# Patient Record
Sex: MALE | Race: WHITE | Employment: UNEMPLOYED | URBAN - METROPOLITAN AREA
[De-identification: names, ages, dates, MRNs, and addresses within clinical notes are randomized per-mention and may not be internally consistent; named-entity substitution may affect disease eponyms.]

---

## 2018-02-01 PROBLEM — I15.9 SECONDARY HYPERTENSION: Status: ACTIVE | Noted: 2018-02-01

## 2018-02-01 PROBLEM — E11.9 TYPE 2 DIABETES MELLITUS WITHOUT COMPLICATION (HCC): Status: ACTIVE | Noted: 2018-02-01

## 2018-04-02 ENCOUNTER — OFFICE VISIT (OUTPATIENT)
Dept: PRIMARY CARE CLINIC | Age: 53
End: 2018-04-02
Payer: COMMERCIAL

## 2018-04-02 VITALS
HEART RATE: 96 BPM | TEMPERATURE: 98.2 F | BODY MASS INDEX: 56.42 KG/M2 | WEIGHT: 315 LBS | SYSTOLIC BLOOD PRESSURE: 139 MMHG | DIASTOLIC BLOOD PRESSURE: 80 MMHG | OXYGEN SATURATION: 98 %

## 2018-04-02 DIAGNOSIS — I15.9 SECONDARY HYPERTENSION: ICD-10-CM

## 2018-04-02 DIAGNOSIS — Z23 NEED FOR TETANUS BOOSTER: ICD-10-CM

## 2018-04-02 DIAGNOSIS — E11.9 TYPE 2 DIABETES MELLITUS WITHOUT COMPLICATION, WITHOUT LONG-TERM CURRENT USE OF INSULIN (HCC): Primary | ICD-10-CM

## 2018-04-02 PROCEDURE — 3046F HEMOGLOBIN A1C LEVEL >9.0%: CPT | Performed by: INTERNAL MEDICINE

## 2018-04-02 PROCEDURE — 1036F TOBACCO NON-USER: CPT | Performed by: INTERNAL MEDICINE

## 2018-04-02 PROCEDURE — 3017F COLORECTAL CA SCREEN DOC REV: CPT | Performed by: INTERNAL MEDICINE

## 2018-04-02 PROCEDURE — G8417 CALC BMI ABV UP PARAM F/U: HCPCS | Performed by: INTERNAL MEDICINE

## 2018-04-02 PROCEDURE — 90471 IMMUNIZATION ADMIN: CPT | Performed by: INTERNAL MEDICINE

## 2018-04-02 PROCEDURE — 90715 TDAP VACCINE 7 YRS/> IM: CPT | Performed by: INTERNAL MEDICINE

## 2018-04-02 PROCEDURE — G8427 DOCREV CUR MEDS BY ELIG CLIN: HCPCS | Performed by: INTERNAL MEDICINE

## 2018-04-02 PROCEDURE — 99214 OFFICE O/P EST MOD 30 MIN: CPT | Performed by: INTERNAL MEDICINE

## 2018-04-02 RX ORDER — PIOGLITAZONEHYDROCHLORIDE 30 MG/1
15 TABLET ORAL DAILY
Qty: 45 TABLET | Refills: 1 | Status: SHIPPED | OUTPATIENT
Start: 2018-04-02 | End: 2018-07-02 | Stop reason: SDUPTHER

## 2018-04-02 RX ORDER — LISINOPRIL 40 MG/1
20 TABLET ORAL 2 TIMES DAILY
Qty: 90 TABLET | Refills: 1 | Status: SHIPPED | OUTPATIENT
Start: 2018-04-02 | End: 2018-07-02 | Stop reason: SDUPTHER

## 2018-04-09 ENCOUNTER — HOSPITAL ENCOUNTER (OUTPATIENT)
Age: 53
Discharge: HOME | End: 2018-04-11
Payer: COMMERCIAL

## 2018-04-09 ENCOUNTER — NURSE ONLY (OUTPATIENT)
Dept: PRIMARY CARE CLINIC | Age: 53
End: 2018-04-09

## 2018-04-09 DIAGNOSIS — I15.9 SECONDARY HYPERTENSION: ICD-10-CM

## 2018-04-09 DIAGNOSIS — E78.2 MIXED HYPERLIPIDEMIA: Primary | ICD-10-CM

## 2018-04-09 DIAGNOSIS — E11.9 TYPE 2 DIABETES MELLITUS WITHOUT COMPLICATION, WITHOUT LONG-TERM CURRENT USE OF INSULIN (HCC): ICD-10-CM

## 2018-04-09 LAB
ANION GAP SERPL CALCULATED.3IONS-SCNC: 18 MMOL/L (ref 7–16)
BUN BLDV-MCNC: 17 MG/DL (ref 6–20)
CALCIUM SERPL-MCNC: 9.1 MG/DL (ref 8.6–10.2)
CHLORIDE BLD-SCNC: 99 MMOL/L (ref 98–107)
CHOLESTEROL, TOTAL: 172 MG/DL (ref 0–199)
CO2: 20 MMOL/L (ref 22–29)
CREAT SERPL-MCNC: 0.9 MG/DL (ref 0.7–1.2)
GFR AFRICAN AMERICAN: >60
GFR NON-AFRICAN AMERICAN: >60 ML/MIN/1.73
GLUCOSE BLD-MCNC: 134 MG/DL (ref 74–109)
HBA1C MFR BLD: 6.3 % (ref 4.8–5.9)
HCT VFR BLD CALC: 42 % (ref 37–54)
HDLC SERPL-MCNC: 31 MG/DL
HEMOGLOBIN: 13.3 G/DL (ref 12.5–16.5)
LDL CHOLESTEROL CALCULATED: 112 MG/DL (ref 0–99)
MCH RBC QN AUTO: 28 PG (ref 26–35)
MCHC RBC AUTO-ENTMCNC: 31.7 % (ref 32–34.5)
MCV RBC AUTO: 88.4 FL (ref 80–99.9)
PDW BLD-RTO: 14.7 FL (ref 11.5–15)
PLATELET # BLD: 253 E9/L (ref 130–450)
PMV BLD AUTO: 10.1 FL (ref 7–12)
POTASSIUM SERPL-SCNC: 4.8 MMOL/L (ref 3.5–5)
RBC # BLD: 4.75 E12/L (ref 3.8–5.8)
SODIUM BLD-SCNC: 137 MMOL/L (ref 132–146)
TRIGL SERPL-MCNC: 145 MG/DL (ref 0–149)
VLDLC SERPL CALC-MCNC: 29 MG/DL
WBC # BLD: 8.4 E9/L (ref 4.5–11.5)

## 2018-04-09 PROCEDURE — 83036 HEMOGLOBIN GLYCOSYLATED A1C: CPT

## 2018-04-09 PROCEDURE — 85027 COMPLETE CBC AUTOMATED: CPT

## 2018-04-09 PROCEDURE — 80048 BASIC METABOLIC PNL TOTAL CA: CPT

## 2018-04-09 PROCEDURE — 80061 LIPID PANEL: CPT

## 2018-05-21 ENCOUNTER — OFFICE VISIT (OUTPATIENT)
Dept: FAMILY MEDICINE CLINIC | Age: 53
End: 2018-05-21
Payer: COMMERCIAL

## 2018-05-21 VITALS
HEART RATE: 92 BPM | OXYGEN SATURATION: 98 % | BODY MASS INDEX: 42.66 KG/M2 | DIASTOLIC BLOOD PRESSURE: 75 MMHG | RESPIRATION RATE: 19 BRPM | TEMPERATURE: 98.5 F | SYSTOLIC BLOOD PRESSURE: 118 MMHG | HEIGHT: 72 IN | WEIGHT: 315 LBS

## 2018-05-21 DIAGNOSIS — B37.2 INTERTRIGINOUS CANDIDIASIS: Primary | ICD-10-CM

## 2018-05-21 PROCEDURE — G8427 DOCREV CUR MEDS BY ELIG CLIN: HCPCS | Performed by: PHYSICIAN ASSISTANT

## 2018-05-21 PROCEDURE — 3017F COLORECTAL CA SCREEN DOC REV: CPT | Performed by: PHYSICIAN ASSISTANT

## 2018-05-21 PROCEDURE — G8417 CALC BMI ABV UP PARAM F/U: HCPCS | Performed by: PHYSICIAN ASSISTANT

## 2018-05-21 PROCEDURE — 1036F TOBACCO NON-USER: CPT | Performed by: PHYSICIAN ASSISTANT

## 2018-05-21 PROCEDURE — 99213 OFFICE O/P EST LOW 20 MIN: CPT | Performed by: PHYSICIAN ASSISTANT

## 2018-05-21 RX ORDER — KETOCONAZOLE 20 MG/G
CREAM TOPICAL
Qty: 30 G | Refills: 1 | Status: SHIPPED | OUTPATIENT
Start: 2018-05-21 | End: 2019-01-17

## 2018-05-21 RX ORDER — FLUCONAZOLE 150 MG/1
150 TABLET ORAL ONCE
Qty: 1 TABLET | Refills: 0 | Status: SHIPPED | OUTPATIENT
Start: 2018-05-21 | End: 2018-05-21

## 2018-07-02 ENCOUNTER — OFFICE VISIT (OUTPATIENT)
Dept: PRIMARY CARE CLINIC | Age: 53
End: 2018-07-02
Payer: COMMERCIAL

## 2018-07-02 VITALS
DIASTOLIC BLOOD PRESSURE: 88 MMHG | WEIGHT: 315 LBS | SYSTOLIC BLOOD PRESSURE: 138 MMHG | OXYGEN SATURATION: 98 % | HEART RATE: 86 BPM | BODY MASS INDEX: 56.42 KG/M2 | TEMPERATURE: 98.2 F

## 2018-07-02 DIAGNOSIS — E11.9 TYPE 2 DIABETES MELLITUS WITHOUT COMPLICATION, WITHOUT LONG-TERM CURRENT USE OF INSULIN (HCC): Primary | ICD-10-CM

## 2018-07-02 DIAGNOSIS — I15.9 SECONDARY HYPERTENSION: ICD-10-CM

## 2018-07-02 LAB — HBA1C MFR BLD: 6.8 %

## 2018-07-02 PROCEDURE — 83036 HEMOGLOBIN GLYCOSYLATED A1C: CPT | Performed by: INTERNAL MEDICINE

## 2018-07-02 PROCEDURE — 99213 OFFICE O/P EST LOW 20 MIN: CPT | Performed by: INTERNAL MEDICINE

## 2018-07-02 PROCEDURE — 1036F TOBACCO NON-USER: CPT | Performed by: INTERNAL MEDICINE

## 2018-07-02 PROCEDURE — G8417 CALC BMI ABV UP PARAM F/U: HCPCS | Performed by: INTERNAL MEDICINE

## 2018-07-02 PROCEDURE — 3044F HG A1C LEVEL LT 7.0%: CPT | Performed by: INTERNAL MEDICINE

## 2018-07-02 PROCEDURE — 3017F COLORECTAL CA SCREEN DOC REV: CPT | Performed by: INTERNAL MEDICINE

## 2018-07-02 PROCEDURE — G8427 DOCREV CUR MEDS BY ELIG CLIN: HCPCS | Performed by: INTERNAL MEDICINE

## 2018-07-02 PROCEDURE — 2022F DILAT RTA XM EVC RTNOPTHY: CPT | Performed by: INTERNAL MEDICINE

## 2018-07-02 RX ORDER — PIOGLITAZONEHYDROCHLORIDE 30 MG/1
15 TABLET ORAL DAILY
Qty: 45 TABLET | Refills: 1 | Status: SHIPPED | OUTPATIENT
Start: 2018-07-02 | End: 2018-11-01 | Stop reason: SDUPTHER

## 2018-07-02 RX ORDER — LISINOPRIL 40 MG/1
20 TABLET ORAL 2 TIMES DAILY
Qty: 90 TABLET | Refills: 1 | Status: SHIPPED | OUTPATIENT
Start: 2018-07-02 | End: 2018-11-01 | Stop reason: SDUPTHER

## 2018-11-01 ENCOUNTER — HOSPITAL ENCOUNTER (OUTPATIENT)
Age: 53
Discharge: HOME | End: 2018-11-03
Payer: COMMERCIAL

## 2018-11-01 ENCOUNTER — OFFICE VISIT (OUTPATIENT)
Dept: PRIMARY CARE CLINIC | Age: 53
End: 2018-11-01
Payer: COMMERCIAL

## 2018-11-01 VITALS
SYSTOLIC BLOOD PRESSURE: 122 MMHG | TEMPERATURE: 98.2 F | BODY MASS INDEX: 55.47 KG/M2 | WEIGHT: 315 LBS | DIASTOLIC BLOOD PRESSURE: 78 MMHG | OXYGEN SATURATION: 98 % | HEART RATE: 72 BPM

## 2018-11-01 DIAGNOSIS — I15.9 SECONDARY HYPERTENSION: ICD-10-CM

## 2018-11-01 DIAGNOSIS — E11.9 TYPE 2 DIABETES MELLITUS WITHOUT COMPLICATION, WITHOUT LONG-TERM CURRENT USE OF INSULIN (HCC): ICD-10-CM

## 2018-11-01 DIAGNOSIS — E66.01 CLASS 3 SEVERE OBESITY WITH SERIOUS COMORBIDITY AND BODY MASS INDEX (BMI) OF 50.0 TO 59.9 IN ADULT, UNSPECIFIED OBESITY TYPE (HCC): ICD-10-CM

## 2018-11-01 DIAGNOSIS — Z23 NEED FOR INFLUENZA VACCINATION: Primary | ICD-10-CM

## 2018-11-01 LAB
ALBUMIN SERPL-MCNC: 4.1 G/DL (ref 3.5–5.2)
ALP BLD-CCNC: 75 U/L (ref 40–129)
ALT SERPL-CCNC: 20 U/L (ref 0–40)
ANION GAP SERPL CALCULATED.3IONS-SCNC: 14 MMOL/L (ref 7–16)
AST SERPL-CCNC: 20 U/L (ref 0–39)
BILIRUB SERPL-MCNC: 0.3 MG/DL (ref 0–1.2)
BUN BLDV-MCNC: 13 MG/DL (ref 6–20)
CALCIUM SERPL-MCNC: 9.3 MG/DL (ref 8.6–10.2)
CHLORIDE BLD-SCNC: 97 MMOL/L (ref 98–107)
CO2: 23 MMOL/L (ref 22–29)
CREAT SERPL-MCNC: 0.9 MG/DL (ref 0.7–1.2)
GFR AFRICAN AMERICAN: >60
GFR NON-AFRICAN AMERICAN: >60 ML/MIN/1.73
GLUCOSE BLD-MCNC: 105 MG/DL (ref 74–109)
HBA1C MFR BLD: 6.4 % (ref 4–5.6)
HCT VFR BLD CALC: 44.1 % (ref 37–54)
HEMOGLOBIN: 13.9 G/DL (ref 12.5–16.5)
MCH RBC QN AUTO: 27.8 PG (ref 26–35)
MCHC RBC AUTO-ENTMCNC: 31.5 % (ref 32–34.5)
MCV RBC AUTO: 88.2 FL (ref 80–99.9)
PDW BLD-RTO: 14.8 FL (ref 11.5–15)
PLATELET # BLD: 277 E9/L (ref 130–450)
PMV BLD AUTO: 9.8 FL (ref 7–12)
POTASSIUM SERPL-SCNC: 4.8 MMOL/L (ref 3.5–5)
RBC # BLD: 5 E12/L (ref 3.8–5.8)
SODIUM BLD-SCNC: 134 MMOL/L (ref 132–146)
TOTAL PROTEIN: 7.3 G/DL (ref 6.4–8.3)
WBC # BLD: 7.9 E9/L (ref 4.5–11.5)

## 2018-11-01 PROCEDURE — 90688 IIV4 VACCINE SPLT 0.5 ML IM: CPT | Performed by: INTERNAL MEDICINE

## 2018-11-01 PROCEDURE — 2022F DILAT RTA XM EVC RTNOPTHY: CPT | Performed by: INTERNAL MEDICINE

## 2018-11-01 PROCEDURE — 3044F HG A1C LEVEL LT 7.0%: CPT | Performed by: INTERNAL MEDICINE

## 2018-11-01 PROCEDURE — 83036 HEMOGLOBIN GLYCOSYLATED A1C: CPT

## 2018-11-01 PROCEDURE — 3017F COLORECTAL CA SCREEN DOC REV: CPT | Performed by: INTERNAL MEDICINE

## 2018-11-01 PROCEDURE — 90471 IMMUNIZATION ADMIN: CPT | Performed by: INTERNAL MEDICINE

## 2018-11-01 PROCEDURE — 99214 OFFICE O/P EST MOD 30 MIN: CPT | Performed by: INTERNAL MEDICINE

## 2018-11-01 PROCEDURE — 85027 COMPLETE CBC AUTOMATED: CPT

## 2018-11-01 PROCEDURE — 1036F TOBACCO NON-USER: CPT | Performed by: INTERNAL MEDICINE

## 2018-11-01 PROCEDURE — 80053 COMPREHEN METABOLIC PANEL: CPT

## 2018-11-01 PROCEDURE — G8427 DOCREV CUR MEDS BY ELIG CLIN: HCPCS | Performed by: INTERNAL MEDICINE

## 2018-11-01 PROCEDURE — G8417 CALC BMI ABV UP PARAM F/U: HCPCS | Performed by: INTERNAL MEDICINE

## 2018-11-01 PROCEDURE — G8482 FLU IMMUNIZE ORDER/ADMIN: HCPCS | Performed by: INTERNAL MEDICINE

## 2018-11-01 RX ORDER — LISINOPRIL 40 MG/1
20 TABLET ORAL 2 TIMES DAILY
Qty: 90 TABLET | Refills: 1 | Status: CANCELLED | OUTPATIENT
Start: 2018-11-01

## 2018-11-01 RX ORDER — PIOGLITAZONEHYDROCHLORIDE 30 MG/1
15 TABLET ORAL DAILY
Qty: 45 TABLET | Refills: 1 | Status: SHIPPED | OUTPATIENT
Start: 2018-11-01 | End: 2019-03-01 | Stop reason: SDUPTHER

## 2018-11-01 RX ORDER — LISINOPRIL 40 MG/1
20 TABLET ORAL 2 TIMES DAILY
Qty: 90 TABLET | Refills: 1 | Status: SHIPPED | OUTPATIENT
Start: 2018-11-01 | End: 2019-03-01 | Stop reason: SDUPTHER

## 2018-11-01 RX ORDER — PIOGLITAZONEHYDROCHLORIDE 30 MG/1
15 TABLET ORAL DAILY
Qty: 45 TABLET | Refills: 1 | Status: CANCELLED | OUTPATIENT
Start: 2018-11-01

## 2018-11-01 NOTE — PROGRESS NOTES
Vaccine Information Sheet, \"Influenza - Inactivated\"  given to Rashawn Mcbride, or parent/legal guardian of  Rashawn Mcbride and verbalized understanding. Patient responses:    Have you ever had a reaction to a flu vaccine? No  Are you able to eat eggs without adverse effects? Yes  Do you have any current illness? No  Have you ever had Guillian [place] Syndrome? No    Flu vaccine given per order. Please see immunization tab.
vomiting. Musculoskeletal: Negative for arthralgias, back pain, gait problem and joint swelling. Neurological: Negative for dizziness, weakness,numbness and headaches. OBJECTIVE:  /78   Pulse 72   Temp 98.2 °F (36.8 °C)   Wt (!) 409 lb (185.5 kg)   SpO2 98%   BMI 55.47 kg/m²      Physical Exam   Constitutional:  obese  HENT: No sinustenderness or lymphadenopathy  Head: Normocephalic and atraumatic. Eyes: Left eye exhibits no discharge. No scleral icterus. Neck: No tracheal deviation present. No thyromegalypresent. Cardiovascular: Normal rate, regular rhythm, normal heart sounds and intact distal pulses. Exam reveals no gallop and no friction rub. No murmur heard. Pulmonary/Chest: Effort normal and breath sounds normal. No respiratory distress. She has no wheezes. no rales. no tenderness. Musculoskeletal:  no tenderness. Neurological:alert and oriented to person, place, and time. Skin: No diaphoresis. Psychiatric: Normal mood and affect. Behavior isnormal.     ASSESSMENT AND PLAN:    Nnia Marrero was seen today for diabetes and congestion. Diagnoses and all orders for this visit:    Need for influenza vaccination  -     INFLUENZA, QUADV, 6 MO AND OLDER, IM, MDV, 0.5ML (FLULAVAL QUADV)    Secondary hypertension  -     lisinopril (PRINIVIL;ZESTRIL) 40 MG tablet; Take 0.5 tablets by mouth 2 times daily    Type 2 diabetes mellitus without complication, without long-term current use of insulin (HCC)  -     pioglitazone (ACTOS) 30 MG tablet; Take 0.5 tablets by mouth daily  -     CBC; Future  -     Comprehensive Metabolic Panel; Future  -     Hemoglobin A1C; Future  -     [facility]- Diabetes Education- Zena    Class 3 severe obesity with serious comorbidity and body mass index (BMI) of 50.0 to 59.9 in adult, unspecified obesity type ([facility] [address]    Other orders  -     Cancel: lisinopril (PRINIVIL;ZESTRIL) 40 MG tablet;  Take 0.5 tablets by mouth 2 times daily  -     Cancel: pioglitazone (ACTOS)

## 2018-11-09 DIAGNOSIS — E11.9 TYPE 2 DIABETES MELLITUS WITHOUT COMPLICATION, WITHOUT LONG-TERM CURRENT USE OF INSULIN (HCC): Primary | ICD-10-CM

## 2018-11-29 ENCOUNTER — HOSPITAL ENCOUNTER (OUTPATIENT)
Dept: DIABETES SERVICES | Age: 53
Setting detail: THERAPIES SERIES
Discharge: HOME | End: 2018-11-29
Payer: COMMERCIAL

## 2018-11-29 VITALS — WEIGHT: 315 LBS | HEIGHT: 72 IN | BODY MASS INDEX: 42.66 KG/M2

## 2018-11-29 PROCEDURE — 97802 MEDICAL NUTRITION INDIV IN: CPT | Performed by: DIETITIAN, REGISTERED

## 2018-11-29 ASSESSMENT — PATIENT HEALTH QUESTIONNAIRE - PHQ9
SUM OF ALL RESPONSES TO PHQ QUESTIONS 1-9: 0
SUM OF ALL RESPONSES TO PHQ QUESTIONS 1-9: 0

## 2018-11-29 NOTE — PROGRESS NOTES
Diabetes Self-Management Education Record    Participant Name: Renae Salgado  Referring Provider: Radha Loera DO  Assessment/Evaluation Ratings:  1=Needs Instruction   4=Demonstrates Understanding/Competency  2=Needs Review   NC=Not Covered    3=Comprehends Key Points  N/A=Not Applicable  Topics/Learning Objectives Pre-session Assess Date:  11/29/18-JA Instr. Date Reinforce Date Post- session Eval Comments   Diabetes disease process & Treatment process: Define diabetes & pre-diabetes; Identify own type of diabetes; role of the pancreas; signs/symptoms; diagnostic criteria; prevention & treatment options; contributing factors. 1 11/29/18-JA  3 Type 2, Pt is not sure when diagnosed, approximately 5 years ago. Provided type 1 and 2 booklet   Incorporating nutritional management into lifestyle: Describe effect of type, amount & timing of food on blood glucose; Describe basic meal planning techniques & current nutrition guidelines;Correctly read food labels & demonstrate CHO counting & portion control with personalized meal plan. Identify dining out strategies, & dietary sick day guidelines. 1 11/29/18-JA  3 11/29/18 Instructed on 2700 calorie meal plan, 3 meals and 3 snacks. 5 carbohydrate choices at each meal and 1 - 2 in between each meal. 15 oz protein, 8 fats daily. Provided sample meals and snacks. Pt states meal are irregular d/t work schedule. Pt eats dinner earlier than he wants to please his mom. Then he eats again later that night. Admits he overeats at night. Discussed foods to eat such as vegetables and low fat protein, instead of carbohydrate choices at night. Pt feels he will be able to change his food choices. Incorporating physical activity into lifestyle:   Verbalize effect of exercise on blood glucose levels; benefits of regular exercise; safety considerations; contraindications; maintenance of activity.  1 11/29/18-JA  3 Discussed effects of exercise on blood sugar   Using medications

## 2019-01-17 ENCOUNTER — OFFICE VISIT (OUTPATIENT)
Dept: PRIMARY CARE CLINIC | Age: 54
End: 2019-01-17
Payer: COMMERCIAL

## 2019-01-17 VITALS
OXYGEN SATURATION: 98 % | HEIGHT: 73 IN | HEART RATE: 100 BPM | TEMPERATURE: 97.8 F | BODY MASS INDEX: 41.75 KG/M2 | WEIGHT: 315 LBS | DIASTOLIC BLOOD PRESSURE: 82 MMHG | SYSTOLIC BLOOD PRESSURE: 122 MMHG

## 2019-01-17 DIAGNOSIS — J40 BRONCHITIS: Primary | ICD-10-CM

## 2019-01-17 PROCEDURE — 1036F TOBACCO NON-USER: CPT | Performed by: INTERNAL MEDICINE

## 2019-01-17 PROCEDURE — 3017F COLORECTAL CA SCREEN DOC REV: CPT | Performed by: INTERNAL MEDICINE

## 2019-01-17 PROCEDURE — G8417 CALC BMI ABV UP PARAM F/U: HCPCS | Performed by: INTERNAL MEDICINE

## 2019-01-17 PROCEDURE — 99213 OFFICE O/P EST LOW 20 MIN: CPT | Performed by: INTERNAL MEDICINE

## 2019-01-17 PROCEDURE — G8482 FLU IMMUNIZE ORDER/ADMIN: HCPCS | Performed by: INTERNAL MEDICINE

## 2019-01-17 PROCEDURE — G8427 DOCREV CUR MEDS BY ELIG CLIN: HCPCS | Performed by: INTERNAL MEDICINE

## 2019-01-17 RX ORDER — PREDNISONE 10 MG/1
10 TABLET ORAL DAILY
Qty: 3 TABLET | Refills: 0 | Status: SHIPPED | OUTPATIENT
Start: 2019-01-17 | End: 2019-01-20

## 2019-01-17 RX ORDER — DOXYCYCLINE HYCLATE 100 MG/1
100 CAPSULE ORAL 2 TIMES DAILY
Qty: 20 CAPSULE | Refills: 0 | Status: SHIPPED | OUTPATIENT
Start: 2019-01-17 | End: 2019-01-27

## 2019-01-17 RX ORDER — PREDNISONE 1 MG/1
5 TABLET ORAL DAILY
Qty: 3 TABLET | Refills: 0 | Status: SHIPPED | OUTPATIENT
Start: 2019-01-17 | End: 2019-01-20

## 2019-01-17 RX ORDER — PREDNISONE 20 MG/1
20 TABLET ORAL DAILY
Qty: 3 TABLET | Refills: 0 | Status: SHIPPED | OUTPATIENT
Start: 2019-01-17 | End: 2019-01-20

## 2019-03-01 ENCOUNTER — OFFICE VISIT (OUTPATIENT)
Dept: PRIMARY CARE CLINIC | Age: 54
End: 2019-03-01
Payer: COMMERCIAL

## 2019-03-01 VITALS
HEIGHT: 73 IN | DIASTOLIC BLOOD PRESSURE: 84 MMHG | SYSTOLIC BLOOD PRESSURE: 136 MMHG | WEIGHT: 315 LBS | BODY MASS INDEX: 41.75 KG/M2 | TEMPERATURE: 98 F | HEART RATE: 72 BPM | OXYGEN SATURATION: 96 %

## 2019-03-01 DIAGNOSIS — N47.5 FORESKIN ADHESIONS: ICD-10-CM

## 2019-03-01 DIAGNOSIS — J32.1 CHRONIC FRONTAL SINUSITIS: Primary | ICD-10-CM

## 2019-03-01 DIAGNOSIS — S39.012A STRAIN OF LUMBAR REGION, INITIAL ENCOUNTER: ICD-10-CM

## 2019-03-01 DIAGNOSIS — I15.9 SECONDARY HYPERTENSION: ICD-10-CM

## 2019-03-01 DIAGNOSIS — E11.9 TYPE 2 DIABETES MELLITUS WITHOUT COMPLICATION, WITHOUT LONG-TERM CURRENT USE OF INSULIN (HCC): ICD-10-CM

## 2019-03-01 PROCEDURE — G8417 CALC BMI ABV UP PARAM F/U: HCPCS | Performed by: INTERNAL MEDICINE

## 2019-03-01 PROCEDURE — 1036F TOBACCO NON-USER: CPT | Performed by: INTERNAL MEDICINE

## 2019-03-01 PROCEDURE — 2022F DILAT RTA XM EVC RTNOPTHY: CPT | Performed by: INTERNAL MEDICINE

## 2019-03-01 PROCEDURE — G8427 DOCREV CUR MEDS BY ELIG CLIN: HCPCS | Performed by: INTERNAL MEDICINE

## 2019-03-01 PROCEDURE — 3017F COLORECTAL CA SCREEN DOC REV: CPT | Performed by: INTERNAL MEDICINE

## 2019-03-01 PROCEDURE — 3046F HEMOGLOBIN A1C LEVEL >9.0%: CPT | Performed by: INTERNAL MEDICINE

## 2019-03-01 PROCEDURE — G8482 FLU IMMUNIZE ORDER/ADMIN: HCPCS | Performed by: INTERNAL MEDICINE

## 2019-03-01 PROCEDURE — 99214 OFFICE O/P EST MOD 30 MIN: CPT | Performed by: INTERNAL MEDICINE

## 2019-03-01 RX ORDER — LISINOPRIL 40 MG/1
20 TABLET ORAL 2 TIMES DAILY
Qty: 90 TABLET | Refills: 1 | Status: SHIPPED | OUTPATIENT
Start: 2019-03-01 | End: 2019-06-26 | Stop reason: SDUPTHER

## 2019-03-01 RX ORDER — PIOGLITAZONEHYDROCHLORIDE 30 MG/1
15 TABLET ORAL DAILY
Qty: 45 TABLET | Refills: 1 | Status: SHIPPED | OUTPATIENT
Start: 2019-03-01 | End: 2019-11-04 | Stop reason: SDUPTHER

## 2019-03-01 ASSESSMENT — PATIENT HEALTH QUESTIONNAIRE - PHQ9
2. FEELING DOWN, DEPRESSED OR HOPELESS: 0
SUM OF ALL RESPONSES TO PHQ QUESTIONS 1-9: 0
SUM OF ALL RESPONSES TO PHQ QUESTIONS 1-9: 0
SUM OF ALL RESPONSES TO PHQ9 QUESTIONS 1 & 2: 0
1. LITTLE INTEREST OR PLEASURE IN DOING THINGS: 0

## 2019-03-01 ASSESSMENT — ENCOUNTER SYMPTOMS
SHORTNESS OF BREATH: 1
RHINORRHEA: 1
SINUS PAIN: 1
WHEEZING: 1
SINUS PRESSURE: 1
BACK PAIN: 1

## 2019-03-13 ENCOUNTER — OFFICE VISIT (OUTPATIENT)
Dept: PRIMARY CARE CLINIC | Age: 54
End: 2019-03-13
Payer: COMMERCIAL

## 2019-03-13 VITALS
SYSTOLIC BLOOD PRESSURE: 118 MMHG | WEIGHT: 315 LBS | DIASTOLIC BLOOD PRESSURE: 78 MMHG | TEMPERATURE: 98.2 F | BODY MASS INDEX: 55.28 KG/M2 | HEART RATE: 86 BPM | OXYGEN SATURATION: 98 %

## 2019-03-13 DIAGNOSIS — S39.012A STRAIN OF LUMBAR REGION, INITIAL ENCOUNTER: Primary | ICD-10-CM

## 2019-03-13 PROCEDURE — 99213 OFFICE O/P EST LOW 20 MIN: CPT | Performed by: INTERNAL MEDICINE

## 2019-03-13 PROCEDURE — 1036F TOBACCO NON-USER: CPT | Performed by: INTERNAL MEDICINE

## 2019-03-13 PROCEDURE — G8417 CALC BMI ABV UP PARAM F/U: HCPCS | Performed by: INTERNAL MEDICINE

## 2019-03-13 PROCEDURE — 3017F COLORECTAL CA SCREEN DOC REV: CPT | Performed by: INTERNAL MEDICINE

## 2019-03-13 PROCEDURE — G8482 FLU IMMUNIZE ORDER/ADMIN: HCPCS | Performed by: INTERNAL MEDICINE

## 2019-03-13 PROCEDURE — G8427 DOCREV CUR MEDS BY ELIG CLIN: HCPCS | Performed by: INTERNAL MEDICINE

## 2019-03-13 RX ORDER — TIZANIDINE 2 MG/1
2 TABLET ORAL EVERY 6 HOURS PRN
Qty: 30 TABLET | Refills: 0 | Status: SHIPPED | OUTPATIENT
Start: 2019-03-13 | End: 2019-12-18 | Stop reason: ALTCHOICE

## 2019-04-16 ENCOUNTER — OFFICE VISIT (OUTPATIENT)
Dept: PRIMARY CARE CLINIC | Age: 54
End: 2019-04-16
Payer: COMMERCIAL

## 2019-04-16 VITALS
TEMPERATURE: 98 F | BODY MASS INDEX: 54.88 KG/M2 | SYSTOLIC BLOOD PRESSURE: 138 MMHG | HEART RATE: 76 BPM | DIASTOLIC BLOOD PRESSURE: 86 MMHG | WEIGHT: 315 LBS | OXYGEN SATURATION: 96 %

## 2019-04-16 DIAGNOSIS — R07.9 CHEST PAIN, UNSPECIFIED TYPE: Primary | ICD-10-CM

## 2019-04-16 DIAGNOSIS — R20.2 TINGLING OF LEFT UPPER EXTREMITY: ICD-10-CM

## 2019-04-16 PROCEDURE — 99213 OFFICE O/P EST LOW 20 MIN: CPT | Performed by: INTERNAL MEDICINE

## 2019-04-16 PROCEDURE — 93000 ELECTROCARDIOGRAM COMPLETE: CPT | Performed by: INTERNAL MEDICINE

## 2019-04-16 PROCEDURE — 1036F TOBACCO NON-USER: CPT | Performed by: INTERNAL MEDICINE

## 2019-04-16 PROCEDURE — 3017F COLORECTAL CA SCREEN DOC REV: CPT | Performed by: INTERNAL MEDICINE

## 2019-04-16 PROCEDURE — G8427 DOCREV CUR MEDS BY ELIG CLIN: HCPCS | Performed by: INTERNAL MEDICINE

## 2019-04-16 PROCEDURE — G8417 CALC BMI ABV UP PARAM F/U: HCPCS | Performed by: INTERNAL MEDICINE

## 2019-04-16 RX ORDER — DOXYCYCLINE HYCLATE 100 MG/1
100 CAPSULE ORAL 2 TIMES DAILY
COMMUNITY
End: 2019-12-27 | Stop reason: ALTCHOICE

## 2019-04-16 RX ORDER — PREDNISONE 1 MG/1
5 TABLET ORAL DAILY
COMMUNITY
End: 2019-04-19

## 2019-04-16 NOTE — PATIENT INSTRUCTIONS
Patient Education        Advance Directives: Care Instructions  Your Care Instructions  An advance directive is a legal way to state your wishes at the end of your life. It tells your family and your doctor what to do if you can no longer say what you want. There are two main types of advance directives. You can change them any time that your wishes change. · A living will tells your family and your doctor your wishes about life support and other treatment. · A durable power of  for health care lets you name a person to make treatment decisions for you when you can't speak for yourself. This person is called a health care agent. If you do not have an advance directive, decisions about your medical care may be made by a doctor or a  who doesn't know you. It may help to think of an advance directive as a gift to the people who care for you. If you have one, they won't have to make tough decisions by themselves. Follow-up care is a key part of your treatment and safety. Be sure to make and go to all appointments, and call your doctor if you are having problems. It's also a good idea to know your test results and keep a list of the medicines you take. How can you care for yourself at home? · Discuss your wishes with your loved ones and your doctor. This way, there are no surprises. · Many states have a unique form. Or you might use a universal form that has been approved by many states. This kind of form can sometimes be completed and stored online. Your electronic copy will then be available wherever you have a connection to the Internet. In most cases, doctors will respect your wishes even if you have a form from a different state. · You don't need a  to do an advance directive. But you may want to get legal advice. · Think about these questions when you prepare an advance directive:  ? Who do you want to make decisions about your medical care if you are not able to?  Many people choose a family member or close friend. ? Do you know enough about life support methods that might be used? If not, talk to your doctor so you understand. ? What are you most afraid of that might happen? You might be afraid of having pain, losing your independence, or being kept alive by machines. ? Where would you prefer to die? Choices include your home, a hospital, or a nursing home. ? Would you like to have information about hospice care to support you and your family? ? Do you want to donate organs when you die? ? Do you want certain Tenri[employer] practices performed before you die? If so, put your wishes in the advance directive. · Read your advance directive every year, and make changes as needed. When should you call for help? Be sure to contact your doctor if you have any questions. Where can you learn more? Go to [email or web address] and sign in to your [employer] account. Enter R264 in the Axial box to learn more about \"Advance Directives: Care Instructions. \"     If you do not have an account, please click on the \"Sign Up Now\" link. Current as of: April 18, 2018  Content Version: 11.9  © 9565-9578 [employer]. Care instructions adapted under license by [facility] ([facility]). If you have questions about a medical condition or this instruction, always ask your healthcare professional. [address] any warranty or liability for your use of this information.

## 2019-04-16 NOTE — PROGRESS NOTES
4/16/19    Renae Thomas, a male of 48 y.o. came to the office     Renae Thomas presents today for same day visit for feeling unwell. His face has been flushing, he felt warm - this is worse at night. He is on prednisone. He has been on the prednisone for 5-6 days. His glucose today was 88. His glucose has been ranging from 130-190. He has been taking doxycycline as well. He is not wheezing and is feeling better from a respiratory standpoint. He has a left shoulder ache - this started at rest.      Patient Active Problem List   Diagnosis    Chest pain    Type 2 diabetes mellitus without complication ([facility] [address]    Class 3 obesity due to excess calories without serious comorbidity with body mass index (BMI) of 50.0 to 59.9 in adult    Secondary hypertension        Allergies   Allergen Reactions    Biaxin [Clarithromycin]      Racing heart      Ciprofloxacin     Levofloxacin Other (See Comments)     pain    Penicillins Hives    Sulfamethoxazole-Trimethoprim        Current Outpatient Medications on File Prior to Visit   Medication Sig Dispense Refill    predniSONE (DELTASONE) 5 MG tablet Take 5 mg by mouth daily      doxycycline hyclate (VIBRAMYCIN) 100 MG capsule Take 100 mg by mouth 2 times daily      tiZANidine (ZANAFLEX) 2 MG tablet Take 1 tablet by mouth every 6 hours as needed (back pain) 30 tablet 0    lisinopril (PRINIVIL;ZESTRIL) 40 MG tablet Take 0.5 tablets by mouth 2 times daily 90 tablet 1    pioglitazone (ACTOS) 30 MG tablet Take 0.5 tablets by mouth daily 45 tablet 1    azelastine (ASTELIN) 0.1 % nasal spray 1 spray by Nasal route 2 times daily Use in each nostril as directed      albuterol sulfate  (90 Base) MCG/ACT inhaler Inhale 2 puffs into the lungs every 6 hours as needed for Wheezing      fluticasone (FLONASE) 50 MCG/ACT nasal spray 2 sprays by Nasal route daily      montelukast (SINGULAIR) 10 MG tablet Take 10 mg by mouth nightly.       albuterol (PROVENTIL) (2.5 MG/3ML) 0.083% nebulizer solution Take 2.5 mg by nebulization every 6 hours as needed. No current facility-administered medications on file prior to visit. Review of Systems  Constitutional: Facial flushing   Respiratory: Negative for choking, chest tightness, shortness of breath and wheezing. Cardiovascular:  left arm ache  Gastrointestinal: Negative for abdominal distention, constipation, diarrhea, nausea and vomiting. Musculoskeletal: Negative for arthralgias, back pain, gait problem and joint swelling. Neurological: Negative for dizziness, weakness,numbness and headaches. OBJECTIVE:  /86   Pulse 76   Temp 98 °F (36.7 °C)   Wt (!) 416 lb (188.7 kg)   SpO2 96%   BMI 54.88 kg/m²      Physical Exam   Constitutional:  oriented to person, place, and time. appears well-developed and well-nourished. No distress. HENT: No sinustenderness or lymphadenopathy  Head: Normocephalic and atraumatic. Eyes: Left eye exhibits no discharge. No scleral icterus. Neck: No tracheal deviation present. No thyromegalypresent. Cardiovascular: Normal rate, regular rhythm, normal heart sounds and intact distal pulses. Exam reveals no gallop and no friction rub. No murmur heard. Pulmonary/Chest: Effort normal and breath sounds normal. No respiratory distress. She has no wheezes. no rales. no tenderness. Musculoskeletal:  no tenderness. Neurological:alert and oriented to person, place, and time. Skin: No diaphoresis. Psychiatric: Normal mood and affect. Behavior isnormal.     ASSESSMENT AND PLAN:    Diagnoses and all orders for this visit:    Chest pain, unspecified type  -     EKG 12 Lead; Standing  -     EKG 12 Lead  -     EKG 12 Lead    Tingling of left upper extremity        Braxton Bangura presents today for evaluation of a she'll flushing and left arm ache. This started yesterday. Of note, he is currently being treated for bronchitis with doxycycline and prednisone.  He last took 50 mg of prednisone yesterday. He denies any chest pains pressure or shortness of breath. Today I performed an EKG for concerns of cardiac etiology given his history of diabetes and his left arm ache with flushing. It was unremarkable. I did advise him if his symptoms return or fail to improve he should go to the ER immediately for evaluation as this still may represent an acute coronary syndrome. In the interim I advised him to discontinue his prednisone and feels may be a side effect of his prednisone therapy. He is to call me back if he has any resumption of the symptoms    No follow-ups on file.     Araseli Cervantes DO

## 2019-04-19 ENCOUNTER — HOSPITAL ENCOUNTER (EMERGENCY)
Age: 54
Discharge: HOME | End: 2019-04-19
Attending: EMERGENCY MEDICINE
Payer: COMMERCIAL

## 2019-04-19 ENCOUNTER — APPOINTMENT (OUTPATIENT)
Dept: CT IMAGING | Age: 54
End: 2019-04-19
Payer: COMMERCIAL

## 2019-04-19 VITALS
HEIGHT: 73 IN | TEMPERATURE: 98.3 F | OXYGEN SATURATION: 96 % | WEIGHT: 315 LBS | HEART RATE: 85 BPM | SYSTOLIC BLOOD PRESSURE: 178 MMHG | DIASTOLIC BLOOD PRESSURE: 78 MMHG | RESPIRATION RATE: 18 BRPM | BODY MASS INDEX: 41.75 KG/M2

## 2019-04-19 DIAGNOSIS — R42 DIZZINESS: Primary | ICD-10-CM

## 2019-04-19 DIAGNOSIS — R23.2 FACIAL FLUSHING: ICD-10-CM

## 2019-04-19 DIAGNOSIS — R11.0 NAUSEA: ICD-10-CM

## 2019-04-19 LAB
ANION GAP SERPL CALCULATED.3IONS-SCNC: 12 MMOL/L (ref 7–16)
BASOPHILS ABSOLUTE: 0.05 E9/L (ref 0–0.2)
BASOPHILS RELATIVE PERCENT: 0.6 % (ref 0–2)
BUN BLDV-MCNC: 16 MG/DL (ref 6–20)
CALCIUM SERPL-MCNC: 8.9 MG/DL (ref 8.6–10.2)
CHLORIDE BLD-SCNC: 102 MMOL/L (ref 98–107)
CO2: 24 MMOL/L (ref 22–29)
CREAT SERPL-MCNC: 0.8 MG/DL (ref 0.7–1.2)
EOSINOPHILS ABSOLUTE: 0.36 E9/L (ref 0.05–0.5)
EOSINOPHILS RELATIVE PERCENT: 4.1 % (ref 0–6)
GFR AFRICAN AMERICAN: >60
GFR NON-AFRICAN AMERICAN: >60 ML/MIN/1.73
GLUCOSE BLD-MCNC: 114 MG/DL (ref 74–99)
HCT VFR BLD CALC: 41.2 % (ref 37–54)
HEMOGLOBIN: 13.3 G/DL (ref 12.5–16.5)
IMMATURE GRANULOCYTES #: 0.1 E9/L
IMMATURE GRANULOCYTES %: 1.2 % (ref 0–5)
LYMPHOCYTES ABSOLUTE: 2.1 E9/L (ref 1.5–4)
LYMPHOCYTES RELATIVE PERCENT: 24.2 % (ref 20–42)
MCH RBC QN AUTO: 28.5 PG (ref 26–35)
MCHC RBC AUTO-ENTMCNC: 32.3 % (ref 32–34.5)
MCV RBC AUTO: 88.2 FL (ref 80–99.9)
MONOCYTES ABSOLUTE: 0.57 E9/L (ref 0.1–0.95)
MONOCYTES RELATIVE PERCENT: 6.6 % (ref 2–12)
NEUTROPHILS ABSOLUTE: 5.51 E9/L (ref 1.8–7.3)
NEUTROPHILS RELATIVE PERCENT: 63.3 % (ref 43–80)
PDW BLD-RTO: 15.4 FL (ref 11.5–15)
PLATELET # BLD: 235 E9/L (ref 130–450)
PMV BLD AUTO: 9.6 FL (ref 7–12)
POTASSIUM REFLEX MAGNESIUM: 4.9 MMOL/L (ref 3.5–5)
RBC # BLD: 4.67 E12/L (ref 3.8–5.8)
REASON FOR REJECTION: NORMAL
REJECTED TEST: NORMAL
SODIUM BLD-SCNC: 138 MMOL/L (ref 132–146)
TSH SERPL DL<=0.05 MIU/L-ACNC: 1.98 UIU/ML (ref 0.27–4.2)
WBC # BLD: 8.7 E9/L (ref 4.5–11.5)

## 2019-04-19 PROCEDURE — 80048 BASIC METABOLIC PNL TOTAL CA: CPT

## 2019-04-19 PROCEDURE — 70450 CT HEAD/BRAIN W/O DYE: CPT

## 2019-04-19 PROCEDURE — 84443 ASSAY THYROID STIM HORMONE: CPT

## 2019-04-19 PROCEDURE — 36415 COLL VENOUS BLD VENIPUNCTURE: CPT

## 2019-04-19 PROCEDURE — 99284 EMERGENCY DEPT VISIT MOD MDM: CPT

## 2019-04-19 PROCEDURE — 6370000000 HC RX 637 (ALT 250 FOR IP): Performed by: EMERGENCY MEDICINE

## 2019-04-19 PROCEDURE — 85025 COMPLETE CBC W/AUTO DIFF WBC: CPT

## 2019-04-19 PROCEDURE — 93005 ELECTROCARDIOGRAM TRACING: CPT | Performed by: EMERGENCY MEDICINE

## 2019-04-19 RX ORDER — MECLIZINE HYDROCHLORIDE 25 MG/1
25 TABLET ORAL 3 TIMES DAILY PRN
Qty: 30 TABLET | Refills: 0 | Status: SHIPPED | OUTPATIENT
Start: 2019-04-19 | End: 2019-04-29

## 2019-04-19 RX ORDER — ONDANSETRON 4 MG/1
4 TABLET, ORALLY DISINTEGRATING ORAL EVERY 8 HOURS PRN
Qty: 24 TABLET | Refills: 0 | Status: SHIPPED | OUTPATIENT
Start: 2019-04-19 | End: 2019-12-18 | Stop reason: ALTCHOICE

## 2019-04-19 RX ORDER — MECLIZINE HCL 12.5 MG/1
25 TABLET ORAL ONCE
Status: COMPLETED | OUTPATIENT
Start: 2019-04-19 | End: 2019-04-19

## 2019-04-19 RX ADMIN — MECLIZINE 25 MG: 12.5 TABLET ORAL at 12:30

## 2019-04-19 ASSESSMENT — PAIN SCALES - GENERAL: PAINLEVEL_OUTOF10: 6

## 2019-04-19 ASSESSMENT — VISUAL ACUITY
OS: 20/20
OU: 20/20
OD: 20/25

## 2019-04-19 ASSESSMENT — PAIN DESCRIPTION - PAIN TYPE: TYPE: ACUTE PAIN

## 2019-04-19 ASSESSMENT — PAIN DESCRIPTION - DESCRIPTORS: DESCRIPTORS: ACHING

## 2019-04-19 ASSESSMENT — PAIN DESCRIPTION - LOCATION: LOCATION: HEAD

## 2019-04-19 ASSESSMENT — PAIN - FUNCTIONAL ASSESSMENT: PAIN_FUNCTIONAL_ASSESSMENT: ACTIVITIES ARE NOT PREVENTED

## 2019-04-19 NOTE — ED PROVIDER NOTES
HPI:  4/19/19, Time: 10:40 AM         Mahin Valencia is a 48 y.o. male presenting to the ED for dizzyness, flushing, beginning 2 days ago. The complaint has been intermittent, moderate in severity, and worsened by nothing. Agents 59-year-old male with history of hypertension on lisinopril. He recently had a cough and cold that was treated with doxycycline. He said he had cough sinus congestion and subjective chills at that time he stated mostly symptoms are last week he has been on doxycycline by his primary care doctor in his symptoms since resolved. He states he still on the doxycycline. He states for the past 2-3 days he's been complaining of intermittent dizziness lightheadedness facial flushing and intermittent blurry but vision. He states these symptoms are intermittent and resolve on their own generally he knows them after he takes his doxycycline. He denies any headache any neck pain any chest pain shortness of breath and abdominal pain nausea vomiting or diarrhea. Review of Systems:   Pertinent positives and negatives are stated within HPI, all other systems reviewed and are negative.          --------------------------------------------- PAST HISTORY ---------------------------------------------  Past Medical History:  has a past medical history of Asthma and Hypertension. Past Surgical History:  has a past surgical history that includes Tonsillectomy; Appendectomy; Foot surgery; and cyst removal.    Social History:  reports that he has quit smoking. He has never used smokeless tobacco. He reports that he does not drink alcohol or use drugs. Family History: family history includes Cancer in his mother; Diabetes in his mother. The patients home medications have been reviewed. Allergies: Biaxin [clarithromycin]; Ciprofloxacin;  Levofloxacin; Penicillins; and Sulfamethoxazole-trimethoprim    -------------------------------------------------- RESULTS 1238  Rate: 74  Rhythm: Sinus  Interpretation: no acute changes  Comparison: no previous EKG available      ED COURSE:       Medical Decision Making:    Patient presented with dizziness facial flushing. No one nonspecific symptoms. All testing was unremarkable. Recommend patient follow up with primary care physician 2-3 days. Patient will be given a prescription for meclizine. Neurological exam is stone cold normal.  Diagnosis to consider is benign positional vertigo. Counseling: The emergency provider has spoken with the patient and discussed todays results, in addition to providing specific details for the plan of care and counseling regarding the diagnosis and prognosis. Questions are answered at this time and they are agreeable with the plan.      --------------------------------- IMPRESSION AND DISPOSITION ---------------------------------    IMPRESSION  1. Dizziness    2. Nausea    3. Facial flushing        DISPOSITION  Disposition: Discharge to home  Patient condition is good      NOTE: This report was transcribed using voice recognition software.  Every effort was made to ensure accuracy; however, inadvertent computerized transcription errors may be present       Manus DO Isa  04/19/19 [id number]

## 2019-04-20 LAB
EKG ATRIAL RATE: 74 BPM
EKG P AXIS: 60 DEGREES
EKG P-R INTERVAL: 194 MS
EKG Q-T INTERVAL: 380 MS
EKG QRS DURATION: 88 MS
EKG QTC CALCULATION (BAZETT): 421 MS
EKG R AXIS: 62 DEGREES
EKG T AXIS: 43 DEGREES
EKG VENTRICULAR RATE: 74 BPM

## 2019-04-22 ENCOUNTER — OFFICE VISIT (OUTPATIENT)
Dept: PRIMARY CARE CLINIC | Age: 54
End: 2019-04-22
Payer: COMMERCIAL

## 2019-04-22 VITALS
TEMPERATURE: 98.2 F | OXYGEN SATURATION: 99 % | WEIGHT: 315 LBS | SYSTOLIC BLOOD PRESSURE: 118 MMHG | HEART RATE: 97 BPM | DIASTOLIC BLOOD PRESSURE: 68 MMHG | BODY MASS INDEX: 54.62 KG/M2

## 2019-04-22 DIAGNOSIS — R42 VERTIGO: ICD-10-CM

## 2019-04-22 DIAGNOSIS — E66.01 CLASS 3 SEVERE OBESITY WITH SERIOUS COMORBIDITY AND BODY MASS INDEX (BMI) OF 50.0 TO 59.9 IN ADULT, UNSPECIFIED OBESITY TYPE (HCC): ICD-10-CM

## 2019-04-22 DIAGNOSIS — B37.9 CANDIDIASIS: Primary | ICD-10-CM

## 2019-04-22 DIAGNOSIS — J32.1 CHRONIC FRONTAL SINUSITIS: ICD-10-CM

## 2019-04-22 DIAGNOSIS — E11.9 TYPE 2 DIABETES MELLITUS WITHOUT COMPLICATION, WITHOUT LONG-TERM CURRENT USE OF INSULIN (HCC): ICD-10-CM

## 2019-04-22 PROCEDURE — 2022F DILAT RTA XM EVC RTNOPTHY: CPT | Performed by: INTERNAL MEDICINE

## 2019-04-22 PROCEDURE — G8427 DOCREV CUR MEDS BY ELIG CLIN: HCPCS | Performed by: INTERNAL MEDICINE

## 2019-04-22 PROCEDURE — 3046F HEMOGLOBIN A1C LEVEL >9.0%: CPT | Performed by: INTERNAL MEDICINE

## 2019-04-22 PROCEDURE — 99214 OFFICE O/P EST MOD 30 MIN: CPT | Performed by: INTERNAL MEDICINE

## 2019-04-22 PROCEDURE — G8417 CALC BMI ABV UP PARAM F/U: HCPCS | Performed by: INTERNAL MEDICINE

## 2019-04-22 PROCEDURE — 1036F TOBACCO NON-USER: CPT | Performed by: INTERNAL MEDICINE

## 2019-04-22 PROCEDURE — 3017F COLORECTAL CA SCREEN DOC REV: CPT | Performed by: INTERNAL MEDICINE

## 2019-04-22 RX ORDER — NYSTATIN 100000 [USP'U]/G
POWDER TOPICAL
Qty: 1 BOTTLE | Refills: 0 | Status: SHIPPED | OUTPATIENT
Start: 2019-04-22 | End: 2019-04-22

## 2019-04-22 RX ORDER — NYSTATIN 100000 [USP'U]/G
POWDER TOPICAL
Qty: 1 BOTTLE | Refills: 0 | Status: SHIPPED | OUTPATIENT
Start: 2019-04-22 | End: 2019-05-21 | Stop reason: ALTCHOICE

## 2019-04-22 NOTE — PROGRESS NOTES
(PROVENTIL) (2.5 MG/3ML) 0.083% nebulizer solution Take 2.5 mg by nebulization every 6 hours as needed.  doxycycline hyclate (VIBRAMYCIN) 100 MG capsule Take 100 mg by mouth 2 times daily       No current facility-administered medications on file prior to visit. Review of Systems  Constitutional:Negative for activity change, appetite change, chills, fatigue and fever. Respiratory: Negative for choking, chest tightness, shortness of breath and wheezing. Cardiovascular: Negative for chest pain, palpitations and leg swelling. Gastrointestinal: Negative for abdominal distention, constipation, diarrhea, nausea and vomiting. Musculoskeletal: Negative for arthralgias, back pain, gait problem and joint swelling. Neurological: Dizziness with double vision. OBJECTIVE:  /68   Pulse 97   Temp 98.2 °F (36.8 °C)   Wt (!) 414 lb (187.8 kg)   SpO2 99%   BMI 54.62 kg/m²      Physical Exam   Constitutional:  oriented to person, place, and time. appears well-developed and well-nourished. No distress. HENT: No sinustenderness or lymphadenopathy  Head: Normocephalic and atraumatic. Eyes: Left eye exhibits no discharge. No scleral icterus. Neck: No tracheal deviation present. No thyromegalypresent. Cardiovascular: Normal rate, regular rhythm, normal heart sounds and intact distal pulses. Exam reveals no gallop and no friction rub. No murmur heard. Pulmonary/Chest: Effort normal and breath sounds normal. No respiratory distress. She has no wheezes. no rales. no tenderness. Musculoskeletal:  no tenderness. Neurological:alert and oriented to person, place, and time. Skin: No diaphoresis. Psychiatric: Normal mood and affect. Behavior isnormal.     ASSESSMENT AND PLAN:    Jeris Cranker was seen today for follow-up from hospital.    Diagnoses and all orders for this visit:    Candidiasis  -     Discontinue: nystatin (MYCOSTATIN) 377190 UNIT/GM powder; Apply 3 times daily.   -     nystatin (MYCOSTATIN) 483654 UNIT/GM powder; Apply 3 times daily. Vertigo    Type 2 diabetes mellitus without complication, without long-term current use of insulin (HCC)    Chronic frontal sinusitis    Class 3 severe obesity with serious comorbidity and body mass index (BMI) of 50.0 to 59.9 in adult, unspecified obesity type ([facility]        Shaheed Tesfaye presents today for ER follow-up. He states that his dizziness and vision changes are improving. We discussed the possibility of further neuro imaging with MRI. He deferred that for now. If his symptoms persist or fail to improve he may benefit from an MRI. He will likely need an open MRI due to his body habitus. He also discussed his chronic frontal sinusitis. It is improving now. Does have a standing appointment with otolaryngology in the next month. We discussed his vertigo which is also improving with meclizine therapy. Today I'll also give him nystatin powder for his axillary candidiasis. Please note that the above documentation was prepared using voice recognition software. Every attempt was made to ensure accuracy but there may be spelling, grammatical, and contextual errors. No follow-ups on file.     Kamran Lopes, DO

## 2019-05-21 ENCOUNTER — TELEPHONE (OUTPATIENT)
Dept: ENT CLINIC | Age: 54
End: 2019-05-21

## 2019-05-21 ENCOUNTER — OFFICE VISIT (OUTPATIENT)
Dept: ENT CLINIC | Age: 54
End: 2019-05-21
Payer: COMMERCIAL

## 2019-05-21 VITALS
SYSTOLIC BLOOD PRESSURE: 132 MMHG | WEIGHT: 315 LBS | HEART RATE: 80 BPM | DIASTOLIC BLOOD PRESSURE: 78 MMHG | BODY MASS INDEX: 41.75 KG/M2 | HEIGHT: 73 IN

## 2019-05-21 DIAGNOSIS — J34.2 DNS (DEVIATED NASAL SEPTUM): ICD-10-CM

## 2019-05-21 DIAGNOSIS — J32.9 RECURRENT SINUS INFECTIONS: ICD-10-CM

## 2019-05-21 DIAGNOSIS — R09.81 NASAL CONGESTION: Primary | ICD-10-CM

## 2019-05-21 PROCEDURE — 1036F TOBACCO NON-USER: CPT | Performed by: OTOLARYNGOLOGY

## 2019-05-21 PROCEDURE — 99204 OFFICE O/P NEW MOD 45 MIN: CPT | Performed by: OTOLARYNGOLOGY

## 2019-05-21 PROCEDURE — G8427 DOCREV CUR MEDS BY ELIG CLIN: HCPCS | Performed by: OTOLARYNGOLOGY

## 2019-05-21 PROCEDURE — G8417 CALC BMI ABV UP PARAM F/U: HCPCS | Performed by: OTOLARYNGOLOGY

## 2019-05-21 PROCEDURE — 3017F COLORECTAL CA SCREEN DOC REV: CPT | Performed by: OTOLARYNGOLOGY

## 2019-05-21 ASSESSMENT — ENCOUNTER SYMPTOMS
SHORTNESS OF BREATH: 0
STRIDOR: 0
EYES NEGATIVE: 1
GASTROINTESTINAL NEGATIVE: 1
SINUS PAIN: 1
PHOTOPHOBIA: 0
RESPIRATORY NEGATIVE: 1
VOMITING: 0
SINUS PRESSURE: 1
RHINORRHEA: 0
NAUSEA: 0

## 2019-05-21 NOTE — TELEPHONE ENCOUNTER
Called scheduling spoke with [employer].   Patient scheduled for CT scan on May 31st at 10:30  Arrival time is 10:00    Called and patient was notified

## 2019-05-21 NOTE — PROGRESS NOTES
Subjective:      Patient ID:  Marvin Osman is a 48 y.o. male. HPI:    Nasal issues  Patient presents with nasal issues for 7 months. his symptoms include nasal congestion, purulent rhinorrhea, headaches, facial pain, ear fullness. These symptoms are seasonal changes. Current triggers include exposure to pollens, dust and mold. Priorantibiotic therapy has included doxycycline. Other medications have included Flonase, Singulair, astelin for several year(s) with good relief of symptoms until recently. Questionable history of nasal polyps  The patient has no history of asthma. The patient has no history of eczema. .    The patient has not had sinus surgery in the past.     The patient takes antibiotics for sinusitis 6 times per  the past 7 months. Allergy testing: yes  Immunotherapy has never been tried  Headaches/Facial Pain: yes   Where: bilateral-  frontal and maxillary    Nasal obstruction: yes   Side: bilateral    Thepatients worst time of year is transitions between season    Patient's medications, allergies, past medical, surgical, social and familyhistories were reviewed and updated as appropriate. Review of Systems   Constitutional: Negative. Negative for chills and fever. HENT: Positive for congestion, sinus pressure and sinus pain. Negative for rhinorrhea. Eyes: Negative. Negative for photophobia and visual disturbance. Respiratory: Negative. Negative for shortness of breath and stridor. Cardiovascular: Negative. Negative for chest pain and palpitations. Gastrointestinal: Negative. Negative for nausea and vomiting. Genitourinary: Negative. Musculoskeletal: Negative. Skin: Negative. Negative for rash and wound. Neurological: Negative. Negative for dizziness and facial asymmetry. Psychiatric/Behavioral: Negative. Negative for agitation and hallucinations. Objective:   Physical Exam   Constitutional: He is oriented to person, place, and time.  No plan.      Electronically signed by Tiffany Maier DO on 5/28/19 at 2:15 PM

## 2019-05-31 ENCOUNTER — HOSPITAL ENCOUNTER (OUTPATIENT)
Dept: CT IMAGING | Age: 54
Discharge: HOME | End: 2019-06-02
Payer: COMMERCIAL

## 2019-05-31 DIAGNOSIS — R09.81 NASAL CONGESTION: ICD-10-CM

## 2019-05-31 DIAGNOSIS — J32.9 RECURRENT SINUS INFECTIONS: ICD-10-CM

## 2019-05-31 PROCEDURE — 70486 CT MAXILLOFACIAL W/O DYE: CPT

## 2019-06-11 ENCOUNTER — OFFICE VISIT (OUTPATIENT)
Dept: ENT CLINIC | Age: 54
End: 2019-06-11
Payer: COMMERCIAL

## 2019-06-11 VITALS
DIASTOLIC BLOOD PRESSURE: 77 MMHG | HEIGHT: 73 IN | BODY MASS INDEX: 41.75 KG/M2 | OXYGEN SATURATION: 94 % | HEART RATE: 91 BPM | WEIGHT: 315 LBS | SYSTOLIC BLOOD PRESSURE: 111 MMHG

## 2019-06-11 DIAGNOSIS — J34.2 DNS (DEVIATED NASAL SEPTUM): ICD-10-CM

## 2019-06-11 DIAGNOSIS — J32.9 RECURRENT SINUS INFECTIONS: Primary | ICD-10-CM

## 2019-06-11 DIAGNOSIS — R09.81 NASAL CONGESTION: ICD-10-CM

## 2019-06-11 PROCEDURE — 99213 OFFICE O/P EST LOW 20 MIN: CPT | Performed by: OTOLARYNGOLOGY

## 2019-06-11 PROCEDURE — G8417 CALC BMI ABV UP PARAM F/U: HCPCS | Performed by: OTOLARYNGOLOGY

## 2019-06-11 PROCEDURE — 1036F TOBACCO NON-USER: CPT | Performed by: OTOLARYNGOLOGY

## 2019-06-11 PROCEDURE — G8427 DOCREV CUR MEDS BY ELIG CLIN: HCPCS | Performed by: OTOLARYNGOLOGY

## 2019-06-11 PROCEDURE — 3017F COLORECTAL CA SCREEN DOC REV: CPT | Performed by: OTOLARYNGOLOGY

## 2019-06-11 ASSESSMENT — ENCOUNTER SYMPTOMS
SINUS PRESSURE: 1
EYES NEGATIVE: 1
RHINORRHEA: 0
PHOTOPHOBIA: 0
SINUS PAIN: 1
STRIDOR: 0
VOMITING: 0
NAUSEA: 0
RESPIRATORY NEGATIVE: 1
SHORTNESS OF BREATH: 0
GASTROINTESTINAL NEGATIVE: 1

## 2019-06-11 NOTE — PROGRESS NOTES
Subjective:      Patient ID:  Shaheed Tesfaye is a 48 y.o. male. HPI:    Pt returns for review of CT Sinus. There are not changes since last visit. Pt has been on fluticasone (Flonase) for 2 week(s) and is doing marginally        Patient's medications, allergies, past medical, surgical, social and family histories were reviewed and updated as appropriate. Review of Systems   Constitutional: Negative. Negative for chills and fever. HENT: Positive for congestion, sinus pressure and sinus pain. Negative for rhinorrhea. Eyes: Negative. Negative for photophobia and visual disturbance. Respiratory: Negative. Negative for shortness of breath and stridor. Cardiovascular: Negative. Negative for chest pain and palpitations. Gastrointestinal: Negative. Negative for nausea and vomiting. Genitourinary: Negative. Musculoskeletal: Negative. Skin: Negative. Negative for rash and wound. Neurological: Negative. Negative for dizziness and facial asymmetry. Psychiatric/Behavioral: Negative. Negative for agitation and hallucinations. Objective:   Physical Exam   Constitutional: He is oriented to person, place, and time. No distress. HENT:   Head: Normocephalic and atraumatic. Right Ear: Tympanic membrane and external ear normal.   Left Ear: Tympanic membrane and external ear normal.   Nose: Mucosal edema and septal deviation present. Mouth/Throat: Oropharynx is clear and moist. No oropharyngeal exudate. Eyes: Pupils are equal, round, and reactive to light. Conjunctivae and EOM are normal.   Neck: Normal range of motion. Neck supple. Cardiovascular: Normal rate. Pulmonary/Chest: Effort normal. No respiratory distress. Abdominal: He exhibits no distension. Musculoskeletal: Normal range of motion. Lymphadenopathy:     He has no cervical adenopathy. Neurological: He is alert and oriented to person, place, and time. Skin: Skin is warm and dry. He is not diaphoretic. CT sinuses (my review)    no - tessy cells -   yes - anthony bullosa -right moderate  yes - Enlarged inferior turbinates - bilateral   yes - Obstructed OMC -bilateral   yes - maxillary sinus disease -bilateral, moderate  yes - frontal sinus disease- bilateral, mild  Left frontal sinus   Absent -  no   Size - Moderate  Right frontal sinus   Absent -  no   Size - Small  yes - sphenoid sinus disease - bilateral,mild  yes - Deviated nasal septum - left,  moderate   yes - Septal Spur - left,moderate   no - posterior accessory os -,                       Assessment:       Diagnosis Orders   1. Recurrent sinus infections     2. Nasal congestion     3. DNS (deviated nasal septum)                Plan:      I recommend:    Functional endoscopic sinus surgery with bilateral maxillary, frontal and sphenoid antrostomies, submucous resection of inferior turbinates, septoplasty    The procedure risks and benefits were discussed with the patient and family. Pt and family understood and decided to proceed with the surgery. Surgical risks include:    --Injury to the lining of the brain, meningitis, stroke and death - most common in the approach to the frontal sinus but may also occur in operations on the sphenoid and ethmoid sinuses  --Injury to the carotid artery - most common in sphenoid sinus surgery  --Injury to the orbit, eye and eye muscles. Most commonly the medial rectus muscle is injured, this can cause double vision. The optic nerve can be injured during sphenoid sinus surgery. Injury to the Nasolacrimal Duct can cause tearing. -- Bleeding or air in the orbit. If this happens, blindness can occur due to pressure. Relief of the pressure is mandatory to prevent loss of sight. Septoplasty  (nasal septum and turbinate surgery)       Surgical risks include:    -- Recurrence of the septal deviation with recurrence of the airway obstruction. The nasal septum is made of cartilage.  Cartilage has a memory and sometimes over the course of hours will bend back to the preoperative position. -- Septal Hematoma: This can occur if a drainage hole was not created in the septum. (Note: most surgeons have the problem of too many holes created during surgery, not too few). The cartilage has no blood supply and receives its nutrients from the overlying mucosa. A septal hematoma elevates the mucosal flaps off of the cartilage, resulting in cartilage death and usually infection. If the entire septum is lost the nose may collapse creating a saddle deformity. -- Hole in the nasal septum. This occurs where there are two opposing holes in the nasal septal flaps. A hole in the nasal septum may result in disturbing crusting, bleeding and whistling.   -- Saddle Nose: If too much supporting cartilage is removed, the mid-portion of the nose may sag creating a \"Saddle Nose\" deformity.          Follow up 1 week after surgery

## 2019-06-26 DIAGNOSIS — I15.9 SECONDARY HYPERTENSION: ICD-10-CM

## 2019-06-27 RX ORDER — LISINOPRIL 40 MG/1
20 TABLET ORAL 2 TIMES DAILY
Qty: 90 TABLET | Refills: 1 | Status: SHIPPED | OUTPATIENT
Start: 2019-06-27 | End: 2019-11-04 | Stop reason: SDUPTHER

## 2019-07-03 ENCOUNTER — OFFICE VISIT (OUTPATIENT)
Dept: PRIMARY CARE CLINIC | Age: 54
End: 2019-07-03
Payer: COMMERCIAL

## 2019-07-03 VITALS
SYSTOLIC BLOOD PRESSURE: 118 MMHG | BODY MASS INDEX: 54.49 KG/M2 | TEMPERATURE: 97.5 F | WEIGHT: 315 LBS | HEART RATE: 97 BPM | OXYGEN SATURATION: 96 % | DIASTOLIC BLOOD PRESSURE: 74 MMHG

## 2019-07-03 DIAGNOSIS — J30.9 ALLERGIC RHINITIS, UNSPECIFIED SEASONALITY, UNSPECIFIED TRIGGER: Primary | ICD-10-CM

## 2019-07-03 PROCEDURE — 96372 THER/PROPH/DIAG INJ SC/IM: CPT | Performed by: INTERNAL MEDICINE

## 2019-07-03 PROCEDURE — G8427 DOCREV CUR MEDS BY ELIG CLIN: HCPCS | Performed by: INTERNAL MEDICINE

## 2019-07-03 PROCEDURE — G8417 CALC BMI ABV UP PARAM F/U: HCPCS | Performed by: INTERNAL MEDICINE

## 2019-07-03 PROCEDURE — 1036F TOBACCO NON-USER: CPT | Performed by: INTERNAL MEDICINE

## 2019-07-03 PROCEDURE — 3017F COLORECTAL CA SCREEN DOC REV: CPT | Performed by: INTERNAL MEDICINE

## 2019-07-03 PROCEDURE — 99213 OFFICE O/P EST LOW 20 MIN: CPT | Performed by: INTERNAL MEDICINE

## 2019-07-03 RX ORDER — METHYLPREDNISOLONE ACETATE 80 MG/ML
80 INJECTION, SUSPENSION INTRA-ARTICULAR; INTRALESIONAL; INTRAMUSCULAR; SOFT TISSUE ONCE
Status: DISCONTINUED | OUTPATIENT
Start: 2019-07-03 | End: 2021-11-19

## 2019-07-03 RX ORDER — METHYLPREDNISOLONE ACETATE 80 MG/ML
80 INJECTION, SUSPENSION INTRA-ARTICULAR; INTRALESIONAL; INTRAMUSCULAR; SOFT TISSUE ONCE
Status: COMPLETED | OUTPATIENT
Start: 2019-07-03 | End: 2019-07-03

## 2019-07-03 RX ORDER — METHYLPREDNISOLONE SODIUM SUCCINATE 40 MG/ML
40 INJECTION, POWDER, LYOPHILIZED, FOR SOLUTION INTRAMUSCULAR; INTRAVENOUS ONCE
Status: DISCONTINUED | OUTPATIENT
Start: 2019-07-03 | End: 2019-07-03

## 2019-07-03 RX ADMIN — METHYLPREDNISOLONE ACETATE 80 MG: 80 INJECTION, SUSPENSION INTRA-ARTICULAR; INTRALESIONAL; INTRAMUSCULAR; SOFT TISSUE at 17:05

## 2019-11-04 ENCOUNTER — HOSPITAL ENCOUNTER (OUTPATIENT)
Age: 54
Discharge: HOME | End: 2019-11-06
Payer: COMMERCIAL

## 2019-11-04 ENCOUNTER — OFFICE VISIT (OUTPATIENT)
Dept: PRIMARY CARE CLINIC | Age: 54
End: 2019-11-04
Payer: COMMERCIAL

## 2019-11-04 VITALS
BODY MASS INDEX: 55.15 KG/M2 | DIASTOLIC BLOOD PRESSURE: 80 MMHG | OXYGEN SATURATION: 98 % | WEIGHT: 315 LBS | TEMPERATURE: 98.2 F | SYSTOLIC BLOOD PRESSURE: 132 MMHG | HEART RATE: 83 BPM

## 2019-11-04 DIAGNOSIS — I15.9 SECONDARY HYPERTENSION: ICD-10-CM

## 2019-11-04 DIAGNOSIS — E11.9 TYPE 2 DIABETES MELLITUS WITHOUT COMPLICATION, WITHOUT LONG-TERM CURRENT USE OF INSULIN (HCC): ICD-10-CM

## 2019-11-04 DIAGNOSIS — M25.511 CHRONIC RIGHT SHOULDER PAIN: ICD-10-CM

## 2019-11-04 DIAGNOSIS — G89.29 CHRONIC RIGHT SHOULDER PAIN: ICD-10-CM

## 2019-11-04 DIAGNOSIS — Z12.11 COLON CANCER SCREENING: Primary | ICD-10-CM

## 2019-11-04 LAB
ALBUMIN SERPL-MCNC: 4.5 G/DL (ref 3.5–5.2)
ALP BLD-CCNC: 83 U/L (ref 40–129)
ALT SERPL-CCNC: 19 U/L (ref 0–40)
ANION GAP SERPL CALCULATED.3IONS-SCNC: 14 MMOL/L (ref 7–16)
AST SERPL-CCNC: 19 U/L (ref 0–39)
BILIRUB SERPL-MCNC: 0.4 MG/DL (ref 0–1.2)
BUN BLDV-MCNC: 14 MG/DL (ref 6–20)
CALCIUM SERPL-MCNC: 9.3 MG/DL (ref 8.6–10.2)
CHLORIDE BLD-SCNC: 100 MMOL/L (ref 98–107)
CHOLESTEROL, TOTAL: 165 MG/DL (ref 0–199)
CO2: 22 MMOL/L (ref 22–29)
CREAT SERPL-MCNC: 0.9 MG/DL (ref 0.7–1.2)
CREATININE URINE: 154 MG/DL (ref 40–278)
GFR AFRICAN AMERICAN: >60
GFR NON-AFRICAN AMERICAN: >60 ML/MIN/1.73
GLUCOSE BLD-MCNC: 110 MG/DL (ref 74–99)
HBA1C MFR BLD: 6 % (ref 4–5.6)
HCT VFR BLD CALC: 44.2 % (ref 37–54)
HDLC SERPL-MCNC: 33 MG/DL
HEMOGLOBIN: 13.7 G/DL (ref 12.5–16.5)
LDL CHOLESTEROL CALCULATED: 103 MG/DL (ref 0–99)
MCH RBC QN AUTO: 28 PG (ref 26–35)
MCHC RBC AUTO-ENTMCNC: 31 % (ref 32–34.5)
MCV RBC AUTO: 90.4 FL (ref 80–99.9)
MICROALBUMIN UR-MCNC: <12 MG/L
MICROALBUMIN/CREAT UR-RTO: ABNORMAL (ref 0–30)
PDW BLD-RTO: 14.6 FL (ref 11.5–15)
PLATELET # BLD: 281 E9/L (ref 130–450)
PMV BLD AUTO: 10.5 FL (ref 7–12)
POTASSIUM SERPL-SCNC: 4.5 MMOL/L (ref 3.5–5)
RBC # BLD: 4.89 E12/L (ref 3.8–5.8)
SODIUM BLD-SCNC: 136 MMOL/L (ref 132–146)
TOTAL PROTEIN: 7.6 G/DL (ref 6.4–8.3)
TRIGL SERPL-MCNC: 147 MG/DL (ref 0–149)
VLDLC SERPL CALC-MCNC: 29 MG/DL
WBC # BLD: 7.6 E9/L (ref 4.5–11.5)

## 2019-11-04 PROCEDURE — 99214 OFFICE O/P EST MOD 30 MIN: CPT | Performed by: INTERNAL MEDICINE

## 2019-11-04 PROCEDURE — 82570 ASSAY OF URINE CREATININE: CPT

## 2019-11-04 PROCEDURE — 83036 HEMOGLOBIN GLYCOSYLATED A1C: CPT

## 2019-11-04 PROCEDURE — G8427 DOCREV CUR MEDS BY ELIG CLIN: HCPCS | Performed by: INTERNAL MEDICINE

## 2019-11-04 PROCEDURE — 80053 COMPREHEN METABOLIC PANEL: CPT

## 2019-11-04 PROCEDURE — 1036F TOBACCO NON-USER: CPT | Performed by: INTERNAL MEDICINE

## 2019-11-04 PROCEDURE — 82044 UR ALBUMIN SEMIQUANTITATIVE: CPT

## 2019-11-04 PROCEDURE — 80061 LIPID PANEL: CPT

## 2019-11-04 PROCEDURE — G8484 FLU IMMUNIZE NO ADMIN: HCPCS | Performed by: INTERNAL MEDICINE

## 2019-11-04 PROCEDURE — 3017F COLORECTAL CA SCREEN DOC REV: CPT | Performed by: INTERNAL MEDICINE

## 2019-11-04 PROCEDURE — 85027 COMPLETE CBC AUTOMATED: CPT

## 2019-11-04 PROCEDURE — 2022F DILAT RTA XM EVC RTNOPTHY: CPT | Performed by: INTERNAL MEDICINE

## 2019-11-04 PROCEDURE — 3046F HEMOGLOBIN A1C LEVEL >9.0%: CPT | Performed by: INTERNAL MEDICINE

## 2019-11-04 PROCEDURE — G8417 CALC BMI ABV UP PARAM F/U: HCPCS | Performed by: INTERNAL MEDICINE

## 2019-11-04 RX ORDER — LISINOPRIL 40 MG/1
20 TABLET ORAL 2 TIMES DAILY
Qty: 90 TABLET | Refills: 1 | Status: SHIPPED
Start: 2019-11-04 | End: 2020-06-25

## 2019-11-04 RX ORDER — PIOGLITAZONEHYDROCHLORIDE 30 MG/1
15 TABLET ORAL DAILY
Qty: 45 TABLET | Refills: 1 | Status: SHIPPED
Start: 2019-11-04 | End: 2020-05-14

## 2019-11-07 ENCOUNTER — HOSPITAL ENCOUNTER (OUTPATIENT)
Dept: GENERAL RADIOLOGY | Age: 54
Discharge: HOME | End: 2019-11-09
Payer: COMMERCIAL

## 2019-11-07 ENCOUNTER — HOSPITAL ENCOUNTER (OUTPATIENT)
Age: 54
Discharge: HOME | End: 2019-11-09
Payer: COMMERCIAL

## 2019-11-07 DIAGNOSIS — M25.511 CHRONIC RIGHT SHOULDER PAIN: ICD-10-CM

## 2019-11-07 DIAGNOSIS — G89.29 CHRONIC RIGHT SHOULDER PAIN: ICD-10-CM

## 2019-11-07 PROCEDURE — 73030 X-RAY EXAM OF SHOULDER: CPT

## 2019-11-15 DIAGNOSIS — M25.511 CHRONIC RIGHT SHOULDER PAIN: Primary | ICD-10-CM

## 2019-11-15 DIAGNOSIS — G89.29 CHRONIC RIGHT SHOULDER PAIN: Primary | ICD-10-CM

## 2019-12-04 ENCOUNTER — OFFICE VISIT (OUTPATIENT)
Dept: ORTHOPEDIC SURGERY | Age: 54
End: 2019-12-04
Payer: COMMERCIAL

## 2019-12-04 VITALS
WEIGHT: 315 LBS | SYSTOLIC BLOOD PRESSURE: 132 MMHG | HEART RATE: 80 BPM | BODY MASS INDEX: 41.75 KG/M2 | DIASTOLIC BLOOD PRESSURE: 73 MMHG | HEIGHT: 73 IN

## 2019-12-04 DIAGNOSIS — M25.511 RIGHT SHOULDER PAIN, UNSPECIFIED CHRONICITY: Primary | ICD-10-CM

## 2019-12-04 PROCEDURE — 1036F TOBACCO NON-USER: CPT | Performed by: ORTHOPAEDIC SURGERY

## 2019-12-04 PROCEDURE — G8428 CUR MEDS NOT DOCUMENT: HCPCS | Performed by: ORTHOPAEDIC SURGERY

## 2019-12-04 PROCEDURE — 20610 DRAIN/INJ JOINT/BURSA W/O US: CPT | Performed by: ORTHOPAEDIC SURGERY

## 2019-12-04 PROCEDURE — G8484 FLU IMMUNIZE NO ADMIN: HCPCS | Performed by: ORTHOPAEDIC SURGERY

## 2019-12-04 PROCEDURE — 3017F COLORECTAL CA SCREEN DOC REV: CPT | Performed by: ORTHOPAEDIC SURGERY

## 2019-12-04 PROCEDURE — G8417 CALC BMI ABV UP PARAM F/U: HCPCS | Performed by: ORTHOPAEDIC SURGERY

## 2019-12-04 PROCEDURE — 99203 OFFICE O/P NEW LOW 30 MIN: CPT | Performed by: ORTHOPAEDIC SURGERY

## 2019-12-04 RX ORDER — TRIAMCINOLONE ACETONIDE 40 MG/ML
40 INJECTION, SUSPENSION INTRA-ARTICULAR; INTRAMUSCULAR ONCE
Status: COMPLETED | OUTPATIENT
Start: 2019-12-04 | End: 2019-12-04

## 2019-12-04 RX ORDER — LIDOCAINE HYDROCHLORIDE 10 MG/ML
4 INJECTION, SOLUTION INFILTRATION; PERINEURAL ONCE
Status: COMPLETED | OUTPATIENT
Start: 2019-12-04 | End: 2019-12-04

## 2019-12-04 RX ADMIN — LIDOCAINE HYDROCHLORIDE 4 ML: 10 INJECTION, SOLUTION INFILTRATION; PERINEURAL at 10:13

## 2019-12-04 RX ADMIN — TRIAMCINOLONE ACETONIDE 40 MG: 40 INJECTION, SUSPENSION INTRA-ARTICULAR; INTRAMUSCULAR at 10:13

## 2019-12-12 ENCOUNTER — OFFICE VISIT (OUTPATIENT)
Dept: FAMILY MEDICINE CLINIC | Age: 54
End: 2019-12-12
Payer: COMMERCIAL

## 2019-12-12 VITALS
SYSTOLIC BLOOD PRESSURE: 138 MMHG | RESPIRATION RATE: 14 BRPM | BODY MASS INDEX: 54.22 KG/M2 | WEIGHT: 315 LBS | TEMPERATURE: 99.6 F | OXYGEN SATURATION: 97 % | HEART RATE: 104 BPM | DIASTOLIC BLOOD PRESSURE: 80 MMHG

## 2019-12-12 DIAGNOSIS — R05.9 COUGH: ICD-10-CM

## 2019-12-12 DIAGNOSIS — J18.9 PNEUMONIA OF RIGHT MIDDLE LOBE DUE TO INFECTIOUS ORGANISM: Primary | ICD-10-CM

## 2019-12-12 PROCEDURE — 99213 OFFICE O/P EST LOW 20 MIN: CPT | Performed by: NURSE PRACTITIONER

## 2019-12-12 RX ORDER — PREDNISONE 1 MG/1
TABLET ORAL
Qty: 21 TABLET | Refills: 0 | Status: SHIPPED | OUTPATIENT
Start: 2019-12-12 | End: 2019-12-21

## 2019-12-12 RX ORDER — CEFDINIR 300 MG/1
300 CAPSULE ORAL 2 TIMES DAILY
Qty: 20 CAPSULE | Refills: 0 | Status: SHIPPED | OUTPATIENT
Start: 2019-12-12 | End: 2019-12-22

## 2019-12-18 ENCOUNTER — OFFICE VISIT (OUTPATIENT)
Dept: FAMILY MEDICINE CLINIC | Age: 54
End: 2019-12-18
Payer: COMMERCIAL

## 2019-12-18 VITALS
SYSTOLIC BLOOD PRESSURE: 130 MMHG | TEMPERATURE: 97.8 F | RESPIRATION RATE: 20 BRPM | HEART RATE: 91 BPM | WEIGHT: 315 LBS | OXYGEN SATURATION: 99 % | DIASTOLIC BLOOD PRESSURE: 86 MMHG | BODY MASS INDEX: 41.75 KG/M2 | HEIGHT: 73 IN

## 2019-12-18 DIAGNOSIS — R21 RASH AND NONSPECIFIC SKIN ERUPTION: ICD-10-CM

## 2019-12-18 DIAGNOSIS — J18.9 PNEUMONIA OF RIGHT MIDDLE LOBE DUE TO INFECTIOUS ORGANISM: Primary | ICD-10-CM

## 2019-12-18 PROCEDURE — 3017F COLORECTAL CA SCREEN DOC REV: CPT | Performed by: PHYSICIAN ASSISTANT

## 2019-12-18 PROCEDURE — G8427 DOCREV CUR MEDS BY ELIG CLIN: HCPCS | Performed by: PHYSICIAN ASSISTANT

## 2019-12-18 PROCEDURE — G8484 FLU IMMUNIZE NO ADMIN: HCPCS | Performed by: PHYSICIAN ASSISTANT

## 2019-12-18 PROCEDURE — 1036F TOBACCO NON-USER: CPT | Performed by: PHYSICIAN ASSISTANT

## 2019-12-18 PROCEDURE — G8417 CALC BMI ABV UP PARAM F/U: HCPCS | Performed by: PHYSICIAN ASSISTANT

## 2019-12-18 PROCEDURE — 99214 OFFICE O/P EST MOD 30 MIN: CPT | Performed by: PHYSICIAN ASSISTANT

## 2019-12-18 RX ORDER — CLINDAMYCIN HYDROCHLORIDE 300 MG/1
300 CAPSULE ORAL 4 TIMES DAILY
Qty: 40 CAPSULE | Refills: 0 | Status: SHIPPED | OUTPATIENT
Start: 2019-12-18 | End: 2019-12-28

## 2019-12-27 ENCOUNTER — OFFICE VISIT (OUTPATIENT)
Dept: PRIMARY CARE CLINIC | Age: 54
End: 2019-12-27
Payer: COMMERCIAL

## 2019-12-27 VITALS
TEMPERATURE: 97.3 F | OXYGEN SATURATION: 97 % | DIASTOLIC BLOOD PRESSURE: 70 MMHG | BODY MASS INDEX: 53.96 KG/M2 | WEIGHT: 315 LBS | SYSTOLIC BLOOD PRESSURE: 110 MMHG | HEART RATE: 98 BPM

## 2019-12-27 DIAGNOSIS — J40 BRONCHITIS: Primary | ICD-10-CM

## 2019-12-27 PROCEDURE — G8427 DOCREV CUR MEDS BY ELIG CLIN: HCPCS | Performed by: INTERNAL MEDICINE

## 2019-12-27 PROCEDURE — 99213 OFFICE O/P EST LOW 20 MIN: CPT | Performed by: INTERNAL MEDICINE

## 2019-12-27 PROCEDURE — G8417 CALC BMI ABV UP PARAM F/U: HCPCS | Performed by: INTERNAL MEDICINE

## 2019-12-27 PROCEDURE — 3017F COLORECTAL CA SCREEN DOC REV: CPT | Performed by: INTERNAL MEDICINE

## 2019-12-27 PROCEDURE — 1036F TOBACCO NON-USER: CPT | Performed by: INTERNAL MEDICINE

## 2019-12-27 PROCEDURE — G8484 FLU IMMUNIZE NO ADMIN: HCPCS | Performed by: INTERNAL MEDICINE

## 2019-12-27 RX ORDER — AZITHROMYCIN 250 MG/1
250 TABLET, FILM COATED ORAL SEE ADMIN INSTRUCTIONS
Qty: 6 TABLET | Refills: 0 | Status: SHIPPED | OUTPATIENT
Start: 2019-12-27 | End: 2020-01-01

## 2020-01-31 ENCOUNTER — OFFICE VISIT (OUTPATIENT)
Dept: PRIMARY CARE CLINIC | Age: 55
End: 2020-01-31
Payer: COMMERCIAL

## 2020-01-31 VITALS
HEART RATE: 73 BPM | WEIGHT: 315 LBS | BODY MASS INDEX: 55.68 KG/M2 | OXYGEN SATURATION: 95 % | TEMPERATURE: 98.2 F | DIASTOLIC BLOOD PRESSURE: 80 MMHG | SYSTOLIC BLOOD PRESSURE: 138 MMHG

## 2020-01-31 PROCEDURE — G8484 FLU IMMUNIZE NO ADMIN: HCPCS | Performed by: INTERNAL MEDICINE

## 2020-01-31 PROCEDURE — G8427 DOCREV CUR MEDS BY ELIG CLIN: HCPCS | Performed by: INTERNAL MEDICINE

## 2020-01-31 PROCEDURE — 3017F COLORECTAL CA SCREEN DOC REV: CPT | Performed by: INTERNAL MEDICINE

## 2020-01-31 PROCEDURE — G8417 CALC BMI ABV UP PARAM F/U: HCPCS | Performed by: INTERNAL MEDICINE

## 2020-01-31 PROCEDURE — 1036F TOBACCO NON-USER: CPT | Performed by: INTERNAL MEDICINE

## 2020-01-31 PROCEDURE — 99213 OFFICE O/P EST LOW 20 MIN: CPT | Performed by: INTERNAL MEDICINE

## 2020-01-31 RX ORDER — PETROLATUM 42 G/100G
OINTMENT TOPICAL
Qty: 1 TUBE | Refills: 0 | Status: SHIPPED
Start: 2020-01-31 | End: 2020-09-08

## 2020-01-31 ASSESSMENT — PATIENT HEALTH QUESTIONNAIRE - PHQ9
2. FEELING DOWN, DEPRESSED OR HOPELESS: 0
SUM OF ALL RESPONSES TO PHQ QUESTIONS 1-9: 0
SUM OF ALL RESPONSES TO PHQ QUESTIONS 1-9: 0
1. LITTLE INTEREST OR PLEASURE IN DOING THINGS: 0
SUM OF ALL RESPONSES TO PHQ9 QUESTIONS 1 & 2: 0

## 2020-01-31 NOTE — PROGRESS NOTES
DO         Review of Systems  Constitutional:Negative for activity change, appetite change, chills, fatigue and fever. Respiratory: Negative for choking, chest tightness, shortness of breath and wheezing. Cardiovascular: Negative for chest pain, palpitations and leg swelling. Gastrointestinal: Negative for abdominal distention, constipation, diarrhea, nausea and vomiting. Musculoskeletal: Negative for arthralgias, back pain, gait problem and joint swelling. Neurological: Negative for dizziness, weakness,numbness and headaches. /80   Pulse 73   Temp 98.2 °F (36.8 °C)   Wt (!) 422 lb (191.4 kg)   SpO2 95%   BMI 55.68 kg/m²      Physical Exam   Constitutional:  Oriented to person, place, and time. Appears well-developed and well-nourished. No acute distress. HENT: No sinus tenderness or lymphadenopathy  Head: Normocephalic and atraumatic. Eyes: Eyes exhibits no discharge. No scleral icterus present. Neck: No tracheal deviation present. No thyromegaly present. Cardiovascular: Normal rate, regular rhythm, normal heart sounds and intact distal pulses. Exam reveals no gallop nor friction rub. No murmur heard. Pulmonary: Effort normal and breath sounds normal. No respiratory distress. No wheezes or rales. Musculoskeletal:  No tenderness to palpation  Neurological:Alert and oriented to person, place, and time. Skin: No diaphoresis. Psychiatric: Normal mood and affect. Behavior is Normal.     ASSESSMENT AND PLAN:    Raghav Stone was seen today for cyst.    Diagnoses and all orders for this visit:    Skin infection  -     mupirocin (BACTROBAN) 2 % ointment; Apply 3 times daily. -     mineral oil-hydrophilic petrolatum (HYDROPHOR) ointment; Apply topically as needed. Persistent cough  -     XR CHEST STANDARD (2 VW); Future        Donjnmarilee Chaffee presents today for evaluation of a skin lesion at the cleft of his buttocks. I do feel it is likely impetigo.   There is one area that may have

## 2020-02-11 NOTE — PROGRESS NOTES
Follow Up Visit     Katie Cullen returns today for follow up visit regarding Right shoulder bursitis/rotator cuff tendinitis . Treatment thus far has included Cortisone injection in right shoulder on 12/4/19 performed last visit, with good improvement and home exercise for shoulder. He reports symptoms are improved. Physical Exam:     Height: 6' 1\" (1.854 m), Weight: (!) 420 lb (190.5 kg) (per pt), BP: (!) 143/82    General: Alert and oriented x3, no acute distress  Cardiovascular/pulmonary: No labored breathing, peripheral perfusion intact  Musculoskeletal:    On exam of the shoulder, he has full range of motion without pain. He has good strength with rotator cuff testing. Controlled Substances Monitoring:      Imaging:  No new imaging. Previous images reviewed      Assessment: Right shoulder pain that has responded to steroid injection      Plan:   He is doing well. We will continue with conservative treatment in the form of observation at this point. He will call us for follow-up if his symptoms return.     Kendall Perez MD  Orthopaedic Surgery   2/11/20  3:35 PM

## 2020-02-12 ENCOUNTER — OFFICE VISIT (OUTPATIENT)
Dept: ORTHOPEDIC SURGERY | Age: 55
End: 2020-02-12
Payer: COMMERCIAL

## 2020-02-12 VITALS
HEIGHT: 73 IN | HEART RATE: 87 BPM | BODY MASS INDEX: 41.75 KG/M2 | SYSTOLIC BLOOD PRESSURE: 143 MMHG | DIASTOLIC BLOOD PRESSURE: 82 MMHG | WEIGHT: 315 LBS

## 2020-02-12 PROCEDURE — 1036F TOBACCO NON-USER: CPT | Performed by: ORTHOPAEDIC SURGERY

## 2020-02-12 PROCEDURE — 3017F COLORECTAL CA SCREEN DOC REV: CPT | Performed by: ORTHOPAEDIC SURGERY

## 2020-02-12 PROCEDURE — G8427 DOCREV CUR MEDS BY ELIG CLIN: HCPCS | Performed by: ORTHOPAEDIC SURGERY

## 2020-02-12 PROCEDURE — G8417 CALC BMI ABV UP PARAM F/U: HCPCS | Performed by: ORTHOPAEDIC SURGERY

## 2020-02-12 PROCEDURE — G8484 FLU IMMUNIZE NO ADMIN: HCPCS | Performed by: ORTHOPAEDIC SURGERY

## 2020-02-12 PROCEDURE — 99213 OFFICE O/P EST LOW 20 MIN: CPT | Performed by: ORTHOPAEDIC SURGERY

## 2020-02-14 ENCOUNTER — HOSPITAL ENCOUNTER (OUTPATIENT)
Dept: PULMONOLOGY | Age: 55
Discharge: HOME | End: 2020-02-14
Payer: COMMERCIAL

## 2020-02-14 ENCOUNTER — HOSPITAL ENCOUNTER (OUTPATIENT)
Age: 55
Discharge: HOME | End: 2020-02-16
Payer: COMMERCIAL

## 2020-02-14 ENCOUNTER — HOSPITAL ENCOUNTER (OUTPATIENT)
Dept: GENERAL RADIOLOGY | Age: 55
Discharge: HOME | End: 2020-02-16
Payer: COMMERCIAL

## 2020-02-14 PROCEDURE — 71046 X-RAY EXAM CHEST 2 VIEWS: CPT

## 2020-02-14 PROCEDURE — 94729 DIFFUSING CAPACITY: CPT

## 2020-02-14 PROCEDURE — 94726 PLETHYSMOGRAPHY LUNG VOLUMES: CPT

## 2020-02-14 PROCEDURE — 94060 EVALUATION OF WHEEZING: CPT

## 2020-02-20 NOTE — PROCEDURES
[id number] [address]                               PULMONARY FUNCTION    PATIENT NAME: Davis Rodriguez                        :        1965  MED REC NO:   [id number]                            ROOM:  ACCOUNT NO:   [de-identified]                           ADMIT DATE: 2020  PROVIDER:     Katherin Demarco DO    DATE OF PROCEDURE:  2020    COMMENTS:  Pulmonary function test completed with good effort and  cooperation, given bronchodilator. SPIROMETRY:  Forced vital capacity is 3.97 liters, which is 76%  predicted. FEV1 is 3.01 liters, which is 74% of predicted. FEV1/FVC  ratio is 76%. The GVM38-94% is reduced at 2.33, which is 66% of  predicted. Expiratory flow rates are decreased. The FVC, FEV1, and FEF 25-75% rates are decreased indicating obstruction. Following aerosol bronchodilator, there  is no significant response in FEC or FEV1. There is a significant  response in the small airways from 66% to 87%, which is a 31% change  indicating component of small airway reversibility. The MVV is 119  liters per minute, which is 77%. LUNG VOLUMES:  Total lung capacity is 7.31 liters, which is 99%. Residual volume is 3.12 liters, which is 140% predicted. RV/TLC is  mildly increased at 43% indicating air trapping. Diffusion uncorrected  for hemoglobin is 43.17, which is 123% of predicted; when corrected for  ventilation is 144%. Flow volume loop with minimal curving on expiratory limb indicating  obstruction. No intrathoracic or extrathoracic obstruction noted. PFT INTERPRETATION:  1. Moderate obstructive lung defect without response to bronchodilator  in FVC or FEV1.  2.  There is a significant response to bronchodilator in the small  airways indicating a component of reversibility. 3.  Air trapping noted on lung volumes. 4.  Normal diffusion.   5.  Flow volume loop with mild curving indicating obstruction. CONCLUSION:  Moderate obstructive lung defect with partial small airway  reversibility, air trapping, and normal diffusion. Clinical correlation advised.         Iraj Celeste DO    D: 02/19/2020 9:06:17       T: 02/19/2020 10:24:50     /[email or web address]  Job#: [id number]     Doc#: [id number]    CC:

## 2020-02-24 RX ORDER — FLUTICASONE FUROATE AND VILANTEROL 100; 25 UG/1; UG/1
1 POWDER RESPIRATORY (INHALATION) DAILY
Qty: 4 EACH | Refills: 2 | Status: SHIPPED
Start: 2020-02-24 | End: 2020-03-04 | Stop reason: ALTCHOICE

## 2020-02-25 ENCOUNTER — TELEPHONE (OUTPATIENT)
Dept: PRIMARY CARE CLINIC | Age: 55
End: 2020-02-25

## 2020-03-04 ENCOUNTER — OFFICE VISIT (OUTPATIENT)
Dept: PRIMARY CARE CLINIC | Age: 55
End: 2020-03-04
Payer: COMMERCIAL

## 2020-03-04 VITALS
SYSTOLIC BLOOD PRESSURE: 134 MMHG | TEMPERATURE: 97.3 F | BODY MASS INDEX: 56.07 KG/M2 | WEIGHT: 315 LBS | OXYGEN SATURATION: 98 % | HEART RATE: 83 BPM | DIASTOLIC BLOOD PRESSURE: 80 MMHG

## 2020-03-04 PROCEDURE — 90471 IMMUNIZATION ADMIN: CPT | Performed by: INTERNAL MEDICINE

## 2020-03-04 PROCEDURE — 3017F COLORECTAL CA SCREEN DOC REV: CPT | Performed by: INTERNAL MEDICINE

## 2020-03-04 PROCEDURE — G8417 CALC BMI ABV UP PARAM F/U: HCPCS | Performed by: INTERNAL MEDICINE

## 2020-03-04 PROCEDURE — G8484 FLU IMMUNIZE NO ADMIN: HCPCS | Performed by: INTERNAL MEDICINE

## 2020-03-04 PROCEDURE — 1036F TOBACCO NON-USER: CPT | Performed by: INTERNAL MEDICINE

## 2020-03-04 PROCEDURE — G8427 DOCREV CUR MEDS BY ELIG CLIN: HCPCS | Performed by: INTERNAL MEDICINE

## 2020-03-04 PROCEDURE — 99213 OFFICE O/P EST LOW 20 MIN: CPT | Performed by: INTERNAL MEDICINE

## 2020-03-04 PROCEDURE — 90732 PPSV23 VACC 2 YRS+ SUBQ/IM: CPT | Performed by: INTERNAL MEDICINE

## 2020-03-04 NOTE — PROGRESS NOTES
Intramuscular Once London Nett, DO         Review of Systems  Constitutional:Negative for activity change, appetite change, chills, fatigue and fever. Respiratory: Negative for choking, chest tightness, shortness of breath and wheezing. Cardiovascular: Negative for chest pain, palpitations and leg swelling. Gastrointestinal: Negative for abdominal distention, constipation, diarrhea, nausea and vomiting. Musculoskeletal: Negative for arthralgias, back pain, gait problem and joint swelling. Neurological: Negative for dizziness, weakness,numbness and headaches. /80   Pulse 83   Temp 97.3 °F (36.3 °C)   Wt (!) 425 lb (192.8 kg)   SpO2 98%   BMI 56.07 kg/m²      Physical Exam   Constitutional:  Oriented to person, place, and time. Appears well-developed and well-nourished. No acute distress. HENT: No sinus tenderness or lymphadenopathy  Head: Normocephalic and atraumatic. Eyes: Eyes exhibits no discharge. No scleral icterus present. Neck: No tracheal deviation present. No thyromegaly present. Cardiovascular: Normal rate, regular rhythm, normal heart sounds and intact distal pulses. Exam reveals no gallop nor friction rub. No murmur heard. Pulmonary: Effort normal and breath sounds normal. No respiratory distress. No wheezes or rales. Musculoskeletal:  No tenderness to palpation  Neurological:Alert and oriented to person, place, and time. Skin: No diaphoresis. Psychiatric: Normal mood and affect. Behavior is Normal.     ASSESSMENT AND PLAN:    Olga Lidia Lama was seen today for results.     Diagnoses and all orders for this visit:    Reactive airway disease without complication, unspecified asthma severity, unspecified whether persistent  - start asmanex  - multiple inhalers have been refused by insurance  - minimal symptoms today  - history of seasonal allergies  - reluctant to start any additional therapy  - refer to pulmonology  - will give pneumonia vaccine    Stopped Flonase due to anosmia    Please note that the above documentation was prepared using voice recognition software. Every attempt was made to ensure accuracy but there may be spelling, grammatical, and contextual errors. Electronically signed by Robyn Haddad DO on 3/4/2020 at 9:38 AM        No follow-ups on file.     Robyn Haddad DO

## 2020-04-09 ENCOUNTER — TELEPHONE (OUTPATIENT)
Dept: PRIMARY CARE CLINIC | Age: 55
End: 2020-04-09

## 2020-04-11 ENCOUNTER — OFFICE VISIT (OUTPATIENT)
Dept: PRIMARY CARE CLINIC | Age: 55
End: 2020-04-11
Payer: COMMERCIAL

## 2020-04-11 VITALS
BODY MASS INDEX: 41.75 KG/M2 | DIASTOLIC BLOOD PRESSURE: 78 MMHG | SYSTOLIC BLOOD PRESSURE: 136 MMHG | HEIGHT: 73 IN | HEART RATE: 97 BPM | WEIGHT: 315 LBS | OXYGEN SATURATION: 97 % | TEMPERATURE: 97.8 F

## 2020-04-11 PROBLEM — T78.40XA ALLERGIES: Status: ACTIVE | Noted: 2020-04-11

## 2020-04-11 PROBLEM — J01.00 ACUTE NON-RECURRENT MAXILLARY SINUSITIS: Status: ACTIVE | Noted: 2020-04-11

## 2020-04-11 PROCEDURE — 3017F COLORECTAL CA SCREEN DOC REV: CPT | Performed by: FAMILY MEDICINE

## 2020-04-11 PROCEDURE — G8417 CALC BMI ABV UP PARAM F/U: HCPCS | Performed by: FAMILY MEDICINE

## 2020-04-11 PROCEDURE — 1036F TOBACCO NON-USER: CPT | Performed by: FAMILY MEDICINE

## 2020-04-11 PROCEDURE — 99213 OFFICE O/P EST LOW 20 MIN: CPT | Performed by: FAMILY MEDICINE

## 2020-04-11 PROCEDURE — G8427 DOCREV CUR MEDS BY ELIG CLIN: HCPCS | Performed by: FAMILY MEDICINE

## 2020-04-11 RX ORDER — PREDNISONE 1 MG/1
TABLET ORAL
Qty: 30 TABLET | Refills: 0 | Status: SHIPPED
Start: 2020-04-11 | End: 2020-09-08

## 2020-04-11 RX ORDER — CLINDAMYCIN HYDROCHLORIDE 300 MG/1
300 CAPSULE ORAL 3 TIMES DAILY
Qty: 21 CAPSULE | Refills: 0 | Status: SHIPPED | OUTPATIENT
Start: 2020-04-11 | End: 2020-04-18

## 2020-04-11 RX ORDER — LATANOPROST 50 UG/ML
SOLUTION/ DROPS OPHTHALMIC
COMMUNITY
Start: 2020-03-16 | End: 2021-04-13

## 2020-04-11 RX ORDER — MOMETASONE FUROATE 220 UG/1
INHALANT RESPIRATORY (INHALATION)
COMMUNITY
Start: 2020-03-19 | End: 2020-09-05 | Stop reason: ALTCHOICE

## 2020-05-14 RX ORDER — PIOGLITAZONEHYDROCHLORIDE 30 MG/1
TABLET ORAL
Qty: 45 TABLET | Refills: 0 | Status: SHIPPED
Start: 2020-05-14 | End: 2020-08-31

## 2020-06-25 RX ORDER — LISINOPRIL 40 MG/1
TABLET ORAL
Qty: 90 TABLET | Refills: 0 | Status: SHIPPED
Start: 2020-06-25 | End: 2020-10-01

## 2020-08-31 RX ORDER — PIOGLITAZONEHYDROCHLORIDE 30 MG/1
TABLET ORAL
Qty: 45 TABLET | Refills: 0 | Status: SHIPPED
Start: 2020-08-31 | End: 2020-10-23 | Stop reason: SDUPTHER

## 2020-09-05 ENCOUNTER — HOSPITAL ENCOUNTER (EMERGENCY)
Age: 55
Discharge: HOME | End: 2020-09-05
Payer: COMMERCIAL

## 2020-09-05 VITALS
BODY MASS INDEX: 41.75 KG/M2 | SYSTOLIC BLOOD PRESSURE: 140 MMHG | OXYGEN SATURATION: 97 % | HEIGHT: 73 IN | RESPIRATION RATE: 20 BRPM | DIASTOLIC BLOOD PRESSURE: 90 MMHG | TEMPERATURE: 97.2 F | HEART RATE: 92 BPM | WEIGHT: 315 LBS

## 2020-09-05 DIAGNOSIS — L30.4 INTERTRIGO: Primary | ICD-10-CM

## 2020-09-05 PROCEDURE — 99212 OFFICE O/P EST SF 10 MIN: CPT

## 2020-09-05 RX ORDER — FEXOFENADINE HCL 180 MG/1
180 TABLET ORAL DAILY
COMMUNITY
End: 2022-08-10

## 2020-09-05 RX ORDER — NYSTATIN 100000 U/G
CREAM TOPICAL
Qty: 60 G | Refills: 1 | Status: SHIPPED | OUTPATIENT
Start: 2020-09-05 | End: 2020-09-08

## 2020-09-05 ASSESSMENT — PAIN - FUNCTIONAL ASSESSMENT: PAIN_FUNCTIONAL_ASSESSMENT: ACTIVITIES ARE NOT PREVENTED

## 2020-09-05 ASSESSMENT — PAIN DESCRIPTION - ORIENTATION: ORIENTATION: RIGHT

## 2020-09-05 ASSESSMENT — PAIN DESCRIPTION - LOCATION: LOCATION: GROIN

## 2020-09-05 ASSESSMENT — PAIN DESCRIPTION - PAIN TYPE: TYPE: ACUTE PAIN

## 2020-09-05 ASSESSMENT — PAIN DESCRIPTION - DESCRIPTORS: DESCRIPTORS: BURNING

## 2020-09-05 ASSESSMENT — PAIN DESCRIPTION - FREQUENCY
FREQUENCY: CONTINUOUS
FREQUENCY: CONTINUOUS

## 2020-09-05 NOTE — ED PROVIDER NOTES
This is a 22-year-old male that presents to urgent care complaining of a fungal rash primarily in the right groin area. He denies any scrotal pain or penile discomfort. Patient states sometimes he will get these fungal rashes and has been using a topical spray. Also complains of a small red area to the right armpit area. Denies any fevers or chills. On first contact patient he appears to be in no acute distress. Review of Systems   Constitutional:        Pertinent positives and negatives are stated within HPI, all other systems reviewed and are negative. Physical Exam  Vitals signs and nursing note reviewed. Constitutional:       Appearance: He is well-developed. HENT:      Head: Normocephalic and atraumatic. Jaw: No trismus. Right Ear: Hearing, tympanic membrane, ear canal and external ear normal.      Left Ear: Hearing, tympanic membrane, ear canal and external ear normal.      Nose: Nose normal.      Right Sinus: No maxillary sinus tenderness or frontal sinus tenderness. Left Sinus: No maxillary sinus tenderness or frontal sinus tenderness. Mouth/Throat:      Pharynx: Uvula midline. No uvula swelling. Eyes:      General: Lids are normal.      Conjunctiva/sclera: Conjunctivae normal.      Pupils: Pupils are equal, round, and reactive to light. Neck:      Musculoskeletal: Normal range of motion and neck supple. Cardiovascular:      Rate and Rhythm: Normal rate and regular rhythm. Heart sounds: Normal heart sounds. No murmur. Pulmonary:      Effort: Pulmonary effort is normal.      Breath sounds: Normal breath sounds. Abdominal:      General: Bowel sounds are normal.      Palpations: Abdomen is soft. Abdomen is not rigid. Tenderness: There is no abdominal tenderness. There is no guarding or rebound. Skin:     General: Skin is warm and dry. Findings: Rash present. No abrasion.       Comments: Excoriated fungal type moist rash to right groin area 419 lb (190.1 kg)   SpO2 97%   BMI 55.28 kg/m²   Oxygen Saturation Interpretation: Normal      ------------------------------------------ PROGRESS NOTES ------------------------------------------   I have spoken with the patient and discussed todays results, in addition to providing specific details for the plan of care and counseling regarding the diagnosis and prognosis. Their questions are answered at this time and they are agreeable with the plan.      --------------------------------- ADDITIONAL PROVIDER NOTES ---------------------------------      This patient is stable for discharge. I have shared the specific conditions for return, as well as the importance of follow-up. * NOTE: This report was transcribed using voice recognition software. Every effort was made to ensure accuracy; however, inadvertent computerized transcription errors may be present.    --------------------------------- IMPRESSION AND DISPOSITION ---------------------------------    IMPRESSION  1.  Intertrigo        DISPOSITION  Disposition: Discharge to home  Patient condition is good            Lenka Gilmore PA-C  09/05/20 [id number]

## 2020-09-08 ENCOUNTER — OFFICE VISIT (OUTPATIENT)
Dept: PRIMARY CARE CLINIC | Age: 55
End: 2020-09-08
Payer: COMMERCIAL

## 2020-09-08 VITALS
SYSTOLIC BLOOD PRESSURE: 130 MMHG | DIASTOLIC BLOOD PRESSURE: 84 MMHG | TEMPERATURE: 98.3 F | BODY MASS INDEX: 55.94 KG/M2 | OXYGEN SATURATION: 97 % | HEART RATE: 104 BPM | WEIGHT: 315 LBS

## 2020-09-08 PROCEDURE — 1036F TOBACCO NON-USER: CPT | Performed by: INTERNAL MEDICINE

## 2020-09-08 PROCEDURE — G8427 DOCREV CUR MEDS BY ELIG CLIN: HCPCS | Performed by: INTERNAL MEDICINE

## 2020-09-08 PROCEDURE — 99213 OFFICE O/P EST LOW 20 MIN: CPT | Performed by: INTERNAL MEDICINE

## 2020-09-08 PROCEDURE — 3017F COLORECTAL CA SCREEN DOC REV: CPT | Performed by: INTERNAL MEDICINE

## 2020-09-08 PROCEDURE — G8417 CALC BMI ABV UP PARAM F/U: HCPCS | Performed by: INTERNAL MEDICINE

## 2020-09-08 RX ORDER — CLOTRIMAZOLE AND BETAMETHASONE DIPROPIONATE 10; .64 MG/G; MG/G
CREAM TOPICAL
Qty: 1 TUBE | Refills: 2 | Status: SHIPPED
Start: 2020-09-08 | End: 2021-04-23 | Stop reason: ALTCHOICE

## 2020-09-08 RX ORDER — CEPHALEXIN 500 MG/1
500 CAPSULE ORAL 3 TIMES DAILY
Qty: 21 CAPSULE | Refills: 0 | Status: SHIPPED | OUTPATIENT
Start: 2020-09-08 | End: 2020-09-15

## 2020-09-08 NOTE — PROGRESS NOTES
9/8/20    Tarah Love, a male of 47 y.o. came to the office     Tarah Love presents today for follow-up of a rash. Seen in UC on September 5. He was treated with nystatin. He stopped taking this yesterday. His symptoms have been worse. He has spots in his groin and in his arm. He has been having a hard time controlling his urinary stream.  He has been dealing with this issue for 10 days. Patient Active Problem List   Diagnosis    Chest pain    Type 2 diabetes mellitus without complication ([facility])    Class 3 obesity due to excess calories without serious comorbidity with body mass index (BMI) of 50.0 to 59.9 in adult    Secondary hypertension    Allergies    Acute non-recurrent maxillary sinusitis      Allergies   Allergen Reactions    Biaxin [Clarithromycin]      Racing heart      Ciprofloxacin     Levofloxacin Other (See Comments)     pain    Penicillins Hives    Sulfamethoxazole-Trimethoprim      Current Outpatient Medications on File Prior to Visit   Medication Sig Dispense Refill    fexofenadine (ALLEGRA) 180 MG tablet Take 180 mg by mouth daily      pioglitazone (ACTOS) 30 MG tablet Take 1/2 (one-half) tablet by mouth once daily 45 tablet 0    lisinopril (PRINIVIL;ZESTRIL) 40 MG tablet Take 1/2 (one-half) tablet by mouth twice daily 90 tablet 0    latanoprost (XALATAN) 0.005 % ophthalmic solution       albuterol sulfate  (90 Base) MCG/ACT inhaler Inhale 2 puffs into the lungs every 6 hours as needed for Wheezing      montelukast (SINGULAIR) 10 MG tablet Take 10 mg by mouth nightly.  albuterol (PROVENTIL) (2.5 MG/3ML) 0.083% nebulizer solution Take 2.5 mg by nebulization every 6 hours as needed.          Current Facility-Administered Medications on File Prior to Visit   Medication Dose Route Frequency Provider Last Rate Last Dose    methylPREDNISolone acetate (DEPO-MEDROL) injection 80 mg  80 mg Intramuscular Once Emir Montanez, DO         Review of Systems  Constitutional:Negative for activity change, appetite change, chills, fatigue and fever. Respiratory: Negative for choking, chest tightness, shortness of breath and wheezing. Cardiovascular: Negative for chest pain, palpitations and leg swelling. Gastrointestinal: Negative for abdominal distention, constipation, diarrhea, nausea and vomiting. Musculoskeletal: Negative for arthralgias, back pain, gait problem and joint swelling. Neurological: Negative for dizziness, weakness,numbness and headaches. /84   Pulse 104   Temp 98.3 °F (36.8 °C)   Wt (!) 424 lb (192.3 kg)   SpO2 97%   BMI 55.94 kg/m²      Physical Exam   Constitutional:  Oriented to person, place, and time. Appears well-developed and well-nourished. No acute distress. HENT: No sinus tenderness or lymphadenopathy  Head: Normocephalic and atraumatic. Eyes: Eyes exhibits no discharge. No scleral icterus present. Neck: No tracheal deviation present. No thyromegaly present. Cardiovascular: Normal rate, regular rhythm, normal heart sounds and intact distal pulses. Exam reveals no gallop nor friction rub. No murmur heard. Pulmonary: Effort normal and breath sounds normal. No respiratory distress. No wheezes or rales. Musculoskeletal:  No tenderness to palpation  Neurological:Alert and oriented to person, place, and time. Skin: Ulceration and excoriation of the intertriginous folds of his groin. Psychiatric: Normal mood and affect. Behavior is Normal.     ASSESSMENT AND PLAN:    Roseanne Crenshaw was seen today for rash. Diagnoses and all orders for this visit:    Tinea cruris  -     clotrimazole-betamethasone (LOTRISONE) 1-0.05 % cream; Apply topically 2 times daily. Cellulitis, unspecified cellulitis site  -     cephALEXin (KEFLEX) 500 MG capsule; Take 1 capsule by mouth 3 times daily for 7 days        Erendira Villa presents today for evaluation of groin cellulitis. I will treat him with Keflex.   Also given Lotrisone

## 2020-09-14 ENCOUNTER — OFFICE VISIT (OUTPATIENT)
Dept: PRIMARY CARE CLINIC | Age: 55
End: 2020-09-14
Payer: COMMERCIAL

## 2020-09-14 VITALS
HEART RATE: 86 BPM | HEIGHT: 73 IN | OXYGEN SATURATION: 98 % | BODY MASS INDEX: 41.75 KG/M2 | WEIGHT: 315 LBS | DIASTOLIC BLOOD PRESSURE: 86 MMHG | SYSTOLIC BLOOD PRESSURE: 130 MMHG | TEMPERATURE: 98 F

## 2020-09-14 PROCEDURE — 99213 OFFICE O/P EST LOW 20 MIN: CPT | Performed by: INTERNAL MEDICINE

## 2020-09-14 PROCEDURE — G8417 CALC BMI ABV UP PARAM F/U: HCPCS | Performed by: INTERNAL MEDICINE

## 2020-09-14 PROCEDURE — G8427 DOCREV CUR MEDS BY ELIG CLIN: HCPCS | Performed by: INTERNAL MEDICINE

## 2020-09-14 PROCEDURE — 3017F COLORECTAL CA SCREEN DOC REV: CPT | Performed by: INTERNAL MEDICINE

## 2020-09-14 PROCEDURE — 1036F TOBACCO NON-USER: CPT | Performed by: INTERNAL MEDICINE

## 2020-09-14 RX ORDER — CLOBETASOL PROPIONATE 0.5 MG/G
OINTMENT TOPICAL
Qty: 1 TUBE | Refills: 0 | Status: SHIPPED
Start: 2020-09-14 | End: 2021-04-23 | Stop reason: ALTCHOICE

## 2020-09-14 NOTE — PROGRESS NOTES
9/14/20    Rimma Fowler, a male of 47 y.o. came to the office     Rimma Fowler presents today for follow-up of his rash. At last appointment he was given Lotrisone and Keflex for tinea cruris and cellulitis. The rash in his groin has been getting better. It does not feel as itchy or uncomfortable. Patient Active Problem List   Diagnosis    Chest pain    Type 2 diabetes mellitus without complication ([facility])    Class 3 obesity due to excess calories without serious comorbidity with body mass index (BMI) of 50.0 to 59.9 in adult    Secondary hypertension    Allergies    Acute non-recurrent maxillary sinusitis      Allergies   Allergen Reactions    Biaxin [Clarithromycin]      Racing heart      Ciprofloxacin     Levofloxacin Other (See Comments)     pain    Penicillins Hives    Sulfamethoxazole-Trimethoprim      Current Outpatient Medications on File Prior to Visit   Medication Sig Dispense Refill    clotrimazole-betamethasone (LOTRISONE) 1-0.05 % cream Apply topically 2 times daily. 1 Tube 2    cephALEXin (KEFLEX) 500 MG capsule Take 1 capsule by mouth 3 times daily for 7 days 21 capsule 0    fexofenadine (ALLEGRA) 180 MG tablet Take 180 mg by mouth daily      pioglitazone (ACTOS) 30 MG tablet Take 1/2 (one-half) tablet by mouth once daily 45 tablet 0    lisinopril (PRINIVIL;ZESTRIL) 40 MG tablet Take 1/2 (one-half) tablet by mouth twice daily 90 tablet 0    latanoprost (XALATAN) 0.005 % ophthalmic solution       albuterol sulfate  (90 Base) MCG/ACT inhaler Inhale 2 puffs into the lungs every 6 hours as needed for Wheezing      montelukast (SINGULAIR) 10 MG tablet Take 10 mg by mouth nightly.  albuterol (PROVENTIL) (2.5 MG/3ML) 0.083% nebulizer solution Take 2.5 mg by nebulization every 6 hours as needed.          Current Facility-Administered Medications on File Prior to Visit   Medication Dose Route Frequency Provider Last Rate Last Dose    methylPREDNISolone acetate (DEPO-MEDROL) injection 80 mg  80 mg Intramuscular Once Olivia Arenas,          Review of Systems  Constitutional:Negative for activity change, appetite change, chills, fatigue and fever. Respiratory: Negative for choking, chest tightness, shortness of breath and wheezing. Cardiovascular: Negative for chest pain, palpitations and leg swelling. Gastrointestinal: Negative for abdominal distention, constipation, diarrhea, nausea and vomiting. Musculoskeletal: Negative for arthralgias, back pain, gait problem and joint swelling. Neurological: Negative for dizziness, weakness,numbness and headaches. /86   Pulse 86   Temp 98 °F (36.7 °C)   Ht 6' 1\" (1.854 m)   Wt (!) 418 lb (189.6 kg)   SpO2 98%   BMI 55.15 kg/m²      Physical Exam   Constitutional:  Oriented to person, place, and time. Appears well-developed and well-nourished. No acute distress. HENT: No sinus tenderness or lymphadenopathy  Head: Normocephalic and atraumatic. Eyes: Eyes exhibits no discharge. No scleral icterus present. Neck: No tracheal deviation present. No thyromegaly present. Cardiovascular: Normal rate, regular rhythm, normal heart sounds and intact distal pulses. Exam reveals no gallop nor friction rub. No murmur heard. Pulmonary: Effort normal and breath sounds normal. No respiratory distress. No wheezes or rales. Musculoskeletal:  No tenderness to palpation  Neurological:Alert and oriented to person, place, and time. Skin: maceration of his groin, irritation of the axilla  Psychiatric: Normal mood and affect. Behavior is Normal.     ASSESSMENT AND PLAN:    Fredy Maxwell was seen today for rash and follow-up.     Diagnoses and all orders for this visit:    Irritant contact dermatitis, unspecified trigger  Follow up of dermatitis and cellulitis  Will refer to dermatology  I will prescribe steroids to the axilla  Advised to keep area dry  Continue lotrisone for groin  Continue to take keflex  -     clobetasol (Flakito Felix) 0.05 % ointment; Apply topically 2 times daily. Please note that the above documentation was prepared using voice recognition software. Every attempt was made to ensure accuracy but there may be spelling, grammatical, and contextual errors. Electronically signed by Kathy Nickerson DO on 9/14/2020 at 2:53 PM        No follow-ups on file.     Kathy Nickerson DO

## 2020-10-01 RX ORDER — LISINOPRIL 40 MG/1
TABLET ORAL
Qty: 90 TABLET | Refills: 0 | Status: SHIPPED
Start: 2020-10-01 | End: 2020-10-23 | Stop reason: SDUPTHER

## 2020-10-23 ENCOUNTER — OFFICE VISIT (OUTPATIENT)
Dept: PRIMARY CARE CLINIC | Age: 55
End: 2020-10-23
Payer: COMMERCIAL

## 2020-10-23 ENCOUNTER — HOSPITAL ENCOUNTER (OUTPATIENT)
Age: 55
Discharge: HOME | End: 2020-10-25
Payer: COMMERCIAL

## 2020-10-23 VITALS
TEMPERATURE: 97.2 F | BODY MASS INDEX: 55.28 KG/M2 | WEIGHT: 315 LBS | OXYGEN SATURATION: 95 % | HEART RATE: 93 BPM | SYSTOLIC BLOOD PRESSURE: 110 MMHG | DIASTOLIC BLOOD PRESSURE: 78 MMHG

## 2020-10-23 PROCEDURE — 2022F DILAT RTA XM EVC RTNOPTHY: CPT | Performed by: INTERNAL MEDICINE

## 2020-10-23 PROCEDURE — 90471 IMMUNIZATION ADMIN: CPT | Performed by: INTERNAL MEDICINE

## 2020-10-23 PROCEDURE — 80053 COMPREHEN METABOLIC PANEL: CPT

## 2020-10-23 PROCEDURE — 99214 OFFICE O/P EST MOD 30 MIN: CPT | Performed by: INTERNAL MEDICINE

## 2020-10-23 PROCEDURE — 80061 LIPID PANEL: CPT

## 2020-10-23 PROCEDURE — G8427 DOCREV CUR MEDS BY ELIG CLIN: HCPCS | Performed by: INTERNAL MEDICINE

## 2020-10-23 PROCEDURE — 85027 COMPLETE CBC AUTOMATED: CPT

## 2020-10-23 PROCEDURE — G8417 CALC BMI ABV UP PARAM F/U: HCPCS | Performed by: INTERNAL MEDICINE

## 2020-10-23 PROCEDURE — G0103 PSA SCREENING: HCPCS

## 2020-10-23 PROCEDURE — G8482 FLU IMMUNIZE ORDER/ADMIN: HCPCS | Performed by: INTERNAL MEDICINE

## 2020-10-23 PROCEDURE — 3017F COLORECTAL CA SCREEN DOC REV: CPT | Performed by: INTERNAL MEDICINE

## 2020-10-23 PROCEDURE — 1036F TOBACCO NON-USER: CPT | Performed by: INTERNAL MEDICINE

## 2020-10-23 PROCEDURE — 3046F HEMOGLOBIN A1C LEVEL >9.0%: CPT | Performed by: INTERNAL MEDICINE

## 2020-10-23 PROCEDURE — 90686 IIV4 VACC NO PRSV 0.5 ML IM: CPT | Performed by: INTERNAL MEDICINE

## 2020-10-23 PROCEDURE — 83036 HEMOGLOBIN GLYCOSYLATED A1C: CPT

## 2020-10-23 RX ORDER — LISINOPRIL 40 MG/1
TABLET ORAL
Qty: 90 TABLET | Refills: 0 | Status: SHIPPED
Start: 2020-10-23 | End: 2021-04-05 | Stop reason: SDUPTHER

## 2020-10-23 RX ORDER — PIOGLITAZONEHYDROCHLORIDE 30 MG/1
TABLET ORAL
Qty: 45 TABLET | Refills: 5 | Status: SHIPPED
Start: 2020-10-23 | End: 2021-04-23 | Stop reason: SDUPTHER

## 2020-10-23 NOTE — PROGRESS NOTES
10/23/20    Elza Maldonado, a male of 47 y.o. came to the office     Elza Maldonado presents today for routine follow up. He has been doing well on the current medications without any side effects. He denies any physical complaints including chest pains shortness of breath palpitations or edema. Patient Active Problem List   Diagnosis    Chest pain    Type 2 diabetes mellitus without complication ([facility])    Class 3 obesity due to excess calories without serious comorbidity with body mass index (BMI) of 50.0 to 59.9 in adult    Secondary hypertension    Allergies    Acute non-recurrent maxillary sinusitis      Allergies   Allergen Reactions    Biaxin [Clarithromycin]      Racing heart      Ciprofloxacin     Levofloxacin Other (See Comments)     pain    Penicillins Hives    Sulfamethoxazole-Trimethoprim      Current Outpatient Medications on File Prior to Visit   Medication Sig Dispense Refill    fexofenadine (ALLEGRA) 180 MG tablet Take 180 mg by mouth daily      latanoprost (XALATAN) 0.005 % ophthalmic solution       albuterol sulfate  (90 Base) MCG/ACT inhaler Inhale 2 puffs into the lungs every 6 hours as needed for Wheezing      montelukast (SINGULAIR) 10 MG tablet Take 10 mg by mouth nightly.  clobetasol (TEMOVATE) 0.05 % ointment Apply topically 2 times daily. (Patient not taking: Reported on 10/23/2020) 1 Tube 0    clotrimazole-betamethasone (LOTRISONE) 1-0.05 % cream Apply topically 2 times daily. (Patient not taking: Reported on 10/23/2020) 1 Tube 2    albuterol (PROVENTIL) (2.5 MG/3ML) 0.083% nebulizer solution Take 2.5 mg by nebulization every 6 hours as needed.          Current Facility-Administered Medications on File Prior to Visit   Medication Dose Route Frequency Provider Last Rate Last Dose    methylPREDNISolone acetate (DEPO-MEDROL) injection 80 mg  80 mg Intramuscular Once Sagar Davidson DO         Review of Systems  Constitutional:Negative for activity change, appetite change, chills, fatigue and fever. Respiratory: Negative for choking, chest tightness, shortness of breath and wheezing. Cardiovascular: Negative for chest pain, palpitations and leg swelling. Gastrointestinal: Negative for abdominal distention, constipation, diarrhea, nausea and vomiting. Musculoskeletal: Negative for arthralgias, back pain, gait problem and joint swelling. Neurological: Negative for dizziness, weakness,numbness and headaches. /78   Pulse 93   Temp 97.2 °F (36.2 °C)   Wt (!) 419 lb (190.1 kg)   SpO2 95%   BMI 55.28 kg/m²      Physical Exam   Constitutional:  Oriented to person, place, and time. Appears well-developed and well-nourished. No acute distress. HENT: No sinus tenderness or lymphadenopathy  Head: Normocephalic and atraumatic. Eyes: Eyes exhibits no discharge. No scleral icterus present. Neck: No tracheal deviation present. No thyromegaly present. Cardiovascular: Normal rate, regular rhythm, normal heart sounds and intact distal pulses. Exam reveals no gallop nor friction rub. No murmur heard. Pulmonary: Effort normal and breath sounds normal. No respiratory distress. No wheezes or rales. Musculoskeletal:  No tenderness to palpation  Neurological:Alert and oriented to person, place, and time. Skin: No diaphoresis. Psychiatric: Normal mood and affect. Behavior is Normal.     ASSESSMENT AND PLAN:    Missy Sylvester was seen today for diabetes, hypertension, blood work and immunizations. Diagnoses and all orders for this visit:    Need for influenza vaccination  -     INFLUENZA, QUADV, 3 YRS AND OLDER, IM PF, PREFILL SYR OR SDV, 0.5ML (AFLURIA QUADV, PF)    Secondary hypertension  -     lisinopril (PRINIVIL;ZESTRIL) 40 MG tablet; Take 1/2 (one-half) tablet by mouth twice daily    Type 2 diabetes mellitus without complication, without long-term current use of insulin (HCC)  -     pioglitazone (ACTOS) 30 MG tablet;  Take 1/2 (one-half) tablet by mouth once daily  -     CBC; Future  -     Comprehensive Metabolic Panel; Future  -     Hemoglobin A1C; Future  -     Lipid Panel; Future    Class 3 severe obesity with serious comorbidity and body mass index (BMI) of 50.0 to 59.9 in adult, unspecified obesity type [facility])          Please note that the above documentation was prepared using voice recognition software. Every attempt was made to ensure accuracy but there may be spelling, grammatical, and contextual errors. Electronically signed by Junito Corona DO on 10/23/2020 at 12:00 PM        No follow-ups on file.     Junito Corona DO

## 2020-10-24 LAB
ALBUMIN SERPL-MCNC: 4.2 G/DL (ref 3.5–5.2)
ALP BLD-CCNC: 67 U/L (ref 40–129)
ALT SERPL-CCNC: 24 U/L (ref 0–40)
ANION GAP SERPL CALCULATED.3IONS-SCNC: 12 MMOL/L (ref 7–16)
AST SERPL-CCNC: 23 U/L (ref 0–39)
BILIRUB SERPL-MCNC: 0.4 MG/DL (ref 0–1.2)
BUN BLDV-MCNC: 13 MG/DL (ref 6–20)
CALCIUM SERPL-MCNC: 9.4 MG/DL (ref 8.6–10.2)
CHLORIDE BLD-SCNC: 101 MMOL/L (ref 98–107)
CHOLESTEROL, TOTAL: 184 MG/DL (ref 0–199)
CO2: 23 MMOL/L (ref 22–29)
CREAT SERPL-MCNC: 1.3 MG/DL (ref 0.7–1.2)
GFR AFRICAN AMERICAN: >60
GFR NON-AFRICAN AMERICAN: 57 ML/MIN/1.73
GLUCOSE BLD-MCNC: 87 MG/DL (ref 74–99)
HBA1C MFR BLD: 6.3 % (ref 4–5.6)
HCT VFR BLD CALC: 44.1 % (ref 37–54)
HDLC SERPL-MCNC: 32 MG/DL
HEMOGLOBIN: 13.7 G/DL (ref 12.5–16.5)
LDL CHOLESTEROL CALCULATED: 115 MG/DL (ref 0–99)
MCH RBC QN AUTO: 28.1 PG (ref 26–35)
MCHC RBC AUTO-ENTMCNC: 31.1 % (ref 32–34.5)
MCV RBC AUTO: 90.6 FL (ref 80–99.9)
PDW BLD-RTO: 14.8 FL (ref 11.5–15)
PLATELET # BLD: 277 E9/L (ref 130–450)
PMV BLD AUTO: 10.2 FL (ref 7–12)
POTASSIUM SERPL-SCNC: 4.3 MMOL/L (ref 3.5–5)
PROSTATE SPECIFIC ANTIGEN: 2.34 NG/ML (ref 0–4)
RBC # BLD: 4.87 E12/L (ref 3.8–5.8)
SODIUM BLD-SCNC: 136 MMOL/L (ref 132–146)
TOTAL PROTEIN: 7.3 G/DL (ref 6.4–8.3)
TRIGL SERPL-MCNC: 187 MG/DL (ref 0–149)
VLDLC SERPL CALC-MCNC: 37 MG/DL
WBC # BLD: 6.7 E9/L (ref 4.5–11.5)

## 2020-11-19 ENCOUNTER — OFFICE VISIT (OUTPATIENT)
Dept: PRIMARY CARE CLINIC | Age: 55
End: 2020-11-19
Payer: COMMERCIAL

## 2020-11-19 VITALS
TEMPERATURE: 97.3 F | OXYGEN SATURATION: 97 % | DIASTOLIC BLOOD PRESSURE: 84 MMHG | WEIGHT: 315 LBS | HEART RATE: 84 BPM | SYSTOLIC BLOOD PRESSURE: 130 MMHG | BODY MASS INDEX: 41.75 KG/M2 | HEIGHT: 73 IN

## 2020-11-19 DIAGNOSIS — R79.89 CREATININE ELEVATION: ICD-10-CM

## 2020-11-19 LAB
ANION GAP SERPL CALCULATED.3IONS-SCNC: 12 MMOL/L (ref 7–16)
BUN BLDV-MCNC: 11 MG/DL (ref 6–20)
CALCIUM SERPL-MCNC: 9.4 MG/DL (ref 8.6–10.2)
CHLORIDE BLD-SCNC: 100 MMOL/L (ref 98–107)
CO2: 23 MMOL/L (ref 22–29)
CREAT SERPL-MCNC: 0.9 MG/DL (ref 0.7–1.2)
GFR AFRICAN AMERICAN: >60
GFR NON-AFRICAN AMERICAN: >60 ML/MIN/1.73
GLUCOSE BLD-MCNC: 90 MG/DL (ref 74–99)
POTASSIUM SERPL-SCNC: 4.4 MMOL/L (ref 3.5–5)
SODIUM BLD-SCNC: 135 MMOL/L (ref 132–146)

## 2020-11-19 PROCEDURE — G8417 CALC BMI ABV UP PARAM F/U: HCPCS | Performed by: INTERNAL MEDICINE

## 2020-11-19 PROCEDURE — G8427 DOCREV CUR MEDS BY ELIG CLIN: HCPCS | Performed by: INTERNAL MEDICINE

## 2020-11-19 PROCEDURE — 3017F COLORECTAL CA SCREEN DOC REV: CPT | Performed by: INTERNAL MEDICINE

## 2020-11-19 PROCEDURE — 99213 OFFICE O/P EST LOW 20 MIN: CPT | Performed by: INTERNAL MEDICINE

## 2020-11-19 PROCEDURE — 1036F TOBACCO NON-USER: CPT | Performed by: INTERNAL MEDICINE

## 2020-11-19 PROCEDURE — G8482 FLU IMMUNIZE ORDER/ADMIN: HCPCS | Performed by: INTERNAL MEDICINE

## 2020-11-19 RX ORDER — CLINDAMYCIN HYDROCHLORIDE 300 MG/1
300 CAPSULE ORAL 2 TIMES DAILY
Qty: 14 CAPSULE | Refills: 0 | Status: SHIPPED | OUTPATIENT
Start: 2020-11-19 | End: 2020-11-26

## 2020-11-19 NOTE — PROGRESS NOTES
11/19/20    Breanna Samayoa, a male of 54 y.o. came to the office     Breanna Samayoa presents today evaluation of sinusitis. Has had this for several days. He does have some drainage on the back of his throat. He denies any sore throat fevers chills or shortness of breath. He does occasionally have a productive cough    Patient Active Problem List   Diagnosis    Chest pain    Type 2 diabetes mellitus without complication (HCC)    Class 3 obesity due to excess calories without serious comorbidity with body mass index (BMI) of 50.0 to 59.9 in adult    Secondary hypertension    Allergies    Acute non-recurrent maxillary sinusitis      Allergies   Allergen Reactions    Biaxin [Clarithromycin]      Racing heart      Ciprofloxacin     Levofloxacin Other (See Comments)     pain    Penicillins Hives    Sulfamethoxazole-Trimethoprim      Current Outpatient Medications on File Prior to Visit   Medication Sig Dispense Refill    lisinopril (PRINIVIL;ZESTRIL) 40 MG tablet Take 1/2 (one-half) tablet by mouth twice daily 90 tablet 0    pioglitazone (ACTOS) 30 MG tablet Take 1/2 (one-half) tablet by mouth once daily 45 tablet 5    clobetasol (TEMOVATE) 0.05 % ointment Apply topically 2 times daily. 1 Tube 0    clotrimazole-betamethasone (LOTRISONE) 1-0.05 % cream Apply topically 2 times daily. 1 Tube 2    fexofenadine (ALLEGRA) 180 MG tablet Take 180 mg by mouth daily      latanoprost (XALATAN) 0.005 % ophthalmic solution       albuterol sulfate  (90 Base) MCG/ACT inhaler Inhale 2 puffs into the lungs every 6 hours as needed for Wheezing      montelukast (SINGULAIR) 10 MG tablet Take 10 mg by mouth nightly.  albuterol (PROVENTIL) (2.5 MG/3ML) 0.083% nebulizer solution Take 2.5 mg by nebulization every 6 hours as needed.          Current Facility-Administered Medications on File Prior to Visit   Medication Dose Route Frequency Provider Last Rate Last Dose    methylPREDNISolone acetate (DEPO-MEDROL) for sinusitis      Please note that the above documentation was prepared using voice recognition software. Every attempt was made to ensure accuracy but there may be spelling, grammatical, and contextual errors. Electronically signed by Pa Hilliard DO on 11/19/2020 at 10:55 AM        No follow-ups on file.     Pa Hilliard DO

## 2021-01-18 ENCOUNTER — OFFICE VISIT (OUTPATIENT)
Dept: PRIMARY CARE CLINIC | Age: 56
End: 2021-01-18
Payer: COMMERCIAL

## 2021-01-18 VITALS
WEIGHT: 315 LBS | DIASTOLIC BLOOD PRESSURE: 64 MMHG | TEMPERATURE: 97.5 F | SYSTOLIC BLOOD PRESSURE: 120 MMHG | BODY MASS INDEX: 55.41 KG/M2 | OXYGEN SATURATION: 95 % | HEART RATE: 73 BPM

## 2021-01-18 DIAGNOSIS — M54.89 MIDLINE BACK PAIN, UNSPECIFIED BACK LOCATION, UNSPECIFIED CHRONICITY: Primary | ICD-10-CM

## 2021-01-18 DIAGNOSIS — R35.0 URINARY FREQUENCY: ICD-10-CM

## 2021-01-18 LAB
BILIRUBIN, POC: NORMAL
BLOOD URINE, POC: NORMAL
CLARITY, POC: CLEAR
COLOR, POC: YELLOW
GLUCOSE URINE, POC: NORMAL
KETONES, POC: NORMAL
LEUKOCYTE EST, POC: NORMAL
NITRITE, POC: NORMAL
PH, POC: 5.5
PROTEIN, POC: NORMAL
SPECIFIC GRAVITY, POC: 1.02
UROBILINOGEN, POC: 0.2

## 2021-01-18 PROCEDURE — 3017F COLORECTAL CA SCREEN DOC REV: CPT | Performed by: INTERNAL MEDICINE

## 2021-01-18 PROCEDURE — 81002 URINALYSIS NONAUTO W/O SCOPE: CPT | Performed by: INTERNAL MEDICINE

## 2021-01-18 PROCEDURE — G8482 FLU IMMUNIZE ORDER/ADMIN: HCPCS | Performed by: INTERNAL MEDICINE

## 2021-01-18 PROCEDURE — G8417 CALC BMI ABV UP PARAM F/U: HCPCS | Performed by: INTERNAL MEDICINE

## 2021-01-18 PROCEDURE — 1036F TOBACCO NON-USER: CPT | Performed by: INTERNAL MEDICINE

## 2021-01-18 PROCEDURE — G8427 DOCREV CUR MEDS BY ELIG CLIN: HCPCS | Performed by: INTERNAL MEDICINE

## 2021-01-18 PROCEDURE — 99213 OFFICE O/P EST LOW 20 MIN: CPT | Performed by: INTERNAL MEDICINE

## 2021-01-18 ASSESSMENT — PATIENT HEALTH QUESTIONNAIRE - PHQ9
SUM OF ALL RESPONSES TO PHQ QUESTIONS 1-9: 0
SUM OF ALL RESPONSES TO PHQ QUESTIONS 1-9: 0
SUM OF ALL RESPONSES TO PHQ9 QUESTIONS 1 & 2: 0
1. LITTLE INTEREST OR PLEASURE IN DOING THINGS: 0

## 2021-01-18 NOTE — PATIENT INSTRUCTIONS
Right low back  History of back pain  He was told that he had a collapse disc - this was several years ago  This improved on its own  His pain is more constant lately  There is nothing that makes it better or worse  No urinary or bowels

## 2021-01-18 NOTE — PROGRESS NOTES
1/18/21    Lidia Barnes, a male of 54 y.o. came to the office     Lidia Barnes presents today for evaluation of back pain. He has a 5-year history of back pain which was diagnosed as a disc issue. The pain always is on the right side of his body and is worsened with movement. His pain has not changed other than the fact that is more constant now    Patient Active Problem List   Diagnosis    Chest pain    Type 2 diabetes mellitus without complication (HCC)    Class 3 obesity due to excess calories without serious comorbidity with body mass index (BMI) of 50.0 to 59.9 in adult    Secondary hypertension    Allergies    Acute non-recurrent maxillary sinusitis      Allergies   Allergen Reactions    Biaxin [Clarithromycin]      Racing heart      Ciprofloxacin     Levofloxacin Other (See Comments)     pain    Penicillins Hives    Sulfamethoxazole-Trimethoprim      Current Outpatient Medications on File Prior to Visit   Medication Sig Dispense Refill    ciclopirox (LOPROX) 0.77 % cream       lisinopril (PRINIVIL;ZESTRIL) 40 MG tablet Take 1/2 (one-half) tablet by mouth twice daily 90 tablet 0    pioglitazone (ACTOS) 30 MG tablet Take 1/2 (one-half) tablet by mouth once daily 45 tablet 5    clobetasol (TEMOVATE) 0.05 % ointment Apply topically 2 times daily. 1 Tube 0    clotrimazole-betamethasone (LOTRISONE) 1-0.05 % cream Apply topically 2 times daily. 1 Tube 2    fexofenadine (ALLEGRA) 180 MG tablet Take 180 mg by mouth daily      latanoprost (XALATAN) 0.005 % ophthalmic solution       albuterol sulfate  (90 Base) MCG/ACT inhaler Inhale 2 puffs into the lungs every 6 hours as needed for Wheezing      montelukast (SINGULAIR) 10 MG tablet Take 10 mg by mouth nightly.  albuterol (PROVENTIL) (2.5 MG/3ML) 0.083% nebulizer solution Take 2.5 mg by nebulization every 6 hours as needed.          Current Facility-Administered Medications on File Prior to Visit   Medication Dose Route Frequency Provider Last Rate Last Admin    methylPREDNISolone acetate (DEPO-MEDROL) injection 80 mg  80 mg Intramuscular Once Fidel Jaimes DO         Review of Systems  Constitutional:Negative for activity change, appetite change, chills, fatigue and fever. Respiratory: Negative for choking, chest tightness, shortness of breath and wheezing. Cardiovascular: Negative for chest pain, palpitations and leg swelling. Gastrointestinal: Negative for abdominal distention, constipation, diarrhea, nausea and vomiting. Musculoskeletal: Negative for arthralgias, back pain, gait problem and joint swelling. Neurological: Negative for dizziness, weakness,numbness and headaches. /64   Pulse 73   Temp 97.5 °F (36.4 °C)   Wt (!) 420 lb (190.5 kg)   SpO2 95%   BMI 55.41 kg/m²      Physical Exam   Constitutional:  Oriented to person, place, and time. Appears well-developed and well-nourished. No acute distress. HENT: No sinus tenderness or lymphadenopathy  Head: Normocephalic and atraumatic. Eyes: Eyes exhibits no discharge. No scleral icterus present. Neck: No tracheal deviation present. No thyromegaly present. Cardiovascular: Normal rate, regular rhythm, normal heart sounds and intact distal pulses. Exam reveals no gallop nor friction rub. No murmur heard. Pulmonary: Effort normal and breath sounds normal. No respiratory distress. No wheezes or rales. Musculoskeletal:  No tenderness to palpation  Neurological:Alert and oriented to person, place, and time. Skin: No diaphoresis. Psychiatric: Normal mood and affect. Behavior is Normal.     ASSESSMENT AND PLAN:    Gilbert Fowler was seen today for back pain.     Diagnoses and all orders for this visit:    Midline back pain, unspecified back location, unspecified chronicity    Urinary frequency  -     POCT Urinalysis no Micro        Acute on chronic right-sided low back pain likely secondary to paravertebral muscle spasm    I offered home stretches physical therapy and muscle relaxers which she declined all    I will perform urinalysis given the fact that he is having increased urinary frequency  Please note that the above documentation was prepared using voice recognition software. Every attempt was made to ensure accuracy but there may be spelling, grammatical, and contextual errors. Electronically signed by Jian Mac DO on 1/18/2021 at 5:00 PM        Return if symptoms worsen or fail to improve.     Jian Mac DO

## 2021-03-29 ENCOUNTER — OFFICE VISIT (OUTPATIENT)
Dept: PRIMARY CARE CLINIC | Age: 56
End: 2021-03-29
Payer: COMMERCIAL

## 2021-03-29 VITALS
SYSTOLIC BLOOD PRESSURE: 120 MMHG | TEMPERATURE: 96.4 F | HEART RATE: 98 BPM | WEIGHT: 315 LBS | DIASTOLIC BLOOD PRESSURE: 76 MMHG | OXYGEN SATURATION: 95 % | BODY MASS INDEX: 41.75 KG/M2 | HEIGHT: 73 IN

## 2021-03-29 DIAGNOSIS — J32.9 CHRONIC SINUSITIS, UNSPECIFIED LOCATION: ICD-10-CM

## 2021-03-29 DIAGNOSIS — R13.10 DYSPHAGIA, UNSPECIFIED TYPE: Primary | ICD-10-CM

## 2021-03-29 PROCEDURE — G8427 DOCREV CUR MEDS BY ELIG CLIN: HCPCS | Performed by: INTERNAL MEDICINE

## 2021-03-29 PROCEDURE — 99213 OFFICE O/P EST LOW 20 MIN: CPT | Performed by: INTERNAL MEDICINE

## 2021-03-29 PROCEDURE — G8417 CALC BMI ABV UP PARAM F/U: HCPCS | Performed by: INTERNAL MEDICINE

## 2021-03-29 PROCEDURE — 3017F COLORECTAL CA SCREEN DOC REV: CPT | Performed by: INTERNAL MEDICINE

## 2021-03-29 PROCEDURE — 1036F TOBACCO NON-USER: CPT | Performed by: INTERNAL MEDICINE

## 2021-03-29 PROCEDURE — G8482 FLU IMMUNIZE ORDER/ADMIN: HCPCS | Performed by: INTERNAL MEDICINE

## 2021-03-29 NOTE — PROGRESS NOTES
3/29/21    Michael Sheriff, a male of 54 y.o. came to the office     Michael Sheriff presents today for difficulty swallowing. He has a hard time swallowing food. He denies any sinus issues. He does not have any issues swallowing liquids. His food is getting caught in the oropharynx. He has had issues for the past week. He has had some lightheadedness. He denies any heartburn or regurgitation. Patient Active Problem List   Diagnosis    Chest pain    Type 2 diabetes mellitus without complication ([facility])    Class 3 obesity due to excess calories without serious comorbidity with body mass index (BMI) of 50.0 to 59.9 in adult    Secondary hypertension    Allergies    Acute non-recurrent maxillary sinusitis      Allergies   Allergen Reactions    Biaxin [Clarithromycin]      Racing heart      Ciprofloxacin     Levofloxacin Other (See Comments)     pain    Penicillins Hives    Sulfamethoxazole-Trimethoprim      Current Outpatient Medications on File Prior to Visit   Medication Sig Dispense Refill    lisinopril (PRINIVIL;ZESTRIL) 40 MG tablet Take 1/2 (one-half) tablet by mouth twice daily 90 tablet 0    pioglitazone (ACTOS) 30 MG tablet Take 1/2 (one-half) tablet by mouth once daily 45 tablet 5    clotrimazole-betamethasone (LOTRISONE) 1-0.05 % cream Apply topically 2 times daily. 1 Tube 2    fexofenadine (ALLEGRA) 180 MG tablet Take 180 mg by mouth daily      montelukast (SINGULAIR) 10 MG tablet Take 10 mg by mouth nightly.  albuterol (PROVENTIL) (2.5 MG/3ML) 0.083% nebulizer solution Take 2.5 mg by nebulization every 6 hours as needed.  ciclopirox (LOPROX) 0.77 % cream       clobetasol (TEMOVATE) 0.05 % ointment Apply topically 2 times daily.  (Patient not taking: Reported on 3/29/2021) 1 Tube 0    latanoprost (XALATAN) 0.005 % ophthalmic solution       albuterol sulfate  (90 Base) MCG/ACT inhaler Inhale 2 puffs into the lungs every 6 hours as needed for Wheezing       Current Facility-Administered Medications on File Prior to Visit   Medication Dose Route Frequency Provider Last Rate Last Admin    methylPREDNISolone acetate (DEPO-MEDROL) injection 80 mg  80 mg Intramuscular Once Donny Manning DO         Review of Systems  Constitutional:Negative for activity change, appetite change, chills, fatigue and fever. Respiratory: Negative for choking, chest tightness, shortness of breath and wheezing. Cardiovascular: Negative for chest pain, palpitations and leg swelling. Gastrointestinal: Negative for abdominal distention, constipation, diarrhea, nausea and vomiting. Musculoskeletal: Negative for arthralgias, back pain, gait problem and joint swelling. Neurological: Negative for dizziness, weakness,numbness and headaches. /76   Pulse 98   Temp 96.4 °F (35.8 °C)   Ht 6' 1\" (1.854 m)   Wt (!) 408 lb (185.1 kg)   SpO2 95%   BMI 53.83 kg/m²      Physical Exam   Constitutional:  Obese  HENT: Nasal turbinate edema  Head: Normocephalic and atraumatic. Eyes: Eyes exhibits no discharge. No scleral icterus present. Neck: No tracheal deviation present. No thyromegaly present. Cardiovascular: Normal rate, regular rhythm, normal heart sounds and intact distal pulses. Exam reveals no gallop nor friction rub. No murmur heard. Pulmonary: Effort normal and breath sounds normal. No respiratory distress. No wheezes or rales. Abdomen: No signs of rigidity rebound or organomegaly  Musculoskeletal:  No tenderness to palpation  Neurological:Alert and oriented to person, place, and time. Skin: No diaphoresis. Psychiatric: Normal mood and affect. Behavior is Normal.     ASSESSMENT AND PLAN:    Venkatesh Coon was seen today for choking.     Diagnoses and all orders for this visit:    Dysphagia, unspecified type  -     Kasia Coleman DO, [place] ([facility])    Chronic sinusitis, unspecified location  -     Elenita Lewis MD, Otol[facility]ology [place] ([facility])          Venkatesh Coon presents today for solid food dysphagia of less than 1 week duration    We discussed the need for endoscopic evaluation, I will refer him to gastroenterology    He denies any weight loss fevers chills or night sweats    I will also send him to second opinion for his chronic sinusitis. Electronically signed by Jessie Kam DO on 3/29/2021 at 5:14 PM    Return in about 6 months (around 9/29/2021).     Jessie Kam DO

## 2021-04-05 DIAGNOSIS — I15.9 SECONDARY HYPERTENSION: ICD-10-CM

## 2021-04-05 RX ORDER — LISINOPRIL 40 MG/1
TABLET ORAL
Qty: 90 TABLET | Refills: 1 | Status: SHIPPED
Start: 2021-04-05 | End: 2021-04-23 | Stop reason: SDUPTHER

## 2021-04-06 ENCOUNTER — OFFICE VISIT (OUTPATIENT)
Dept: ENT CLINIC | Age: 56
End: 2021-04-06
Payer: COMMERCIAL

## 2021-04-06 VITALS
BODY MASS INDEX: 53.43 KG/M2 | HEART RATE: 83 BPM | SYSTOLIC BLOOD PRESSURE: 135 MMHG | WEIGHT: 315 LBS | DIASTOLIC BLOOD PRESSURE: 84 MMHG

## 2021-04-06 DIAGNOSIS — R09.82 POST-NASAL DRAINAGE: ICD-10-CM

## 2021-04-06 DIAGNOSIS — J30.9 ALLERGIC RHINITIS, UNSPECIFIED SEASONALITY, UNSPECIFIED TRIGGER: Primary | ICD-10-CM

## 2021-04-06 PROCEDURE — G8427 DOCREV CUR MEDS BY ELIG CLIN: HCPCS | Performed by: NURSE PRACTITIONER

## 2021-04-06 PROCEDURE — 3017F COLORECTAL CA SCREEN DOC REV: CPT | Performed by: NURSE PRACTITIONER

## 2021-04-06 PROCEDURE — G8417 CALC BMI ABV UP PARAM F/U: HCPCS | Performed by: NURSE PRACTITIONER

## 2021-04-06 PROCEDURE — 1036F TOBACCO NON-USER: CPT | Performed by: NURSE PRACTITIONER

## 2021-04-06 PROCEDURE — 99213 OFFICE O/P EST LOW 20 MIN: CPT | Performed by: NURSE PRACTITIONER

## 2021-04-06 RX ORDER — PREDNISONE 1 MG/1
5 TABLET ORAL DAILY
Qty: 3 TABLET | Refills: 0 | Status: SHIPPED | OUTPATIENT
Start: 2021-04-06 | End: 2021-04-09

## 2021-04-06 RX ORDER — PREDNISONE 10 MG/1
10 TABLET ORAL DAILY
Qty: 5 TABLET | Refills: 0 | Status: SHIPPED | OUTPATIENT
Start: 2021-04-06 | End: 2021-04-11

## 2021-04-06 ASSESSMENT — ENCOUNTER SYMPTOMS
SHORTNESS OF BREATH: 0
SINUS PRESSURE: 1
STRIDOR: 0
RESPIRATORY NEGATIVE: 1
RHINORRHEA: 1
EYES NEGATIVE: 1
SINUS PAIN: 0

## 2021-04-06 NOTE — PROGRESS NOTES
[id number] [facility] Otolaryngology  Dr. Charisse Fraser. Marleny Hutton. Ms.Ed        Patient Name:  Ray Arrieta  :  1965     CHIEF C/O:    Chief Complaint   Patient presents with    Follow-up     full head and ears, itchy throat difficulty swallowing     Other     vertigo        HISTORY OBTAINED FROM:  patient    HISTORY OF PRESENT ILLNESS:       Chasidy Alexis is a 54y.o. year old male, here today for follow up of sinus problems and vertigo. He states he has a significant history of allergy symptoms for several years. He states 2 weeks ago he was exposed to a strong high  causing an increase in his congestion postnasal drainage, and rhinorrhea. He states after the exposure he experiences significant increase in postnasal drainage with sore and scratchy throat. He states his throat feels irritated and full when trying to swallow. He did complain of increased sinus pressure at the onset which has resolved considerably in the past 2 weeks. He is currently on Singulair and Allegra for his allergy symptoms. He is currently taking no nasal saline or other medicated nasal sprays. Does use a Vicks Synex nasal inhaler for intermittent relief. He also states that with the increased congestion and pressure he notices a mild off-balance sensation especially when changing positions or bending forward. He denies any falls. He denies any aches, numbness, or tingling. He states he has had similar episodes in the past and it is always related to worsened sinus symptoms.             Past Medical History:   Diagnosis Date    Asthma     Diabetes mellitus ([address]     Hypertension      Past Surgical History:   Procedure Laterality Date    APPENDECTOMY      CYST REMOVAL      FOOT SURGERY      TONSILLECTOMY         Current Outpatient Medications:     lisinopril (PRINIVIL;ZESTRIL) 40 MG tablet, Take 1/2 (one-half) tablet by mouth twice daily, Disp: 90 tablet, Rfl: 1    ciclopirox (LOPROX) 0.77 % cream, , Disp: , Rfl:    pioglitazone (ACTOS) 30 MG tablet, Take 1/2 (one-half) tablet by mouth once daily, Disp: 45 tablet, Rfl: 5    clobetasol (TEMOVATE) 0.05 % ointment, Apply topically 2 times daily. (Patient not taking: Reported on 3/29/2021), Disp: 1 Tube, Rfl: 0    clotrimazole-betamethasone (LOTRISONE) 1-0.05 % cream, Apply topically 2 times daily. , Disp: 1 Tube, Rfl: 2    fexofenadine (ALLEGRA) 180 MG tablet, Take 180 mg by mouth daily, Disp: , Rfl:     latanoprost (XALATAN) 0.005 % ophthalmic solution, , Disp: , Rfl:     albuterol sulfate  (90 Base) MCG/ACT inhaler, Inhale 2 puffs into the lungs every 6 hours as needed for Wheezing, Disp: , Rfl:     montelukast (SINGULAIR) 10 MG tablet, Take 10 mg by mouth nightly., Disp: , Rfl:     albuterol (PROVENTIL) (2.5 MG/3ML) 0.083% nebulizer solution, Take 2.5 mg by nebulization every 6 hours as needed. , Disp: , Rfl:   Biaxin [clarithromycin], Ciprofloxacin, Levofloxacin, Penicillins, and Sulfamethoxazole-trimethoprim  Social History     Tobacco Use    Smoking status: Former Smoker     Packs/day: 2.00     Years: 17.00     Pack years: 34.00     Types: Cigarettes     Start date:      Quit date: 1999     Years since quittin.3    Smokeless tobacco: Never Used   Substance Use Topics    Alcohol use: No    Drug use: Not Currently     Family History   Problem Relation Age of Onset    Cancer Mother     Diabetes Mother        Review of Systems   Constitutional: Negative. Negative for activity change and appetite change. HENT: Positive for congestion, postnasal drip, rhinorrhea and sinus pressure. Negative for sinus pain. Eyes: Negative. Respiratory: Negative. Negative for shortness of breath and stridor. Cardiovascular: Negative. Negative for chest pain and palpitations. Endocrine: Negative. Musculoskeletal: Negative. Skin: Negative. Neurological: Positive for dizziness (Off-balance). Hematological: Negative.     Psychiatric/Behavioral: Negative. /84   Pulse 83   Wt (!) 405 lb (183.7 kg)   BMI 53.43 kg/m²   Physical Exam  Constitutional:       Appearance: He is obese. HENT:      Head: Normocephalic. Right Ear: Tympanic membrane, ear canal and external ear normal.      Left Ear: Tympanic membrane, ear canal and external ear normal.      Nose: Nose normal. No rhinorrhea. Right Turbinates: Swollen and pale. Left Turbinates: Swollen and pale. Mouth/Throat:      Lips: Pink. Mouth: Mucous membranes are moist.     Eyes:      Conjunctiva/sclera: Conjunctivae normal.      Pupils: Pupils are equal, round, and reactive to light. Neck:      Musculoskeletal: Normal range of motion. No neck rigidity or muscular tenderness. Cardiovascular:      Rate and Rhythm: Normal rate and regular rhythm. Pulses: Normal pulses. Pulmonary:      Effort: Pulmonary effort is normal. No respiratory distress. Breath sounds: No stridor. Musculoskeletal: Normal range of motion. Skin:     General: Skin is warm and dry. Neurological:      General: No focal deficit present. Mental Status: He is alert and oriented to person, place, and time. Psychiatric:         Mood and Affect: Mood normal.         Behavior: Behavior normal.         Thought Content: Thought content normal.         Judgment: Judgment normal.         IMPRESSION/PLAN:    Chandan Combs was seen today for follow-up and other. Diagnoses and all orders for this visit:    Allergic rhinitis, unspecified seasonality, unspecified trigger    Post-nasal drainage    Other orders  -     predniSONE (DELTASONE) 10 MG tablet; Take 1 tablet by mouth daily for 5 days  -     predniSONE (DELTASONE) 5 MG tablet; Take 1 tablet by mouth daily for 3 days      Chandan Combs is seen today for complaint of increased symptoms of allergic rhinitis and postnasal drainage. Upon exam there is mild swelling and pallor appearance of the inferior turbinates bilaterally.   There is no rhinorrhea present with postnasal drainage observed in the oropharynx. There is mild erythematous appearance within the oropharynx with no exudate. At this time patient will be placed on Astelin spray, 2 sprays each nostril up to twice daily as needed for his symptoms. He is declining Flonase at this time as he does not like using medicated nasal sprays on a daily basis. He is also recommended to begin using nasal saline for hydration of the sinuses. Who also be placed on prednisone taper for erythema and fullness sensation in the throat. He will follow-up in 1 month. He is instructed to call with any new or worsening of symptoms prior to his next appointment.       Kimani Cullen, MSN, FNP-C  [facility], Nose and Thro[facility]    The information contained in this note has been dictated using drug and medical speech recognition software and may contain errors

## 2021-04-09 ENCOUNTER — HOSPITAL ENCOUNTER (OUTPATIENT)
Age: 56
Discharge: HOME | End: 2021-04-11
Payer: COMMERCIAL

## 2021-04-09 DIAGNOSIS — Z01.818 PREOP TESTING: ICD-10-CM

## 2021-04-09 PROCEDURE — U0005 INFEC AGEN DETEC AMPLI PROBE: HCPCS

## 2021-04-09 PROCEDURE — U0003 INFECTIOUS AGENT DETECTION BY NUCLEIC ACID (DNA OR RNA); SEVERE ACUTE RESPIRATORY SYNDROME CORONAVIRUS 2 (SARS-COV-2) (CORONAVIRUS DISEASE [COVID-19]), AMPLIFIED PROBE TECHNIQUE, MAKING USE OF HIGH THROUGHPUT TECHNOLOGIES AS DESCRIBED BY CMS-2020-01-R: HCPCS

## 2021-04-10 LAB
SARS-COV-2: NOT DETECTED
SOURCE: NORMAL

## 2021-04-13 RX ORDER — AZELASTINE 1 MG/ML
1 SPRAY, METERED NASAL 2 TIMES DAILY
COMMUNITY
End: 2021-11-09 | Stop reason: SDUPTHER

## 2021-04-13 RX ORDER — PREDNISONE 1 MG/1
TABLET ORAL DAILY
COMMUNITY
End: 2021-04-23 | Stop reason: ALTCHOICE

## 2021-04-13 NOTE — PROGRESS NOTES
Have you been tested for COVID  Yes           Have you been told you were positive for COVID Yes  Have you had any known exposure to someone that is positive for COVID No  Do you have a cough                   No              Do you have shortness of breath No                 Do you have a sore throat            No                Are you having chills                    No                Are you having muscle aches. No                    Please come to the hospital wearing a mask and have your significant other wear a mask as well. Both of you should check your temperature before leaving to come here,  if it is 100 or higher please call [phone] for instruction.

## 2021-04-13 NOTE — PROGRESS NOTES
[facility] PRE-ADMISSION TESTING INSTRUCTIONS    The Preadmission Testing patient is instructed accordingly using the following criteria (check applicable):    ARRIVAL INSTRUCTIONS:  [x] Parking the day of Surgery is located in the Main Entrance lot. Upon entering the door, make an immediate right to the surgery reception desk    [x] Bring photo ID and insurance card    [] Bring in a copy of Living will or Durable Power of  papers. [x] Please be sure to arrange transportation to and from the hospital    [x] Please arrange for someone to be with you the remainder of the day due to having anesthesia      GENERAL INSTRUCTIONS:    [x] Nothing by mouth after midnight, including gum, candy, mints or water    [x] You may brush your teeth, but do not swallow any water    [x] Take medications as instructed with 1-2 oz of water    [] Stop herbal supplements and vitamins 5 days prior to procedure    [x] Follow preop dosing of blood thinners per physician instructions    [] Do not take insulin or oral diabetic medications    [x] If diabetic and have low blood sugar or feel symptomatic, take 1-2oz apple juice or glucose tablets    [x] Bring inhalers day of surgery    [] Bring C-PAP/ Bi-Pap day of surgery    [] Bring urine specimen day of surgery    [x] Antibacterial Soap shower or bath AM of Surgery, no lotion, powders or creams to surgical site    [x] Follow bowel prep as instructed per surgeon    [] No tobacco products within 24 hours of surgery     [x] No alcohol or illegal drug use within 24 hours of surgery.     [x] Jewelry, body piercing's, eyeglasses, contact lenses and dentures are not permitted into surgery (bring cases)      [] Please do not wear any nail polish or make up on the day of surgery    [x] If not already done, you can expect a call from registration    [x] If surgeon requests a time change you will be notified the day prior to surgery    [] If you receive a survey after surgery we would greatly appreciate your comments    [] Parent/guardian of a minor must accompany their child and remain on the premises  the entire time they are under our care     [] Pediatric patients may bring favorite toy, blanket or comfort item with them    [] A caregiver or family member must remain with the patient during their stay if they are mentally handicapped, have dementia, disoriented or unable to use a call light or would be a safety concern if left unattended    [x] Please notify surgeon if you develop any illness between now and time of surgery (cold, cough, sore throat, fever, nausea, vomiting) or any signs of infections  including skin, wounds, and dental.    [] Other instructions    EDUCATIONAL MATERIALS PROVIDED:    [] PAT Preoperative Education Packet/Booklet     [] Medication List    [] Fluoroscopy Information Pamphlet    [] Transfusion bracelet applied with instructions    [] Joint replacement video reviewed    [] Shower with antibacterial soap and use CHG wipes provided the evening before surgery as instructed

## 2021-04-14 ENCOUNTER — ANESTHESIA EVENT (OUTPATIENT)
Dept: ENDOSCOPY | Age: 56
End: 2021-04-14
Payer: COMMERCIAL

## 2021-04-15 ENCOUNTER — HOSPITAL ENCOUNTER (OUTPATIENT)
Age: 56
Setting detail: OUTPATIENT SURGERY
Discharge: HOME | End: 2021-04-15
Attending: INTERNAL MEDICINE | Admitting: INTERNAL MEDICINE
Payer: COMMERCIAL

## 2021-04-15 ENCOUNTER — ANESTHESIA (OUTPATIENT)
Dept: ENDOSCOPY | Age: 56
End: 2021-04-15
Payer: COMMERCIAL

## 2021-04-15 VITALS — SYSTOLIC BLOOD PRESSURE: 129 MMHG | DIASTOLIC BLOOD PRESSURE: 60 MMHG | OXYGEN SATURATION: 96 %

## 2021-04-15 VITALS
WEIGHT: 315 LBS | HEIGHT: 73 IN | OXYGEN SATURATION: 97 % | TEMPERATURE: 98.3 F | DIASTOLIC BLOOD PRESSURE: 55 MMHG | RESPIRATION RATE: 16 BRPM | HEART RATE: 68 BPM | SYSTOLIC BLOOD PRESSURE: 114 MMHG | BODY MASS INDEX: 41.75 KG/M2

## 2021-04-15 DIAGNOSIS — Z01.818 PREOP TESTING: Primary | ICD-10-CM

## 2021-04-15 LAB — METER GLUCOSE: 83 MG/DL (ref 74–99)

## 2021-04-15 PROCEDURE — 3609017700 HC EGD DILATION GASTRIC/DUODENAL STRICTURE: Performed by: INTERNAL MEDICINE

## 2021-04-15 PROCEDURE — 2709999900 HC NON-CHARGEABLE SUPPLY: Performed by: INTERNAL MEDICINE

## 2021-04-15 PROCEDURE — 2500000003 HC RX 250 WO HCPCS: Performed by: NURSE ANESTHETIST, CERTIFIED REGISTERED

## 2021-04-15 PROCEDURE — 7100000011 HC PHASE II RECOVERY - ADDTL 15 MIN: Performed by: INTERNAL MEDICINE

## 2021-04-15 PROCEDURE — 3700000000 HC ANESTHESIA ATTENDED CARE: Performed by: INTERNAL MEDICINE

## 2021-04-15 PROCEDURE — 3700000001 HC ADD 15 MINUTES (ANESTHESIA): Performed by: INTERNAL MEDICINE

## 2021-04-15 PROCEDURE — 88305 TISSUE EXAM BY PATHOLOGIST: CPT

## 2021-04-15 PROCEDURE — 6360000002 HC RX W HCPCS: Performed by: NURSE ANESTHETIST, CERTIFIED REGISTERED

## 2021-04-15 PROCEDURE — 2580000003 HC RX 258: Performed by: NURSE ANESTHETIST, CERTIFIED REGISTERED

## 2021-04-15 PROCEDURE — 82962 GLUCOSE BLOOD TEST: CPT

## 2021-04-15 PROCEDURE — 3609010300 HC COLONOSCOPY W/BIOPSY SINGLE/MULTIPLE: Performed by: INTERNAL MEDICINE

## 2021-04-15 PROCEDURE — 7100000010 HC PHASE II RECOVERY - FIRST 15 MIN: Performed by: INTERNAL MEDICINE

## 2021-04-15 RX ORDER — LIDOCAINE HYDROCHLORIDE 20 MG/ML
INJECTION, SOLUTION INFILTRATION; PERINEURAL PRN
Status: DISCONTINUED | OUTPATIENT
Start: 2021-04-15 | End: 2021-04-15 | Stop reason: SDUPTHER

## 2021-04-15 RX ORDER — SODIUM CHLORIDE 9 MG/ML
25 INJECTION, SOLUTION INTRAVENOUS PRN
Status: DISCONTINUED | OUTPATIENT
Start: 2021-04-15 | End: 2021-04-15 | Stop reason: HOSPADM

## 2021-04-15 RX ORDER — SODIUM CHLORIDE 0.9 % (FLUSH) 0.9 %
5-40 SYRINGE (ML) INJECTION PRN
Status: DISCONTINUED | OUTPATIENT
Start: 2021-04-15 | End: 2021-04-15 | Stop reason: HOSPADM

## 2021-04-15 RX ORDER — SODIUM CHLORIDE 9 MG/ML
INJECTION, SOLUTION INTRAVENOUS CONTINUOUS PRN
Status: DISCONTINUED | OUTPATIENT
Start: 2021-04-15 | End: 2021-04-15 | Stop reason: SDUPTHER

## 2021-04-15 RX ORDER — PROPOFOL 10 MG/ML
INJECTION, EMULSION INTRAVENOUS PRN
Status: DISCONTINUED | OUTPATIENT
Start: 2021-04-15 | End: 2021-04-15 | Stop reason: SDUPTHER

## 2021-04-15 RX ORDER — GLYCOPYRROLATE 1 MG/5 ML
SYRINGE (ML) INTRAVENOUS PRN
Status: DISCONTINUED | OUTPATIENT
Start: 2021-04-15 | End: 2021-04-15 | Stop reason: SDUPTHER

## 2021-04-15 RX ORDER — SODIUM CHLORIDE 0.9 % (FLUSH) 0.9 %
5-40 SYRINGE (ML) INJECTION EVERY 12 HOURS SCHEDULED
Status: DISCONTINUED | OUTPATIENT
Start: 2021-04-15 | End: 2021-04-15 | Stop reason: HOSPADM

## 2021-04-15 RX ADMIN — PROPOFOL 50 MG: 10 INJECTION, EMULSION INTRAVENOUS at 15:07

## 2021-04-15 RX ADMIN — PROPOFOL 200 MG: 10 INJECTION, EMULSION INTRAVENOUS at 14:55

## 2021-04-15 RX ADMIN — SODIUM CHLORIDE: 9 INJECTION, SOLUTION INTRAVENOUS at 14:48

## 2021-04-15 RX ADMIN — Medication 0.2 MG: at 14:55

## 2021-04-15 RX ADMIN — PROPOFOL 50 MG: 10 INJECTION, EMULSION INTRAVENOUS at 15:17

## 2021-04-15 RX ADMIN — LIDOCAINE HYDROCHLORIDE 100 MG: 20 INJECTION, SOLUTION INFILTRATION; PERINEURAL at 14:55

## 2021-04-15 RX ADMIN — PROPOFOL 50 MG: 10 INJECTION, EMULSION INTRAVENOUS at 15:12

## 2021-04-15 RX ADMIN — PROPOFOL 50 MG: 10 INJECTION, EMULSION INTRAVENOUS at 15:24

## 2021-04-15 ASSESSMENT — PAIN - FUNCTIONAL ASSESSMENT: PAIN_FUNCTIONAL_ASSESSMENT: 0-10

## 2021-04-15 NOTE — H&P
[facility]    [facility]  Dr. Sarah Mays M.D., Dr. Tai Pantoja M.D., Armando Keating D.O.,   Kendell Damon M.D., Princess Ley D.O., and Dr. Vito Palafox M.D. Date:10:53 AM 4/15/2021    Dori Tarah  54 y.o.  male    53 yo M with Dysphagia to solid intermittently, has lot of PND with H/O of allergies. No outward bleeding. Pt over 39 and never had colonoscopy in past with need of CRC screening. Pt sees Dr. Dinesh Mcgrath for lung issues. Pt has h/o of reactions to anesthesia in the past with difficulty breathing and rashes, not sure which anesthetic it was. Pt with morbid obesity and I suspect LILIAM even though pt states not formally diagnosed. PmHx --HTN, DM2, Seasonal Allergies/PND, Obestiy/Metabolic syndrome    PsHx -- Tonsils, unspecific cyst, thinks appendix    All -- PCN, Levaquin, Cipro, Doxycycline, Z pack possible    Meds -- Lisinopril, Actos, Singulair, Allegra, Albuterol    FmHx -- Mom PUD, no known GI malignancy    SocHx -- Gerardo Etoh, smoking, or illicit substances    ROS -- NEG other than HPI     VSS per nursing notes    PE:  Ht 6' 1\" (1.854 m)   Wt (!) 405 lb (183.7 kg)   BMI 53.43 kg/m²     ASSESSMENT/PLAN:  1. Dysphagia    2. CRC Screening    3. Morbid Obesity and I suspect LILIAM    4. PT with H/O of reaction to anesthesia in the past with difficulty breathing and rash per patient. 5.  Plan for outpt EGD and Colonoscopy today. See dirk.         DO Stan  4/15/2021  10:53 AM

## 2021-04-15 NOTE — PROCEDURES
Eboni Hernandez is a 54 y.o. male patient. 1. Preop testing      Past Medical History:   Diagnosis Date    Asthma     Diabetes mellitus ([address]     Hypertension     Prolonged emergence from general anesthesia      Blood pressure 132/68, pulse 80, temperature 98.3 °F (36.8 °C), temperature source Temporal, resp. rate 16, height 6' 1\" (1.854 m), weight (!) 405 lb (183.7 kg), SpO2 96 %. Procedures    Procedure:  Esophagogastroduodenoscopy    Indication:  Dysphagia    Consent: Informed consent was obtained from the patient including and not limited to risk of perforation, aspiration of gastric contents or teeth, bleeding, infection, dental breakage, ileus, need for surgery, or worst case death. Sedation  MAC    Estimated Blood Loss -- < 5cc    Endoscope was advanced easily through mouth to second portion of duodenum      Oropharynx views are limited but grossly normal.    Esophagus:   Mucosa is normal other than LA A Reflux Esophagitis with biopsy. GEJ at ~42 cm. At the end of the procedure I did dilate with a 64 [place] with good dilation effect and no complications. Stomach:   Antrum and Gastric body with mild gastritis and biopsy. Retroflexed views showed normal fundus and cardia. Duodenum: Bulb is normal.    Second portion of duodenum is normal.  No fresh of old blood. Biopsy taken. IMPRESSION AND PLAN:     1. Esophagitis with biopsy and dilation with 64 [place] with good dilation effect and no complications. 2.  Mild gastritis with biopsy    3. Normal duodenum with biopsy    4. Do Protonix 40mg PO daily in am for 8 weeks, NPO 4 hours prior to supine, try for 20-30 lb wt loss. Recommend LILIAM testing. See colonoscopy report same day. Follow up as outpatient in office, call [phone] to schedule for appointment.       DO Stan  4/15/2021  3:35 PM

## 2021-04-15 NOTE — ANESTHESIA PRE PROCEDURE
times per day Cammy Thomas, DO        sodium chloride flush 0.9 % injection 5-40 mL  5-40 mL Intravenous PRN Cammy Thomas, DO        0.9 % sodium chloride infusion  25 mL Intravenous PRN Cammy Thomas, DO           Allergies: Allergies   Allergen Reactions    Biaxin [Clarithromycin]      Racing heart      Ciprofloxacin     Levofloxacin Other (See Comments)     pain    Penicillins Hives    Sulfamethoxazole-Trimethoprim      UPSET STOAMCH       Problem List:    Patient Active Problem List   Diagnosis Code    Chest pain R07.9    Type 2 diabetes mellitus without complication ([facility] H44.0    Class 3 obesity due to excess calories without serious comorbidity with body mass index (BMI) of 50.0 to 59.9 in adult [id number]    Secondary hypertension I15.9    Allergies T78.40XA    Acute non-recurrent maxillary sinusitis J01.00       Past Medical History:        Diagnosis Date    Asthma     Diabetes mellitus ([facility]     Hypertension     Prolonged emergence from general anesthesia        Past Surgical History:        Procedure Laterality Date    APPENDECTOMY      CYST REMOVAL      FOOT SURGERY      TONSILLECTOMY         Social History:    Social History     Tobacco Use    Smoking status: Former Smoker     Packs/day: 2.00     Years: 17.00     Pack years: 34.00     Types: Cigarettes     Start date: 18     Quit date: 1999     Years since quittin.3    Smokeless tobacco: Never Used   Substance Use Topics    Alcohol use:  No                                Counseling given: Not Answered      Vital Signs (Current):   Vitals:    21 1034 04/15/21 1338   BP:  132/68   Pulse:  80   Resp:  16   Temp:  98.3 °F (36.8 °C)   TempSrc:  Temporal   SpO2:  96%   Weight: (!) 405 lb (183.7 kg) (!) 405 lb (183.7 kg)   Height: 6' 1\" (1.854 m) 6' 1\" (1.854 m)                                              BP Readings from Last 3 Encounters:   04/15/21 132/68   21 135/84   21 120/76       NPO Status: Time of last liquid consumption: 2300                        Time of last solid consumption: 2300                        Date of last liquid consumption: 04/14/21                        Date of last solid food consumption: 04/13/21    BMI:   Wt Readings from Last 3 Encounters:   04/15/21 (!) 405 lb (183.7 kg)   04/06/21 (!) 405 lb (183.7 kg)   03/29/21 (!) 408 lb (185.1 kg)     Body mass index is 53.43 kg/m². CBC:   Lab Results   Component Value Date    WBC 6.7 10/23/2020    RBC 4.87 10/23/2020    HGB 13.7 10/23/2020    HCT 44.1 10/23/2020    MCV 90.6 10/23/2020    RDW 14.8 10/23/2020     10/23/2020       CMP:   Lab Results   Component Value Date     11/19/2020    K 4.4 11/19/2020    K 4.9 04/19/2019     11/19/2020    CO2 23 11/19/2020    BUN 11 11/19/2020    CREATININE 0.9 11/19/2020    GFRAA >60 11/19/2020    LABGLOM >60 11/19/2020    GLUCOSE 90 11/19/2020    GLUCOSE 156 08/20/2011    PROT 7.3 10/23/2020    CALCIUM 9.4 11/19/2020    BILITOT 0.4 10/23/2020    ALKPHOS 67 10/23/2020    AST 23 10/23/2020    ALT 24 10/23/2020       POC Tests: No results for input(s): POCGLU, POCNA, POCK, POCCL, POCBUN, POCHEMO, POCHCT in the last 72 hours.     Coags:   Lab Results   Component Value Date    PROTIME 13.6 02/07/2015    INR 1.3 02/07/2015       HCG (If Applicable): No results found for: PREGTESTUR, PREGSERUM, HCG, HCGQUANT     ABGs: No results found for: PHART, PO2ART, ZOE0RAX, NLP2EKX, BEART, D7GIWASF     Type & Screen (If Applicable):  No results found for: LABABO, LABRH    Drug/Infectious Status (If Applicable):  No results found for: HIV, HEPCAB    COVID-19 Screening (If Applicable):   Lab Results   Component Value Date    COVID19 Not Detected 04/09/2021           Anesthesia Evaluation  Patient summary reviewed no history of anesthetic complications:   Airway: Mallampati: III  TM distance: >3 FB   Neck ROM: full   Dental:      Comment: fillings    Pulmonary: breath sounds clear to auscultation  (+) asthma:                           ROS comment: Former Smoker   Cardiovascular:    (+) hypertension:,         Rhythm: regular  Rate: normal           Beta Blocker:  Not on Beta Blocker         Neuro/Psych:   Negative Neuro/Psych ROS              GI/Hepatic/Renal:   (+) bowel prep, morbid obesity          Endo/Other:    (+) DiabetesType II DM, , .                  ROS comment: Steroid dependent Abdominal:           Vascular: negative vascular ROS. Anesthesia Plan      MAC     ASA 3       Induction: intravenous. Anesthetic plan and risks discussed with patient. Plan discussed with CRNA.                   Hortencia Johnston MD   4/15/2021

## 2021-04-15 NOTE — PROCEDURES
Quiana Prince is a [address] [place] [address] y.o male patient. 1. Preop testing      Past Medical History:   Diagnosis Date    Asthma     Diabetes mellitus ([address]     Hypertension     Prolonged emergence from general anesthesia      Blood pressure 132/68, pulse 80, temperature 98.3 °F (36.8 °C), temperature source Temporal, resp. rate 16, height 6' 1\" (1.854 m), weight (!) 405 lb (183.7 kg), SpO2 96 %. Procedures    Colonoscopy note    Indication: screening colonoscopy, high anesthesia risk given prior reaction and BMI    Consent: Informed consent was obtained from the patient including and not limited to risk of perforation, bleeding, infection, dental breakage, ileus, need for surgery, or worst case death. Sedation  MAC    Estimated Blood Loss -- < 2cc    Endoscope was advanced through anus to cecum and terminal ileum. The ileocecal valve and appendiceal orifice were identified and pictures were taken. Preparation is good. Patient tolerated procedure well. Terminal ileum x5cm is normal  Cecum is normal  Ascending colon is normal   Transverse colon is normal  Descending colon is normal  Sigmoid colon with mild diverticulosis, there was a 4mm polyp removed by forceps. Rectum direct views are normal  Retroflexion in rectum shows normal mucosa and dentate line grade 1-2 IH's. IMPRESSION AND PLAN:   1. Colon polyp x1 removed by forceps    2. Mild diverticulosis in sigmoid. 3.  Small IH/EH    4. Ok to D/C home when alert per hospital protocol. No driving x 24 hours. 5.  Follow up as outpatient in office, call [phone] to schedule for appointment in 3-4 weeks. Repeat screening colonoscopy in 5 yrs, earlier if alarm symptoms (pain, bleeding, weight loss, diarrhea or sudden onset constipation) occur.         DO Stan  4/15/2021

## 2021-04-23 ENCOUNTER — OFFICE VISIT (OUTPATIENT)
Dept: PRIMARY CARE CLINIC | Age: 56
End: 2021-04-23
Payer: COMMERCIAL

## 2021-04-23 VITALS
HEART RATE: 81 BPM | TEMPERATURE: 97 F | HEIGHT: 73 IN | OXYGEN SATURATION: 98 % | DIASTOLIC BLOOD PRESSURE: 76 MMHG | WEIGHT: 315 LBS | SYSTOLIC BLOOD PRESSURE: 122 MMHG | BODY MASS INDEX: 41.75 KG/M2

## 2021-04-23 DIAGNOSIS — R35.0 URINARY FREQUENCY: ICD-10-CM

## 2021-04-23 DIAGNOSIS — I15.9 SECONDARY HYPERTENSION: ICD-10-CM

## 2021-04-23 DIAGNOSIS — R13.10 DYSPHAGIA, UNSPECIFIED TYPE: Primary | ICD-10-CM

## 2021-04-23 DIAGNOSIS — E11.9 TYPE 2 DIABETES MELLITUS WITHOUT COMPLICATION, WITHOUT LONG-TERM CURRENT USE OF INSULIN (HCC): ICD-10-CM

## 2021-04-23 LAB
ALBUMIN SERPL-MCNC: 3.9 G/DL (ref 3.5–5.2)
ALP BLD-CCNC: 71 U/L (ref 40–129)
ALT SERPL-CCNC: 18 U/L (ref 0–40)
ANION GAP SERPL CALCULATED.3IONS-SCNC: 14 MMOL/L (ref 7–16)
AST SERPL-CCNC: 14 U/L (ref 0–39)
BILIRUB SERPL-MCNC: 0.4 MG/DL (ref 0–1.2)
BUN BLDV-MCNC: 12 MG/DL (ref 6–20)
CALCIUM SERPL-MCNC: 9 MG/DL (ref 8.6–10.2)
CHLORIDE BLD-SCNC: 101 MMOL/L (ref 98–107)
CHOLESTEROL, TOTAL: 172 MG/DL (ref 0–199)
CO2: 24 MMOL/L (ref 22–29)
CREAT SERPL-MCNC: 0.8 MG/DL (ref 0.7–1.2)
GFR AFRICAN AMERICAN: >60
GFR NON-AFRICAN AMERICAN: >60 ML/MIN/1.73
GLUCOSE BLD-MCNC: 88 MG/DL (ref 74–99)
HBA1C MFR BLD: 5.9 % (ref 4–5.6)
HCT VFR BLD CALC: 45 % (ref 37–54)
HDLC SERPL-MCNC: 30 MG/DL
HEMOGLOBIN: 14.2 G/DL (ref 12.5–16.5)
LDL CHOLESTEROL CALCULATED: 105 MG/DL (ref 0–99)
MCH RBC QN AUTO: 28.3 PG (ref 26–35)
MCHC RBC AUTO-ENTMCNC: 31.6 % (ref 32–34.5)
MCV RBC AUTO: 89.8 FL (ref 80–99.9)
PDW BLD-RTO: 15.2 FL (ref 11.5–15)
PLATELET # BLD: 264 E9/L (ref 130–450)
PMV BLD AUTO: 9.9 FL (ref 7–12)
POTASSIUM SERPL-SCNC: 4.7 MMOL/L (ref 3.5–5)
RBC # BLD: 5.01 E12/L (ref 3.8–5.8)
SODIUM BLD-SCNC: 139 MMOL/L (ref 132–146)
TOTAL PROTEIN: 7.1 G/DL (ref 6.4–8.3)
TRIGL SERPL-MCNC: 184 MG/DL (ref 0–149)
VLDLC SERPL CALC-MCNC: 37 MG/DL
WBC # BLD: 7 E9/L (ref 4.5–11.5)

## 2021-04-23 PROCEDURE — 1036F TOBACCO NON-USER: CPT | Performed by: INTERNAL MEDICINE

## 2021-04-23 PROCEDURE — G8427 DOCREV CUR MEDS BY ELIG CLIN: HCPCS | Performed by: INTERNAL MEDICINE

## 2021-04-23 PROCEDURE — G8417 CALC BMI ABV UP PARAM F/U: HCPCS | Performed by: INTERNAL MEDICINE

## 2021-04-23 PROCEDURE — 2022F DILAT RTA XM EVC RTNOPTHY: CPT | Performed by: INTERNAL MEDICINE

## 2021-04-23 PROCEDURE — 99214 OFFICE O/P EST MOD 30 MIN: CPT | Performed by: INTERNAL MEDICINE

## 2021-04-23 PROCEDURE — 3017F COLORECTAL CA SCREEN DOC REV: CPT | Performed by: INTERNAL MEDICINE

## 2021-04-23 PROCEDURE — 3046F HEMOGLOBIN A1C LEVEL >9.0%: CPT | Performed by: INTERNAL MEDICINE

## 2021-04-23 RX ORDER — LISINOPRIL 40 MG/1
TABLET ORAL
Qty: 90 TABLET | Refills: 1 | Status: SHIPPED
Start: 2021-04-23 | End: 2021-10-22 | Stop reason: SDUPTHER

## 2021-04-23 RX ORDER — PANTOPRAZOLE SODIUM 40 MG/1
40 TABLET, DELAYED RELEASE ORAL DAILY
COMMUNITY
End: 2021-10-22

## 2021-04-23 RX ORDER — PIOGLITAZONEHYDROCHLORIDE 30 MG/1
TABLET ORAL
Qty: 45 TABLET | Refills: 5 | Status: SHIPPED
Start: 2021-04-23 | End: 2021-10-22 | Stop reason: SDUPTHER

## 2021-04-28 ENCOUNTER — IMMUNIZATION (OUTPATIENT)
Dept: PRIMARY CARE CLINIC | Age: 56
End: 2021-04-28
Payer: COMMERCIAL

## 2021-04-28 PROCEDURE — 0001A COVID-19, PFIZER VACCINE 30MCG/0.3ML DOSE: CPT | Performed by: NURSE PRACTITIONER

## 2021-04-28 PROCEDURE — 91300 COVID-19, PFIZER VACCINE 30MCG/0.3ML DOSE: CPT | Performed by: NURSE PRACTITIONER

## 2021-05-07 ENCOUNTER — OFFICE VISIT (OUTPATIENT)
Dept: ENT CLINIC | Age: 56
End: 2021-05-07
Payer: COMMERCIAL

## 2021-05-07 VITALS
WEIGHT: 315 LBS | SYSTOLIC BLOOD PRESSURE: 119 MMHG | DIASTOLIC BLOOD PRESSURE: 79 MMHG | BODY MASS INDEX: 53.43 KG/M2 | HEART RATE: 79 BPM

## 2021-05-07 DIAGNOSIS — J30.9 ALLERGIC RHINITIS, UNSPECIFIED SEASONALITY, UNSPECIFIED TRIGGER: Primary | ICD-10-CM

## 2021-05-07 DIAGNOSIS — R09.82 POST-NASAL DRAINAGE: ICD-10-CM

## 2021-05-07 PROCEDURE — G8427 DOCREV CUR MEDS BY ELIG CLIN: HCPCS | Performed by: NURSE PRACTITIONER

## 2021-05-07 PROCEDURE — G8417 CALC BMI ABV UP PARAM F/U: HCPCS | Performed by: NURSE PRACTITIONER

## 2021-05-07 PROCEDURE — 3017F COLORECTAL CA SCREEN DOC REV: CPT | Performed by: NURSE PRACTITIONER

## 2021-05-07 PROCEDURE — 1036F TOBACCO NON-USER: CPT | Performed by: NURSE PRACTITIONER

## 2021-05-07 PROCEDURE — 99213 OFFICE O/P EST LOW 20 MIN: CPT | Performed by: NURSE PRACTITIONER

## 2021-05-07 RX ORDER — METRONIDAZOLE 500 MG/1
TABLET ORAL
COMMUNITY
Start: 2021-05-01 | End: 2021-10-22

## 2021-05-07 RX ORDER — CLARITHROMYCIN 500 MG/1
TABLET, COATED ORAL
COMMUNITY
Start: 2021-05-01 | End: 2021-10-22

## 2021-05-07 NOTE — PROGRESS NOTES
45 tablet, Rfl: 5    lisinopril (PRINIVIL;ZESTRIL) 40 MG tablet, Take 1/2 (one-half) tablet by mouth twice daily, Disp: 90 tablet, Rfl: 1    azelastine (ASTELIN) 0.1 % nasal spray, 1 spray by Nasal route 2 times daily Use in each nostril as directed, Disp: , Rfl:     fexofenadine (ALLEGRA) 180 MG tablet, Take 180 mg by mouth daily, Disp: , Rfl:     albuterol sulfate  (90 Base) MCG/ACT inhaler, Inhale 2 puffs into the lungs every 6 hours as needed for Wheezing, Disp: , Rfl:     montelukast (SINGULAIR) 10 MG tablet, Take 10 mg by mouth nightly., Disp: , Rfl:     albuterol (PROVENTIL) (2.5 MG/3ML) 0.083% nebulizer solution, Take 2.5 mg by nebulization every 6 hours as needed. , Disp: , Rfl:   Ciprofloxacin, Levofloxacin, Penicillins, Sulfamethoxazole-trimethoprim, and Biaxin [clarithromycin]  Social History     Tobacco Use    Smoking status: Former Smoker     Packs/day: 2.00     Years: 17.00     Pack years: 34.00     Types: Cigarettes     Start date:      Quit date: 1999     Years since quittin.4    Smokeless tobacco: Never Used   Substance Use Topics    Alcohol use: No    Drug use: Not Currently     Family History   Problem Relation Age of Onset    Cancer Mother     Diabetes Mother        Review of Systems   Constitutional: Negative. Negative for activity change and appetite change. HENT: Negative for congestion, postnasal drip, rhinorrhea, sinus pressure and sinus pain. Eyes: Negative. Respiratory: Negative. Negative for shortness of breath and stridor. Cardiovascular: Negative. Negative for chest pain and palpitations. Endocrine: Negative. Musculoskeletal: Negative. Skin: Negative. Neurological: Negative. Negative for dizziness. Hematological: Negative. Psychiatric/Behavioral: Negative. /79   Pulse 79   Wt (!) 405 lb (183.7 kg)   BMI 53.43 kg/m²   Physical Exam  Constitutional:       Appearance: Normal appearance.    HENT:      Head: Normocephalic. Right Ear: Tympanic membrane, ear canal and external ear normal.      Left Ear: Tympanic membrane, ear canal and external ear normal.      Nose: No rhinorrhea. Right Turbinates: Pale. Left Turbinates: Pale. Mouth/Throat:      Lips: Pink. Mouth: Mucous membranes are moist.      Pharynx: Oropharynx is clear. Eyes:      Conjunctiva/sclera: Conjunctivae normal.      Pupils: Pupils are equal, round, and reactive to light. Cardiovascular:      Rate and Rhythm: Normal rate and regular rhythm. Pulses: Normal pulses. Pulmonary:      Effort: Pulmonary effort is normal. No respiratory distress. Breath sounds: No stridor. Musculoskeletal:         General: Normal range of motion. Cervical back: Normal range of motion. No rigidity. No muscular tenderness. Skin:     General: Skin is warm and dry. Neurological:      General: No focal deficit present. Mental Status: He is alert and oriented to person, place, and time. Psychiatric:         Mood and Affect: Mood normal.         Behavior: Behavior normal.         Thought Content: Thought content normal.         Judgment: Judgment normal.         IMPRESSION/PLAN:  Bird Arriaga was seen today for follow-up. Diagnoses and all orders for this visit:    Allergic rhinitis, unspecified seasonality, unspecified trigger    Post-nasal drainage        Mild pallor appearance of the inferior turbinates bilaterally with no further considerable swelling. There is no current rhinorrhea or postnasal drainage. TMs are normal bilaterally. At this time he will continue with his Astelin spray, 2 sprays each nostril up to twice daily as needed for congestion and postnasal drainage symptoms. He will also continue Singulair, 10 mg once daily. Follow-up in 6 months. Is instructed to call with any new or worsening symptoms prior to his next appoint.       Eddie Riley, MSN, FNP-C  [address] [facility] and [facility]    The information contained in this note has been dictated using drug and medical speech recognition software and may contain errors

## 2021-05-19 ENCOUNTER — IMMUNIZATION (OUTPATIENT)
Dept: PRIMARY CARE CLINIC | Age: 56
End: 2021-05-19
Payer: COMMERCIAL

## 2021-05-19 PROCEDURE — 91300 COVID-19, PFIZER VACCINE 30MCG/0.3ML DOSE: CPT | Performed by: NURSE PRACTITIONER

## 2021-05-19 PROCEDURE — 0002A COVID-19, PFIZER VACCINE 30MCG/0.3ML DOSE: CPT | Performed by: NURSE PRACTITIONER

## 2021-06-07 ASSESSMENT — ENCOUNTER SYMPTOMS
RHINORRHEA: 0
SINUS PAIN: 0
SINUS PRESSURE: 0
RESPIRATORY NEGATIVE: 1
STRIDOR: 0
EYES NEGATIVE: 1
SHORTNESS OF BREATH: 0

## 2021-08-10 ENCOUNTER — OFFICE VISIT (OUTPATIENT)
Dept: FAMILY MEDICINE CLINIC | Age: 56
End: 2021-08-10
Payer: COMMERCIAL

## 2021-08-10 VITALS
OXYGEN SATURATION: 98 % | WEIGHT: 315 LBS | SYSTOLIC BLOOD PRESSURE: 138 MMHG | HEART RATE: 77 BPM | BODY MASS INDEX: 53.17 KG/M2 | DIASTOLIC BLOOD PRESSURE: 60 MMHG | TEMPERATURE: 97.5 F

## 2021-08-10 DIAGNOSIS — J01.90 ACUTE NON-RECURRENT SINUSITIS, UNSPECIFIED LOCATION: Primary | ICD-10-CM

## 2021-08-10 DIAGNOSIS — H10.9 CONJUNCTIVITIS OF RIGHT EYE, UNSPECIFIED CONJUNCTIVITIS TYPE: ICD-10-CM

## 2021-08-10 DIAGNOSIS — R09.81 NASAL CONGESTION: ICD-10-CM

## 2021-08-10 DIAGNOSIS — H66.91 RIGHT OTITIS MEDIA, UNSPECIFIED OTITIS MEDIA TYPE: ICD-10-CM

## 2021-08-10 PROCEDURE — 1036F TOBACCO NON-USER: CPT | Performed by: PHYSICIAN ASSISTANT

## 2021-08-10 PROCEDURE — 3017F COLORECTAL CA SCREEN DOC REV: CPT | Performed by: PHYSICIAN ASSISTANT

## 2021-08-10 PROCEDURE — G8427 DOCREV CUR MEDS BY ELIG CLIN: HCPCS | Performed by: PHYSICIAN ASSISTANT

## 2021-08-10 PROCEDURE — G8417 CALC BMI ABV UP PARAM F/U: HCPCS | Performed by: PHYSICIAN ASSISTANT

## 2021-08-10 PROCEDURE — 99213 OFFICE O/P EST LOW 20 MIN: CPT | Performed by: PHYSICIAN ASSISTANT

## 2021-08-10 RX ORDER — CLINDAMYCIN HYDROCHLORIDE 300 MG/1
300 CAPSULE ORAL 3 TIMES DAILY
Qty: 40 CAPSULE | Refills: 0 | Status: SHIPPED | OUTPATIENT
Start: 2021-08-10 | End: 2021-08-20

## 2021-08-10 RX ORDER — TOBRAMYCIN 3 MG/ML
1 SOLUTION/ DROPS OPHTHALMIC EVERY 4 HOURS
Qty: 5 ML | Refills: 0 | Status: SHIPPED
Start: 2021-08-10 | End: 2022-08-10 | Stop reason: SDUPTHER

## 2021-08-10 NOTE — PROGRESS NOTES
Alhaji Contreras DO at [facility] ENDOSCOPY       Family History   Problem Relation Age of Onset    Cancer Mother     Diabetes Mother        Medications:     Current Outpatient Medications:     clindamycin (CLEOCIN) 300 MG capsule, Take 1 capsule by mouth 3 times daily for 10 days, Disp: 40 capsule, Rfl: 0    tobramycin (TOBREX) 0.3 % ophthalmic solution, Place 1 drop into both eyes every 4 hours for 10 days, Disp: 5 mL, Rfl: 0    clarithromycin (BIAXIN) 500 MG tablet, TAKE 1 TABLET BY MOUTH TWICE DAILY FOR 14 DAYS, Disp: , Rfl:     metroNIDAZOLE (FLAGYL) 500 MG tablet, TAKE 1 TABLET BY MOUTH THREE TIMES DAILY, Disp: , Rfl:     pantoprazole (PROTONIX) 40 MG tablet, Take 40 mg by mouth daily, Disp: , Rfl:     pioglitazone (ACTOS) 30 MG tablet, Take 1/2 (one-half) tablet by mouth once daily, Disp: 45 tablet, Rfl: 5    lisinopril (PRINIVIL;ZESTRIL) 40 MG tablet, Take 1/2 (one-half) tablet by mouth twice daily, Disp: 90 tablet, Rfl: 1    azelastine (ASTELIN) 0.1 % nasal spray, 1 spray by Nasal route 2 times daily Use in each nostril as directed, Disp: , Rfl:     fexofenadine (ALLEGRA) 180 MG tablet, Take 180 mg by mouth daily, Disp: , Rfl:     albuterol sulfate  (90 Base) MCG/ACT inhaler, Inhale 2 puffs into the lungs every 6 hours as needed for Wheezing, Disp: , Rfl:     montelukast (SINGULAIR) 10 MG tablet, Take 10 mg by mouth nightly., Disp: , Rfl:     albuterol (PROVENTIL) (2.5 MG/3ML) 0.083% nebulizer solution, Take 2.5 mg by nebulization every 6 hours as needed. , Disp: , Rfl:     Allergies:      Allergies   Allergen Reactions    Ciprofloxacin     Levofloxacin Other (See Comments)     pain    Penicillins Hives    Sulfamethoxazole-Trimethoprim      UPSET STOAMCH    Biaxin [Clarithromycin] Other (See Comments)     Racing heart         Social History:     Social History     Tobacco Use    Smoking status: Former Smoker     Packs/day: 2.00     Years: 17.00     Pack years: 34.00     Types: Cigarettes Start date: 18     Quit date: 1999     Years since quittin.7    Smokeless tobacco: Never Used   Vaping Use    Vaping Use: Never used   Substance Use Topics    Alcohol use: No    Drug use: Not Currently       Patient lives at home. Physical Exam:     Vitals:    08/10/21 1551   BP: 138/60   Pulse: 77   Temp: 97.5 °F (36.4 °C)   SpO2: 98%   Weight: (!) 403 lb (182.8 kg)       Exam:  Physical Exam  Nurse's notes and vital signs reviewed. The patient is not hypoxic. ? General: Alert, no acute distress, patient resting comfortably Patient is not toxic or lethargic. Skin: Warm, intact, no pallor noted. There is no evidence of rash at this time. Head: Normocephalic, atraumatic  Eye: Mild erythema noted to the right eye, there is evidence of some discharge noted in the medial canthus  Ears, Nose, Throat: Right tympanic membrane erythematous without bulging, left tympanic membrane clear. No drainage or discharge noted. No pre- or post-auricular tenderness, erythema, or swelling noted. Nasal congestion, rhinorrhea, no epistaxis  Posterior oropharynx shows erythema, PND but no evidence of tonsillar hypertrophy, or exudate. the uvula is midline. No trismus or drooling is noted. Moist mucous membranes. Neck: No anterior/posterior lymphadenopathy noted. No erythema, no masses, no fluctuance or induration noted. No meningeal signs. Cardiovascular: Regular Rate and Rhythm  Respiratory: No acute distress, no rhonchi, wheezing or crackles noted. No stridor or retractions are noted. Neurological: A&O x4, normal speech  Psychiatric: Cooperative             Testing:           Medical Decision Making:     Vital signs reviewed    Past medical history reviewed. Allergies reviewed. Medications reviewed. Patient on arrival does not appear to be in any apparent distress or discomfort. The patient has been seen and evaluated. The patient does not appear to be toxic or lethargic.   The patient states that he can only really take clindamycin. The patient will be treated with clindamycin and the patient will be given tobramycin ophthalmic solution. The patient is to follow-up with PCP. Call with any questions or concerns. Use probiotics. The patient was educated on the proper dosage of motrin and tylenol and the appropriate intervals of each. The patient is to increase fluid intake over the next several days. The patient is to use OTC decongestant as needed. The patient is to return to express care or go directly to the emergency department should any of the signs or symptoms worsen. The patient is to followup with primary care physician in 2-3 days for repeat evaluation. The patient has no other questions or concerns at this time the patient will be discharged home. Clinical Impression:   Claudetta Hough was seen today for otalgia, congestion and drainage. Diagnoses and all orders for this visit:    Acute non-recurrent sinusitis, unspecified location    Nasal congestion    Right otitis media, unspecified otitis media type    Conjunctivitis of right eye, unspecified conjunctivitis type    Other orders  -     clindamycin (CLEOCIN) 300 MG capsule; Take 1 capsule by mouth 3 times daily for 10 days  -     tobramycin (TOBREX) 0.3 % ophthalmic solution; Place 1 drop into both eyes every 4 hours for 10 days        The patient is to call for any concerns or return if any of the signs or symptoms worsen. The patient is to follow-up with PCP in the next 2-3 days for repeat evaluation repeat assessment or go directly to the emergency department.      SIGNATURE: Vic Newby III, PA-C

## 2021-09-28 ENCOUNTER — OFFICE VISIT (OUTPATIENT)
Dept: FAMILY MEDICINE CLINIC | Age: 56
End: 2021-09-28
Payer: COMMERCIAL

## 2021-09-28 VITALS
DIASTOLIC BLOOD PRESSURE: 84 MMHG | SYSTOLIC BLOOD PRESSURE: 132 MMHG | BODY MASS INDEX: 41.75 KG/M2 | HEIGHT: 73 IN | TEMPERATURE: 97.7 F | OXYGEN SATURATION: 100 % | HEART RATE: 82 BPM | WEIGHT: 315 LBS | RESPIRATION RATE: 18 BRPM

## 2021-09-28 DIAGNOSIS — L03.314 CELLULITIS OF RIGHT GROIN: ICD-10-CM

## 2021-09-28 DIAGNOSIS — L02.214 ABSCESS OF RIGHT GROIN: Primary | ICD-10-CM

## 2021-09-28 PROCEDURE — G8417 CALC BMI ABV UP PARAM F/U: HCPCS | Performed by: PHYSICIAN ASSISTANT

## 2021-09-28 PROCEDURE — 3017F COLORECTAL CA SCREEN DOC REV: CPT | Performed by: PHYSICIAN ASSISTANT

## 2021-09-28 PROCEDURE — G8427 DOCREV CUR MEDS BY ELIG CLIN: HCPCS | Performed by: PHYSICIAN ASSISTANT

## 2021-09-28 PROCEDURE — 99213 OFFICE O/P EST LOW 20 MIN: CPT | Performed by: PHYSICIAN ASSISTANT

## 2021-09-28 PROCEDURE — 1036F TOBACCO NON-USER: CPT | Performed by: PHYSICIAN ASSISTANT

## 2021-09-28 RX ORDER — CLINDAMYCIN HYDROCHLORIDE 300 MG/1
300 CAPSULE ORAL 4 TIMES DAILY
Qty: 40 CAPSULE | Refills: 0 | Status: SHIPPED | OUTPATIENT
Start: 2021-09-28 | End: 2021-10-08

## 2021-09-28 NOTE — PROGRESS NOTES
21  Ally Tyson : 1965 Sex: male  Age 54 y.o. Subjective:  Chief Complaint   Patient presents with    Rash     groin          HPI:   Ally Tyson , 54 y.o. male presents to express care for evaluation of rash in the right groin area    HPI  51-year-old male presents to [facility] for evaluation of rash to the right groin area. The patient has noted this rash ongoing for the last 1 to 2 days. There is a small pimple area and the patient was able to express some purulent material came out. He noted that there is now a reddened area. The patient has a history of cellulitis in the past.  The patient has tolerated clindamycin well in the past.  The patient does not any fever, chills. The patient is not having any other complaints currently        ROS:   Unless otherwise stated in this report the patient's positive and negative responses for review of systems for constitutional, eyes, ENT, cardiovascular, respiratory, gastrointestinal, neurological, , musculoskeletal, and integument systems and related systems to the presenting problem are either stated in the history of present illness or were not pertinent or were negative for the symptoms and/or complaints related to the presenting medical problem. Positives and pertinent negatives as per HPI. All others reviewed and are negative.       PMH:     Past Medical History:   Diagnosis Date    Asthma     Diabetes mellitus ([address]     Hypertension     Prolonged emergence from general anesthesia        Past Surgical History:   Procedure Laterality Date    APPENDECTOMY      COLONOSCOPY N/A 4/15/2021    COLONOSCOPY WITH BIOPSY performed by Joel Jo DO at [address] [facility]      FOOT SURGERY      TONSILLECTOMY      UPPER GASTROINTESTINAL ENDOSCOPY N/A 4/15/2021    EGD ESOPHAGOGASTRODUODENOSCOPY DILATATION performed by Joel Jo DO at [facility] ENDOSCOPY       Family History   Problem Relation Age of Onset    Cancer Mother  Diabetes Mother        Medications:     Current Outpatient Medications:     clindamycin (CLEOCIN) 300 MG capsule, Take 1 capsule by mouth 4 times daily for 10 days, Disp: 40 capsule, Rfl: 0    clarithromycin (BIAXIN) 500 MG tablet, TAKE 1 TABLET BY MOUTH TWICE DAILY FOR 14 DAYS, Disp: , Rfl:     metroNIDAZOLE (FLAGYL) 500 MG tablet, TAKE 1 TABLET BY MOUTH THREE TIMES DAILY, Disp: , Rfl:     pantoprazole (PROTONIX) 40 MG tablet, Take 40 mg by mouth daily, Disp: , Rfl:     pioglitazone (ACTOS) 30 MG tablet, Take 1/2 (one-half) tablet by mouth once daily, Disp: 45 tablet, Rfl: 5    lisinopril (PRINIVIL;ZESTRIL) 40 MG tablet, Take 1/2 (one-half) tablet by mouth twice daily, Disp: 90 tablet, Rfl: 1    azelastine (ASTELIN) 0.1 % nasal spray, 1 spray by Nasal route 2 times daily Use in each nostril as directed, Disp: , Rfl:     fexofenadine (ALLEGRA) 180 MG tablet, Take 180 mg by mouth daily, Disp: , Rfl:     albuterol sulfate  (90 Base) MCG/ACT inhaler, Inhale 2 puffs into the lungs every 6 hours as needed for Wheezing, Disp: , Rfl:     montelukast (SINGULAIR) 10 MG tablet, Take 10 mg by mouth nightly., Disp: , Rfl:     albuterol (PROVENTIL) (2.5 MG/3ML) 0.083% nebulizer solution, Take 2.5 mg by nebulization every 6 hours as needed. , Disp: , Rfl:     Allergies:      Allergies   Allergen Reactions    Ciprofloxacin     Levofloxacin Other (See Comments)     pain    Penicillins Hives    Sulfamethoxazole-Trimethoprim      UPSET STOAMCH    Biaxin [Clarithromycin] Other (See Comments)     Racing heart         Social History:     Social History     Tobacco Use    Smoking status: Former Smoker     Packs/day: 2.00     Years: 17.00     Pack years: 34.00     Types: Cigarettes     Start date:      Quit date: 1999     Years since quittin.8    Smokeless tobacco: Never Used   Vaping Use    Vaping Use: Never used   Substance Use Topics    Alcohol use: No    Drug use: Not Currently Patient lives at home. Physical Exam:     Vitals:    09/28/21 1638   BP: 132/84   Pulse: 82   Resp: 18   Temp: 97.7 °F (36.5 °C)   SpO2: 100%   Weight: (!) 403 lb (182.8 kg)   Height: 6' 1\" (1.854 m)       Exam:  Physical Exam  Nurses note and vital signs reviewed and patient is not hypoxic. General: The patient appears well and in no apparent distress. Patient is resting comfortably on cart. Skin: Warm, dry, no pallor noted. Erythema noted to the right groin area  Head: Normocephalic, atraumatic. Eye: Normal conjunctiva  Ears, Nose, Mouth, and Throat: Wearing a mask  Cardiovascular: Regular Rate and Rhythm  Respiratory: Patient is in no distress, no accessory muscle use, lungs are clear to auscultation, no wheezing, crackles or rhonchi  GI: Normal bowel sounds, no tenderness to palpation, no masses appreciated. No rebound, guarding, or rigidity noted. To the lower pelvic area just to the right of the suprapubic area the patient does have evidence of what appears to be an abscess that is about 1 cm in diameter. It is indurated. There is a central scabbing area. The patient has no evidence of gross cellulitis there is another area just to the medial aspect that is approximately 2 cm in diameter. This is erythematous. The patient has no lymphangitic streaking. Patient has no significant tenderness to the area. Neurological: A&O x4, normal speech      Testing:           Medical Decision Making:     Vital signs reviewed    Past medical history reviewed. Allergies reviewed. Medications reviewed. Patient on arrival does not appear to be in any apparent distress or discomfort. The patient has been seen and evaluated. The patient does not appear to be toxic or lethargic. The patient will be treated with clindamycin. The patient does have an abscess that has already been expressed. The area is indurated. The patient is to use warm compress.   The patient is to return if any of the signs or symptoms worsen. Follow-up with PCP for mandatory recheck and repeat evaluation in 2 to 3 days. The patient is to return to express care or go directly to the emergency department should any of the signs or symptoms worsen. The patient is to followup with primary care physician in 2-3 days for repeat evaluation. The patient has no other questions or concerns at this time the patient will be discharged home. Clinical Impression:   Alex Oconnor was seen today for rash. Diagnoses and all orders for this visit:    Abscess of right groin    Cellulitis of right groin    Other orders  -     clindamycin (CLEOCIN) 300 MG capsule; Take 1 capsule by mouth 4 times daily for 10 days        The patient is to call for any concerns or return if any of the signs or symptoms worsen. The patient is to follow-up with PCP in the next 2-3 days for repeat evaluation repeat assessment or go directly to the emergency department.      SIGNATURE: Flori Ramirez III, PA-C

## 2021-10-22 ENCOUNTER — OFFICE VISIT (OUTPATIENT)
Dept: PRIMARY CARE CLINIC | Age: 56
End: 2021-10-22
Payer: COMMERCIAL

## 2021-10-22 VITALS
DIASTOLIC BLOOD PRESSURE: 80 MMHG | BODY MASS INDEX: 55.02 KG/M2 | TEMPERATURE: 96.4 F | SYSTOLIC BLOOD PRESSURE: 128 MMHG | WEIGHT: 315 LBS | OXYGEN SATURATION: 98 % | HEART RATE: 80 BPM

## 2021-10-22 DIAGNOSIS — Z12.5 PROSTATE CANCER SCREENING: ICD-10-CM

## 2021-10-22 DIAGNOSIS — E11.9 TYPE 2 DIABETES MELLITUS WITHOUT COMPLICATION, WITHOUT LONG-TERM CURRENT USE OF INSULIN (HCC): ICD-10-CM

## 2021-10-22 DIAGNOSIS — I15.9 SECONDARY HYPERTENSION: ICD-10-CM

## 2021-10-22 DIAGNOSIS — R21 GROIN RASH: Primary | ICD-10-CM

## 2021-10-22 LAB
ALBUMIN SERPL-MCNC: 4.5 G/DL (ref 3.5–5.2)
ALP BLD-CCNC: 81 U/L (ref 40–129)
ALT SERPL-CCNC: 26 U/L (ref 0–40)
ANION GAP SERPL CALCULATED.3IONS-SCNC: 14 MMOL/L (ref 7–16)
AST SERPL-CCNC: 27 U/L (ref 0–39)
BILIRUB SERPL-MCNC: 0.5 MG/DL (ref 0–1.2)
BUN BLDV-MCNC: 14 MG/DL (ref 6–20)
CALCIUM SERPL-MCNC: 9.3 MG/DL (ref 8.6–10.2)
CHLORIDE BLD-SCNC: 102 MMOL/L (ref 98–107)
CHOLESTEROL, TOTAL: 192 MG/DL (ref 0–199)
CO2: 22 MMOL/L (ref 22–29)
CREAT SERPL-MCNC: 0.9 MG/DL (ref 0.7–1.2)
CREATININE URINE: 195 MG/DL (ref 40–278)
GFR AFRICAN AMERICAN: >60
GFR NON-AFRICAN AMERICAN: >60 ML/MIN/1.73
GLUCOSE BLD-MCNC: 104 MG/DL (ref 74–99)
HBA1C MFR BLD: 5.9 %
HCT VFR BLD CALC: 45.2 % (ref 37–54)
HDLC SERPL-MCNC: 38 MG/DL
HEMOGLOBIN: 14.1 G/DL (ref 12.5–16.5)
LDL CHOLESTEROL CALCULATED: 133 MG/DL (ref 0–99)
MCH RBC QN AUTO: 28 PG (ref 26–35)
MCHC RBC AUTO-ENTMCNC: 31.2 % (ref 32–34.5)
MCV RBC AUTO: 89.7 FL (ref 80–99.9)
MICROALBUMIN UR-MCNC: 13.5 MG/L
MICROALBUMIN/CREAT UR-RTO: 6.9 (ref 0–30)
PDW BLD-RTO: 15.2 FL (ref 11.5–15)
PLATELET # BLD: 190 E9/L (ref 130–450)
PMV BLD AUTO: 10.7 FL (ref 7–12)
POTASSIUM SERPL-SCNC: 4.6 MMOL/L (ref 3.5–5)
PROSTATE SPECIFIC ANTIGEN: 1.84 NG/ML (ref 0–4)
RBC # BLD: 5.04 E12/L (ref 3.8–5.8)
SODIUM BLD-SCNC: 138 MMOL/L (ref 132–146)
TOTAL PROTEIN: 6.9 G/DL (ref 6.4–8.3)
TRIGL SERPL-MCNC: 104 MG/DL (ref 0–149)
VLDLC SERPL CALC-MCNC: 21 MG/DL
WBC # BLD: 7.3 E9/L (ref 4.5–11.5)

## 2021-10-22 PROCEDURE — 99214 OFFICE O/P EST MOD 30 MIN: CPT | Performed by: INTERNAL MEDICINE

## 2021-10-22 PROCEDURE — 2022F DILAT RTA XM EVC RTNOPTHY: CPT | Performed by: INTERNAL MEDICINE

## 2021-10-22 PROCEDURE — G8427 DOCREV CUR MEDS BY ELIG CLIN: HCPCS | Performed by: INTERNAL MEDICINE

## 2021-10-22 PROCEDURE — 90471 IMMUNIZATION ADMIN: CPT | Performed by: INTERNAL MEDICINE

## 2021-10-22 PROCEDURE — 83036 HEMOGLOBIN GLYCOSYLATED A1C: CPT | Performed by: INTERNAL MEDICINE

## 2021-10-22 PROCEDURE — 3044F HG A1C LEVEL LT 7.0%: CPT | Performed by: INTERNAL MEDICINE

## 2021-10-22 PROCEDURE — 90674 CCIIV4 VAC NO PRSV 0.5 ML IM: CPT | Performed by: INTERNAL MEDICINE

## 2021-10-22 PROCEDURE — G8482 FLU IMMUNIZE ORDER/ADMIN: HCPCS | Performed by: INTERNAL MEDICINE

## 2021-10-22 PROCEDURE — G8417 CALC BMI ABV UP PARAM F/U: HCPCS | Performed by: INTERNAL MEDICINE

## 2021-10-22 PROCEDURE — 1036F TOBACCO NON-USER: CPT | Performed by: INTERNAL MEDICINE

## 2021-10-22 PROCEDURE — 3017F COLORECTAL CA SCREEN DOC REV: CPT | Performed by: INTERNAL MEDICINE

## 2021-10-22 RX ORDER — PIOGLITAZONEHYDROCHLORIDE 30 MG/1
TABLET ORAL
Qty: 45 TABLET | Refills: 5 | Status: SHIPPED
Start: 2021-10-22 | End: 2022-04-22 | Stop reason: SDUPTHER

## 2021-10-22 RX ORDER — CLARITHROMYCIN 500 MG/1
500 TABLET, COATED ORAL 2 TIMES DAILY
Qty: 20 TABLET | Refills: 0 | Status: SHIPPED | OUTPATIENT
Start: 2021-10-22 | End: 2021-11-01

## 2021-10-22 RX ORDER — LISINOPRIL 40 MG/1
TABLET ORAL
Qty: 90 TABLET | Refills: 1 | Status: SHIPPED
Start: 2021-10-22 | End: 2022-04-22 | Stop reason: SDUPTHER

## 2021-10-22 RX ORDER — CLOTRIMAZOLE AND BETAMETHASONE DIPROPIONATE 10; .64 MG/G; MG/G
CREAM TOPICAL
Qty: 45 G | Refills: 0 | Status: SHIPPED
Start: 2021-10-22 | End: 2022-04-22 | Stop reason: SDUPTHER

## 2021-10-22 RX ORDER — PANTOPRAZOLE SODIUM 20 MG/1
TABLET, DELAYED RELEASE ORAL
COMMUNITY
Start: 2021-09-15

## 2021-10-22 NOTE — PROGRESS NOTES
10/22/21    Kristin Macias, a male of 54 y.o. presents today for 6 Month Follow-Up    At last appointment, we discussed his dysphagia which improved with Protonix. Also his blood work was repeated. He was advised to follow-up with urology. We discussed the possibility of a trial of Flomax which he deferred. He has since followed up with otolaryngology. He was advised to continue Astelin spray as well as Singulair. He was also seen in urgent care on 2 occasions, the most recent of which was in September for a groin rash. This was treated with clindamycin. At last appointment a cholesterol panel was obtained which was elevated, at that time he deferred any changes to therapy. He has been having a cyst on his inner thigh. He has been bleeding out of his belly button. Patient Active Problem List   Diagnosis    Chest pain    Type 2 diabetes mellitus without complication (HCC)    Class 3 obesity due to excess calories without serious comorbidity with body mass index (BMI) of 50.0 to 59.9 in adult    Secondary hypertension    Allergies    Acute non-recurrent maxillary sinusitis      Allergies   Allergen Reactions    Ciprofloxacin     Levofloxacin Other (See Comments)     pain    Penicillins Hives    Sulfamethoxazole-Trimethoprim      UPSET STOAMCH    Biaxin [Clarithromycin] Other (See Comments)     Racing heart       Current Outpatient Medications on File Prior to Visit   Medication Sig Dispense Refill    pantoprazole (PROTONIX) 20 MG tablet TAKE 1 TABLET BY MOUTH ONCE DAILY      azelastine (ASTELIN) 0.1 % nasal spray 1 spray by Nasal route 2 times daily Use in each nostril as directed      fexofenadine (ALLEGRA) 180 MG tablet Take 180 mg by mouth daily      albuterol sulfate  (90 Base) MCG/ACT inhaler Inhale 2 puffs into the lungs every 6 hours as needed for Wheezing      montelukast (SINGULAIR) 10 MG tablet Take 10 mg by mouth nightly.       albuterol (PROVENTIL) (2.5 MG/3ML) 0.083% nebulizer solution Take 2.5 mg by nebulization every 6 hours as needed. Current Facility-Administered Medications on File Prior to Visit   Medication Dose Route Frequency Provider Last Rate Last Admin    methylPREDNISolone acetate (DEPO-MEDROL) injection 80 mg  80 mg IntraMUSCular Once Hollace Ing, DO         Review of Systems  Constitutional:Negative for activity change, appetite change, chills, fatigue and fever. Respiratory: Negative for choking, chest tightness, shortness of breath and wheezing. Cardiovascular: Negative for chest pain, palpitations and leg swelling. Gastrointestinal: Negative for abdominal distention, constipation, diarrhea, nausea and vomiting. Musculoskeletal: Negative for arthralgias, back pain, gait problem and joint swelling. Neurological: Negative for dizziness, weakness,numbness and headaches. /80   Pulse 80   Temp 96.4 °F (35.8 °C)   Wt (!) 417 lb (189.1 kg)   SpO2 98%   BMI 55.02 kg/m²      Physical Exam   Constitutional:  Oriented to person, place, and time. Appears well-developed and well-nourished. No acute distress. HENT: No sinus tenderness or lymphadenopathy  Head: Normocephalic and atraumatic. Eyes: Eyes exhibits no discharge. No scleral icterus present. Neck: No tracheal deviation present. No thyromegaly present. Cardiovascular: Normal rate, regular rhythm, normal heart sounds and intact distal pulses. Exam reveals no gallop nor friction rub. No murmur heard. Pulmonary: Effort normal and breath sounds normal. No respiratory distress. No wheezes or rales. Abdomen: No signs of rigidity rebound or organomegaly  Musculoskeletal:  No tenderness to palpation  Neurological:Alert and oriented to person, place, and time. Skin: No diaphoresis. Psychiatric: Normal mood and affect. Behavior is Normal.     ASSESSMENT AND PLAN:    Kanwal Siu was seen today for 6 month follow-up.     Diagnoses and all orders for this visit:    Groin rash  -     clotrimazole-betamethasone (LOTRISONE) 1-0.05 % cream; Apply topically 2 times daily. 45 g    Secondary hypertension  -     lisinopril (PRINIVIL;ZESTRIL) 40 MG tablet; Take 1/2 (one-half) tablet by mouth twice daily    Type 2 diabetes mellitus without complication, without long-term current use of insulin (HCC)  -     pioglitazone (ACTOS) 30 MG tablet; Take 1/2 (one-half) tablet by mouth once daily  -     CBC; Future  -     Comprehensive Metabolic Panel; Future  -      DIABETES FOOT EXAM  -     POCT glycosylated hemoglobin (Hb A1C)  -     Lipid Panel; Future  -     Microalbumin / Creatinine Urine Ratio; Future    Prostate cancer screening  -     PSA screening; Future    Other orders  -     INFLUENZA, MDCK QUADV, 2 YRS AND OLDER, IM, PF, PREFILL SYR OR SDV, 0.5ML (FLUCELVAX QUADV, PF)  -     clarithromycin (BIAXIN) 500 MG tablet; Take 1 tablet by mouth 2 times daily for 10 days        Assessment    1. Hydronidus suppurativa  2. Diabetes  3. Essential hypertension  4. Prostate cancer screening  5. Groin rash  6. Obesity    Plan    1. Continue diabetic regimen, recheck hemoglobin A1c  2. Start clarithromycin, noted documented multiple antibiotic allergies  3. We will administer influenza vaccine            Return in about 6 months (around 4/22/2022).         Electronically signed by Clary Olivo DO on 10/22/2021 at 11:32 AM    Clary Olivo DO

## 2021-11-09 ENCOUNTER — OFFICE VISIT (OUTPATIENT)
Dept: ENT CLINIC | Age: 56
End: 2021-11-09
Payer: COMMERCIAL

## 2021-11-09 VITALS
HEIGHT: 73 IN | WEIGHT: 315 LBS | SYSTOLIC BLOOD PRESSURE: 145 MMHG | DIASTOLIC BLOOD PRESSURE: 98 MMHG | BODY MASS INDEX: 41.75 KG/M2

## 2021-11-09 DIAGNOSIS — J30.9 ALLERGIC RHINITIS, UNSPECIFIED SEASONALITY, UNSPECIFIED TRIGGER: Primary | ICD-10-CM

## 2021-11-09 DIAGNOSIS — J34.89 NASAL DRYNESS: ICD-10-CM

## 2021-11-09 PROCEDURE — G8427 DOCREV CUR MEDS BY ELIG CLIN: HCPCS | Performed by: NURSE PRACTITIONER

## 2021-11-09 PROCEDURE — G8482 FLU IMMUNIZE ORDER/ADMIN: HCPCS | Performed by: NURSE PRACTITIONER

## 2021-11-09 PROCEDURE — G8417 CALC BMI ABV UP PARAM F/U: HCPCS | Performed by: NURSE PRACTITIONER

## 2021-11-09 PROCEDURE — 1036F TOBACCO NON-USER: CPT | Performed by: NURSE PRACTITIONER

## 2021-11-09 PROCEDURE — 3017F COLORECTAL CA SCREEN DOC REV: CPT | Performed by: NURSE PRACTITIONER

## 2021-11-09 PROCEDURE — 99213 OFFICE O/P EST LOW 20 MIN: CPT | Performed by: NURSE PRACTITIONER

## 2021-11-09 RX ORDER — AZELASTINE 1 MG/ML
1 SPRAY, METERED NASAL 2 TIMES DAILY
Qty: 90 ML | Refills: 1 | Status: SHIPPED
Start: 2021-11-09 | End: 2022-05-31 | Stop reason: SDUPTHER

## 2021-11-09 RX ORDER — FLUTICASONE PROPIONATE 50 MCG
2 SPRAY, SUSPENSION (ML) NASAL DAILY
Qty: 48 G | Refills: 1 | Status: SHIPPED
Start: 2021-11-09 | End: 2022-05-31 | Stop reason: SDUPTHER

## 2021-11-09 NOTE — PROGRESS NOTES
mouth daily, Disp: , Rfl:     albuterol sulfate  (90 Base) MCG/ACT inhaler, Inhale 2 puffs into the lungs every 6 hours as needed for Wheezing, Disp: , Rfl:     montelukast (SINGULAIR) 10 MG tablet, Take 10 mg by mouth nightly., Disp: , Rfl:     albuterol (PROVENTIL) (2.5 MG/3ML) 0.083% nebulizer solution, Take 2.5 mg by nebulization every 6 hours as needed. , Disp: , Rfl:   Ciprofloxacin, Levofloxacin, Penicillins, Sulfamethoxazole-trimethoprim, and Biaxin [clarithromycin]  Social History     Tobacco Use    Smoking status: Former Smoker     Packs/day: 2.00     Years: 17.00     Pack years: 34.00     Types: Cigarettes     Start date:      Quit date: 1999     Years since quittin.9    Smokeless tobacco: Never Used   Vaping Use    Vaping Use: Never used   Substance Use Topics    Alcohol use: No    Drug use: Not Currently     Family History   Problem Relation Age of Onset    Cancer Mother     Diabetes Mother        Review of Systems   Constitutional: Negative. Negative for activity change and appetite change. HENT: Positive for congestion, ear pain (Ear fullness) and rhinorrhea. Negative for postnasal drip, sinus pressure and sinus pain. Eyes: Negative. Respiratory: Negative. Negative for shortness of breath and stridor. Cardiovascular: Negative. Negative for chest pain and palpitations. Endocrine: Negative. Musculoskeletal: Negative. Skin: Negative. Neurological: Negative. Negative for dizziness. Hematological: Negative. Psychiatric/Behavioral: Negative. BP (!) 145/98   Ht 6' 1\" (1.854 m)   Wt (!) 417 lb (189.1 kg)   BMI 55.02 kg/m²   Physical Exam  Constitutional:       Appearance: Normal appearance. HENT:      Head: Normocephalic. Right Ear: Tympanic membrane, ear canal and external ear normal.      Left Ear: Tympanic membrane, ear canal and external ear normal.      Nose: No rhinorrhea. Right Turbinates: Pale.       Left Turbinates: Pale.        Mouth/Throat:      Lips: Pink. Mouth: Mucous membranes are moist.      Pharynx: Oropharynx is clear. Eyes:      Conjunctiva/sclera: Conjunctivae normal.      Pupils: Pupils are equal, round, and reactive to light. Cardiovascular:      Rate and Rhythm: Normal rate and regular rhythm. Pulses: Normal pulses. Pulmonary:      Effort: Pulmonary effort is normal. No respiratory distress. Breath sounds: No stridor. Musculoskeletal:         General: Normal range of motion. Cervical back: Normal range of motion. No rigidity. No muscular tenderness. Skin:     General: Skin is warm and dry. Neurological:      General: No focal deficit present. Mental Status: He is alert and oriented to person, place, and time. Psychiatric:         Mood and Affect: Mood normal.         Behavior: Behavior normal.         Thought Content: Thought content normal.         Judgment: Judgment normal.         IMPRESSION/PLAN:  Joslyn Varela was seen today for allergic rhinitis . Diagnoses and all orders for this visit:    Allergic rhinitis, unspecified seasonality, unspecified trigger    Nasal dryness    Other orders  -     fluticasone (FLONASE) 50 MCG/ACT nasal spray; 2 sprays by Each Nostril route daily  -     azelastine (ASTELIN) 0.1 % nasal spray; 1 spray by Nasal route 2 times daily Use in each nostril as directed      Patient will resume Flonase, 2 sprays each nostril once daily as well as Astelin, 1 spray each nostril twice daily as needed. Also recommended for patient begin using nasal saline, 2 sprays each nostril twice daily with nasal saline gel at bedtime. He will continue with his Singulair as previously prescribed. We will follow up in 6 months. He is instructed to call with any new or worsening symptoms prior to his next appointment.           Demi Burch, MSN, FNP-C  [facility]    The information contained in this note has been dictated using drug and medical speech recognition software and may contain errors

## 2021-11-19 ENCOUNTER — OFFICE VISIT (OUTPATIENT)
Dept: FAMILY MEDICINE CLINIC | Age: 56
End: 2021-11-19
Payer: COMMERCIAL

## 2021-11-19 VITALS
TEMPERATURE: 97.8 F | SYSTOLIC BLOOD PRESSURE: 128 MMHG | BODY MASS INDEX: 41.75 KG/M2 | HEIGHT: 73 IN | OXYGEN SATURATION: 98 % | HEART RATE: 90 BPM | DIASTOLIC BLOOD PRESSURE: 82 MMHG | WEIGHT: 315 LBS

## 2021-11-19 DIAGNOSIS — H66.001 NON-RECURRENT ACUTE SUPPURATIVE OTITIS MEDIA OF RIGHT EAR WITHOUT SPONTANEOUS RUPTURE OF TYMPANIC MEMBRANE: Primary | ICD-10-CM

## 2021-11-19 PROCEDURE — G8427 DOCREV CUR MEDS BY ELIG CLIN: HCPCS | Performed by: STUDENT IN AN ORGANIZED HEALTH CARE EDUCATION/TRAINING PROGRAM

## 2021-11-19 PROCEDURE — 1036F TOBACCO NON-USER: CPT | Performed by: STUDENT IN AN ORGANIZED HEALTH CARE EDUCATION/TRAINING PROGRAM

## 2021-11-19 PROCEDURE — 99213 OFFICE O/P EST LOW 20 MIN: CPT | Performed by: STUDENT IN AN ORGANIZED HEALTH CARE EDUCATION/TRAINING PROGRAM

## 2021-11-19 PROCEDURE — G8482 FLU IMMUNIZE ORDER/ADMIN: HCPCS | Performed by: STUDENT IN AN ORGANIZED HEALTH CARE EDUCATION/TRAINING PROGRAM

## 2021-11-19 PROCEDURE — 3017F COLORECTAL CA SCREEN DOC REV: CPT | Performed by: STUDENT IN AN ORGANIZED HEALTH CARE EDUCATION/TRAINING PROGRAM

## 2021-11-19 PROCEDURE — G8417 CALC BMI ABV UP PARAM F/U: HCPCS | Performed by: STUDENT IN AN ORGANIZED HEALTH CARE EDUCATION/TRAINING PROGRAM

## 2021-11-19 RX ORDER — CLINDAMYCIN HYDROCHLORIDE 300 MG/1
300 CAPSULE ORAL 3 TIMES DAILY
Qty: 21 CAPSULE | Refills: 0 | Status: SHIPPED | OUTPATIENT
Start: 2021-11-19 | End: 2021-11-26

## 2021-11-19 ASSESSMENT — ENCOUNTER SYMPTOMS
NAUSEA: 0
ABDOMINAL PAIN: 0
RHINORRHEA: 0
COUGH: 0
SHORTNESS OF BREATH: 0
VOMITING: 0

## 2021-11-19 NOTE — PROGRESS NOTES
Ramses Krishnan (:  1965) is a 64 y.o. male,Established patient, here for evaluation of the following chief complaint(s):  Otalgia (Right ear pain. Started today )         ASSESSMENT/PLAN:  1. Non-recurrent acute suppurative otitis media of right ear without spontaneous rupture of tympanic membrane  -     clindamycin (CLEOCIN) 300 MG capsule; Take 1 capsule by mouth 3 times daily for 7 days, Disp-21 capsule, R-0Normal    Appears to have otitis. Vast amount of drug sensitivities. Will try to treat with clindamycin. Call his ENT if he is not feeling improved by Monday. .Discussed return and ER precautions. Patient and or parent verbalized understanding    Return if symptoms worsen or fail to improve. Subjective   SUBJECTIVE/OBJECTIVE:  Ear pain  -right side  -felt fullness with some drainage  -as the day when on it has turned into a dull ache, feels like the sound is not normal  -has not had this issue before  -no fever  -no sore throat  -has some congestion from allergies      Review of Systems   Constitutional: Negative for chills and fever. HENT: Positive for ear pain. Negative for congestion and rhinorrhea. Respiratory: Negative for cough and shortness of breath. Cardiovascular: Negative for chest pain and leg swelling. Gastrointestinal: Negative for abdominal pain, nausea and vomiting. Genitourinary: Negative for dysuria and hematuria. Musculoskeletal: Negative for arthralgias and myalgias. Skin: Negative for rash and wound. Neurological: Negative for dizziness and light-headedness. Objective   Physical Exam  Vitals reviewed. Constitutional:       General: He is not in acute distress. HENT:      Head: Normocephalic and atraumatic. Right Ear: Tenderness present. No drainage. Tympanic membrane is erythematous and bulging. Left Ear: Tympanic membrane normal.   Eyes:      Extraocular Movements: Extraocular movements intact.       Conjunctiva/sclera: Conjunctivae normal.   Cardiovascular:      Rate and Rhythm: Normal rate and regular rhythm. Pulmonary:      Effort: Pulmonary effort is normal.      Breath sounds: Normal breath sounds. No wheezing. Abdominal:      General: Abdomen is flat. There is no distension. Musculoskeletal:         General: No tenderness or deformity. Neurological:      General: No focal deficit present. Mental Status: He is alert and oriented to person, place, and time. An electronic signature was used to authenticate this note.     --Rebecca Rodriguez MD

## 2021-11-22 ASSESSMENT — ENCOUNTER SYMPTOMS
EYES NEGATIVE: 1
STRIDOR: 0
RESPIRATORY NEGATIVE: 1
SINUS PRESSURE: 0
SINUS PAIN: 0
RHINORRHEA: 1
SHORTNESS OF BREATH: 0

## 2021-11-24 ENCOUNTER — PROCEDURE VISIT (OUTPATIENT)
Dept: AUDIOLOGY | Age: 56
End: 2021-11-24
Payer: COMMERCIAL

## 2021-11-24 ENCOUNTER — OFFICE VISIT (OUTPATIENT)
Dept: ENT CLINIC | Age: 56
End: 2021-11-24
Payer: COMMERCIAL

## 2021-11-24 VITALS
SYSTOLIC BLOOD PRESSURE: 157 MMHG | HEIGHT: 73 IN | BODY MASS INDEX: 41.75 KG/M2 | DIASTOLIC BLOOD PRESSURE: 78 MMHG | WEIGHT: 315 LBS | HEART RATE: 78 BPM

## 2021-11-24 DIAGNOSIS — H92.09 EAR PAIN, REFERRED, UNSPECIFIED LATERALITY: Primary | ICD-10-CM

## 2021-11-24 DIAGNOSIS — H92.03 OTALGIA OF BOTH EARS: Primary | ICD-10-CM

## 2021-11-24 DIAGNOSIS — H69.81 DYSFUNCTION OF RIGHT EUSTACHIAN TUBE: ICD-10-CM

## 2021-11-24 PROCEDURE — G8417 CALC BMI ABV UP PARAM F/U: HCPCS | Performed by: NURSE PRACTITIONER

## 2021-11-24 PROCEDURE — G8427 DOCREV CUR MEDS BY ELIG CLIN: HCPCS | Performed by: NURSE PRACTITIONER

## 2021-11-24 PROCEDURE — 3017F COLORECTAL CA SCREEN DOC REV: CPT | Performed by: NURSE PRACTITIONER

## 2021-11-24 PROCEDURE — G8482 FLU IMMUNIZE ORDER/ADMIN: HCPCS | Performed by: NURSE PRACTITIONER

## 2021-11-24 PROCEDURE — 1036F TOBACCO NON-USER: CPT | Performed by: NURSE PRACTITIONER

## 2021-11-24 PROCEDURE — 99213 OFFICE O/P EST LOW 20 MIN: CPT | Performed by: NURSE PRACTITIONER

## 2021-11-24 PROCEDURE — 92567 TYMPANOMETRY: CPT | Performed by: AUDIOLOGIST

## 2021-11-24 NOTE — PROGRESS NOTES
[facility] Otolaryngology  Dr. Juancho Watts. Cathi Jeff. Ms.Ed        Patient Name:  Ramses Krishnan  :  1965     CHIEF C/O:    Chief Complaint   Patient presents with    Follow-up     follow up from urgent care for ear pain,        HISTORY OBTAINED FROM:  patient    HISTORY OF PRESENT ILLNESS:       Jelly Kent is a 64y.o. year old male, here today for follow up of right ear pain    Started 1 week ago, increased 5 days ago  Seen at urgent care and placed on clindamycin  Some improvement with continued aching  Currently using flonase and astelin for chronic allergy symptoms  Hx of recurrent ear infections without tubes  No current drainage from the ear. Symptoms continue to improve.           Past Medical History:   Diagnosis Date    Asthma     Diabetes mellitus ([address]     Hypertension     Prolonged emergence from general anesthesia      Past Surgical History:   Procedure Laterality Date    APPENDECTOMY      COLONOSCOPY N/A 4/15/2021    COLONOSCOPY WITH BIOPSY performed by Ozzy Castano DO at [address] [place] [address]      UPPER GASTROINTESTINAL ENDOSCOPY N/A 4/15/2021    EGD ESOPHAGOGASTRODUODENOSCOPY DILATATION performed by Ozzy Castano DO at [facility] ENDOSCOPY       Current Outpatient Medications:     clindamycin (CLEOCIN) 300 MG capsule, Take 1 capsule by mouth 3 times daily for 7 days, Disp: 21 capsule, Rfl: 0    fluticasone (FLONASE) 50 MCG/ACT nasal spray, 2 sprays by Each Nostril route daily, Disp: 48 g, Rfl: 1    azelastine (ASTELIN) 0.1 % nasal spray, 1 spray by Nasal route 2 times daily Use in each nostril as directed, Disp: 90 mL, Rfl: 1    pantoprazole (PROTONIX) 20 MG tablet, TAKE 1 TABLET BY MOUTH ONCE DAILY, Disp: , Rfl:     lisinopril (PRINIVIL;ZESTRIL) 40 MG tablet, Take 1/2 (one-half) tablet by mouth twice daily, Disp: 90 tablet, Rfl: 1    pioglitazone (ACTOS) 30 MG tablet, Take 1/2 (one-half) tablet by mouth once daily, Disp: 45 tablet, Rfl: 5   clotrimazole-betamethasone (LOTRISONE) 1-0.05 % cream, Apply topically 2 times daily. 45 g, Disp: 45 g, Rfl: 0    fexofenadine (ALLEGRA) 180 MG tablet, Take 180 mg by mouth daily, Disp: , Rfl:     albuterol sulfate  (90 Base) MCG/ACT inhaler, Inhale 2 puffs into the lungs every 6 hours as needed for Wheezing, Disp: , Rfl:     montelukast (SINGULAIR) 10 MG tablet, Take 10 mg by mouth nightly., Disp: , Rfl:     albuterol (PROVENTIL) (2.5 MG/3ML) 0.083% nebulizer solution, Take 2.5 mg by nebulization every 6 hours as needed. , Disp: , Rfl:   Ciprofloxacin, Levofloxacin, Penicillins, Sulfamethoxazole-trimethoprim, and Biaxin [clarithromycin]  Social History     Tobacco Use    Smoking status: Former Smoker     Packs/day: 2.00     Years: 17.00     Pack years: 34.00     Types: Cigarettes     Start date:      Quit date: 1999     Years since quittin.9    Smokeless tobacco: Never Used   Vaping Use    Vaping Use: Never used   Substance Use Topics    Alcohol use: No    Drug use: Not Currently     Family History   Problem Relation Age of Onset    Cancer Mother     Diabetes Mother        Review of Systems   Constitutional: Negative. Negative for activity change and appetite change. HENT: Positive for ear pain (Ear fullness - right). Negative for congestion and ear discharge. Eyes: Negative. Respiratory: Negative. Negative for shortness of breath and stridor. Cardiovascular: Negative. Negative for chest pain and palpitations. Endocrine: Negative. Musculoskeletal: Negative. Skin: Negative. Neurological: Negative. Negative for dizziness. Hematological: Negative. Psychiatric/Behavioral: Negative. BP (!) 157/78   Pulse 78   Ht 6' 1\" (1.854 m)   Wt (!) 422 lb (191.4 kg)   BMI 55.68 kg/m²   Physical Exam  Constitutional:       Appearance: Normal appearance. HENT:      Head: Normocephalic.       Right Ear: Tympanic membrane, ear canal and external ear normal. Left Ear: Tympanic membrane, ear canal and external ear normal.      Nose: Nose normal.      Mouth/Throat:      Lips: Pink. Mouth: Mucous membranes are moist.   Eyes:      Conjunctiva/sclera: Conjunctivae normal.      Pupils: Pupils are equal, round, and reactive to light. Cardiovascular:      Rate and Rhythm: Normal rate and regular rhythm. Pulses: Normal pulses. Pulmonary:      Effort: Pulmonary effort is normal. No respiratory distress. Breath sounds: No stridor. Musculoskeletal:         General: Normal range of motion. Cervical back: Normal range of motion. No rigidity. No muscular tenderness. Skin:     General: Skin is warm and dry. Neurological:      General: No focal deficit present. Mental Status: He is alert and oriented to person, place, and time. Psychiatric:         Mood and Affect: Mood normal.         Behavior: Behavior normal.         Thought Content: Thought content normal.         Judgment: Judgment normal.         Tympanogram reviewed with patient. Reveals type A curve in the right ear, with type A curve in the left ear. IMPRESSION/PLAN:    Crystal Kam was seen today for follow-up. Diagnoses and all orders for this visit:    Ear pain, referred, unspecified laterality  -     Tympanometry; Future    Dysfunction of right eustachian tube      Tympanogram reviewed with patient showing normal type a curves with mild positive pressure in the right ear. Bilateral TMs appear normal without sign of middle ear effusion or retraction. There is no erythema or edema of either ear canal.  Patient will continue with current antibiotic therapy as well as current allergy treatments. He will follow-up as previously scheduled in 6 months for routine allergy follow-up. He is instructed to call with any new or worsening symptoms prior to his next appointment.         Juanis Wilson, MSN, FNP-C  [facility], Nose and Throat    The information contained in this note has been dictated using drug and medical speech recognition software and may contain errors

## 2021-11-24 NOTE — PROGRESS NOTES
This patient was referred for audiometric/tympanometric testing by DYLON Erickson due to ear pain. Tympanometry revealed normal middle ear peak pressure and compliance, bilaterally. The results were reviewed with the patient. Recommendations for follow up will be made pending physician consult.         Emanuel Rabago CCC-A  [address] [facility] [id number]  Electronically signed by Emanuel Rabago on 11/24/2021 at 11:48 AM

## 2021-11-30 ENCOUNTER — OFFICE VISIT (OUTPATIENT)
Dept: PRIMARY CARE CLINIC | Age: 56
End: 2021-11-30
Payer: COMMERCIAL

## 2021-11-30 VITALS
OXYGEN SATURATION: 98 % | DIASTOLIC BLOOD PRESSURE: 88 MMHG | WEIGHT: 315 LBS | BODY MASS INDEX: 55.28 KG/M2 | HEART RATE: 94 BPM | SYSTOLIC BLOOD PRESSURE: 134 MMHG | TEMPERATURE: 97 F

## 2021-11-30 DIAGNOSIS — I15.9 SECONDARY HYPERTENSION: ICD-10-CM

## 2021-11-30 DIAGNOSIS — R21 GROIN RASH: Primary | ICD-10-CM

## 2021-11-30 PROCEDURE — 3017F COLORECTAL CA SCREEN DOC REV: CPT | Performed by: INTERNAL MEDICINE

## 2021-11-30 PROCEDURE — G8417 CALC BMI ABV UP PARAM F/U: HCPCS | Performed by: INTERNAL MEDICINE

## 2021-11-30 PROCEDURE — 99213 OFFICE O/P EST LOW 20 MIN: CPT | Performed by: INTERNAL MEDICINE

## 2021-11-30 PROCEDURE — G8482 FLU IMMUNIZE ORDER/ADMIN: HCPCS | Performed by: INTERNAL MEDICINE

## 2021-11-30 PROCEDURE — G8427 DOCREV CUR MEDS BY ELIG CLIN: HCPCS | Performed by: INTERNAL MEDICINE

## 2021-11-30 PROCEDURE — 1036F TOBACCO NON-USER: CPT | Performed by: INTERNAL MEDICINE

## 2021-11-30 NOTE — PROGRESS NOTES
11/30/21    Tenzin Vo, a male of 64 y.o. presents today for Leg Pain (rt )    At last appointment,   we discussed his hydradenitis suppurativa. He was started on clarithromycin due to multiple medication allergies. He was also given the influenza vaccine. He was seen by ear nose and throat for right ear pain. Prior to this was previously placed on clindamycin. His groin issue was improving but then came back. He denies any fevers chills or night sweats. He denies any issues or side effects with medications. Patient Active Problem List   Diagnosis    Chest pain    Type 2 diabetes mellitus without complication (HCC)    Class 3 obesity due to excess calories without serious comorbidity with body mass index (BMI) of 50.0 to 59.9 in adult    Secondary hypertension    Allergies    Acute non-recurrent maxillary sinusitis      Allergies   Allergen Reactions    Ciprofloxacin     Levofloxacin Other (See Comments)     pain    Penicillins Hives    Sulfamethoxazole-Trimethoprim      UPSET STOAMCH    Biaxin [Clarithromycin] Other (See Comments)     Racing heart       Current Outpatient Medications on File Prior to Visit   Medication Sig Dispense Refill    fluticasone (FLONASE) 50 MCG/ACT nasal spray 2 sprays by Each Nostril route daily 48 g 1    azelastine (ASTELIN) 0.1 % nasal spray 1 spray by Nasal route 2 times daily Use in each nostril as directed 90 mL 1    pantoprazole (PROTONIX) 20 MG tablet TAKE 1 TABLET BY MOUTH ONCE DAILY      lisinopril (PRINIVIL;ZESTRIL) 40 MG tablet Take 1/2 (one-half) tablet by mouth twice daily 90 tablet 1    pioglitazone (ACTOS) 30 MG tablet Take 1/2 (one-half) tablet by mouth once daily 45 tablet 5    clotrimazole-betamethasone (LOTRISONE) 1-0.05 % cream Apply topically 2 times daily.  45 g 45 g 0    fexofenadine (ALLEGRA) 180 MG tablet Take 180 mg by mouth daily      albuterol sulfate  (90 Base) MCG/ACT inhaler Inhale 2 puffs into the lungs every 6 hours as needed for Wheezing      montelukast (SINGULAIR) 10 MG tablet Take 10 mg by mouth nightly.  albuterol (PROVENTIL) (2.5 MG/3ML) 0.083% nebulizer solution Take 2.5 mg by nebulization every 6 hours as needed. No current facility-administered medications on file prior to visit. Review of Systems  Constitutional:Negative for activity change, appetite change, chills, fatigue and fever. Respiratory: Negative for choking, chest tightness, shortness of breath and wheezing. Cardiovascular: Negative for chest pain, palpitations and leg swelling. Gastrointestinal: Negative for abdominal distention, constipation, diarrhea, nausea and vomiting. Musculoskeletal: Negative for arthralgias, back pain, gait problem and joint swelling. Neurological: Negative for dizziness, weakness,numbness and headaches. /88   Pulse 94   Temp 97 °F (36.1 °C)   Wt (!) 419 lb (190.1 kg)   SpO2 98%   BMI 55.28 kg/m²      Physical Exam   Constitutional:  Oriented to person, place, and time. Appears well-developed and well-nourished. No acute distress. HENT: No sinus tenderness or lymphadenopathy  Head: Normocephalic and atraumatic. Eyes: Eyes exhibits no discharge. No scleral icterus present. Neck: No tracheal deviation present. No thyromegaly present. Cardiovascular: Normal rate, regular rhythm, normal heart sounds and intact distal pulses. Exam reveals no gallop nor friction rub. No murmur heard. Pulmonary: Effort normal and breath sounds normal. No respiratory distress. No wheezes or rales. Abdomen: No signs of rigidity rebound or organomegaly  Musculoskeletal:  No tenderness to palpation  Neurological:Alert and oriented to person, place, and time. Skin: erythema around the groin without drainage  Psychiatric: Normal mood and affect. Behavior is Normal.     ASSESSMENT AND PLAN:    Kanwal Siu was seen today for leg pain.     Diagnoses and all orders for this visit:    Groin rash    Secondary hypertension        Assessment    1. Suppurativa hidradenitis  2. Essential hypertension  3. Obesity  4. Diabetes    Plan    1. Defer antibiotic therapy for now in light of multiple courses  2. Advised to follow back up with dermatology in 6 days  3.  He is going to get his booster next week    Return to clinic as needed if symptoms worsen      Electronically signed by Mariajose Beltran DO on 11/30/2021 at 3:15 PM    Mariajose Beltran DO

## 2021-12-09 ASSESSMENT — ENCOUNTER SYMPTOMS
RESPIRATORY NEGATIVE: 1
STRIDOR: 0
SHORTNESS OF BREATH: 0
EYES NEGATIVE: 1

## 2022-01-04 ENCOUNTER — OFFICE VISIT (OUTPATIENT)
Dept: FAMILY MEDICINE CLINIC | Age: 57
End: 2022-01-04
Payer: COMMERCIAL

## 2022-01-04 VITALS
HEART RATE: 90 BPM | WEIGHT: 315 LBS | OXYGEN SATURATION: 95 % | TEMPERATURE: 97.3 F | BODY MASS INDEX: 55.41 KG/M2 | DIASTOLIC BLOOD PRESSURE: 60 MMHG | SYSTOLIC BLOOD PRESSURE: 134 MMHG

## 2022-01-04 DIAGNOSIS — M25.511 ACUTE PAIN OF RIGHT SHOULDER: Primary | ICD-10-CM

## 2022-01-04 PROCEDURE — 1036F TOBACCO NON-USER: CPT | Performed by: PHYSICIAN ASSISTANT

## 2022-01-04 PROCEDURE — G8427 DOCREV CUR MEDS BY ELIG CLIN: HCPCS | Performed by: PHYSICIAN ASSISTANT

## 2022-01-04 PROCEDURE — 3017F COLORECTAL CA SCREEN DOC REV: CPT | Performed by: PHYSICIAN ASSISTANT

## 2022-01-04 PROCEDURE — 99214 OFFICE O/P EST MOD 30 MIN: CPT | Performed by: PHYSICIAN ASSISTANT

## 2022-01-04 PROCEDURE — G8482 FLU IMMUNIZE ORDER/ADMIN: HCPCS | Performed by: PHYSICIAN ASSISTANT

## 2022-01-04 PROCEDURE — G8417 CALC BMI ABV UP PARAM F/U: HCPCS | Performed by: PHYSICIAN ASSISTANT

## 2022-01-04 NOTE — PROGRESS NOTES
22  Saurabh Barraza : 1965 Sex: male  Age 64 y.o. Subjective:  Chief Complaint   Patient presents with    Shoulder Pain     R side no injury          HPI:   Saurabh Barraza , 64 y.o. male presents to express care for evaluation of right shoulder pain    HPI    CC:  Right shoulder pain    Onset:  1 week ago    Treatments:  No meds    Aggravating or Alleviating factors:  Right shoulder pain worse with movement  Chest pain:  No   SOB:  No   Myalgias: No     Miscellaneous: Right shoulder pain. The patient states that he has been doing a lot of heavy lifting. He has been lifting batteries that are about 125 pounds apiece. No direct injury or trauma. The patient is not having numbness or tingling. ROS:   Unless otherwise stated in this report the patient's positive and negative responses for review of systems for constitutional, eyes, ENT, cardiovascular, respiratory, gastrointestinal, neurological, , musculoskeletal, and integument systems and related systems to the presenting problem are either stated in the history of present illness or were not pertinent or were negative for the symptoms and/or complaints related to the presenting medical problem. Positives and pertinent negatives as per HPI. All others reviewed and are negative.       PMH:     Past Medical History:   Diagnosis Date    Asthma     Diabetes mellitus ([address]     Hypertension     Prolonged emergence from general anesthesia        Past Surgical History:   Procedure Laterality Date    APPENDECTOMY      COLONOSCOPY N/A 4/15/2021    COLONOSCOPY WITH BIOPSY performed by Rosangela Oakes DO at [address] [place]      FOOT SURGERY      TONSILLECTOMY      UPPER GASTROINTESTINAL ENDOSCOPY N/A 4/15/2021    EGD ESOPHAGOGASTRODUODENOSCOPY DILATATION performed by Rosangela Oakes DO at [facility] ENDOSCOPY       Family History   Problem Relation Age of Onset    Cancer Mother     Diabetes Mother        Medications:     Current Outpatient Medications:     fluticasone (FLONASE) 50 MCG/ACT nasal spray, 2 sprays by Each Nostril route daily, Disp: 48 g, Rfl: 1    azelastine (ASTELIN) 0.1 % nasal spray, 1 spray by Nasal route 2 times daily Use in each nostril as directed, Disp: 90 mL, Rfl: 1    pantoprazole (PROTONIX) 20 MG tablet, TAKE 1 TABLET BY MOUTH ONCE DAILY, Disp: , Rfl:     lisinopril (PRINIVIL;ZESTRIL) 40 MG tablet, Take 1/2 (one-half) tablet by mouth twice daily, Disp: 90 tablet, Rfl: 1    pioglitazone (ACTOS) 30 MG tablet, Take 1/2 (one-half) tablet by mouth once daily, Disp: 45 tablet, Rfl: 5    clotrimazole-betamethasone (LOTRISONE) 1-0.05 % cream, Apply topically 2 times daily. 45 g, Disp: 45 g, Rfl: 0    fexofenadine (ALLEGRA) 180 MG tablet, Take 180 mg by mouth daily, Disp: , Rfl:     albuterol sulfate  (90 Base) MCG/ACT inhaler, Inhale 2 puffs into the lungs every 6 hours as needed for Wheezing, Disp: , Rfl:     montelukast (SINGULAIR) 10 MG tablet, Take 10 mg by mouth nightly., Disp: , Rfl:     albuterol (PROVENTIL) (2.5 MG/3ML) 0.083% nebulizer solution, Take 2.5 mg by nebulization every 6 hours as needed. , Disp: , Rfl:     Allergies: Allergies   Allergen Reactions    Ciprofloxacin     Levofloxacin Other (See Comments)     pain    Penicillins Hives    Sulfamethoxazole-Trimethoprim      UPSET STOAMCH    Biaxin [Clarithromycin] Other (See Comments)     Racing heart         Social History:     Social History     Tobacco Use    Smoking status: Former Smoker     Packs/day: 2.00     Years: 17.00     Pack years: 34.00     Types: Cigarettes     Start date:      Quit date: 1999     Years since quittin.1    Smokeless tobacco: Never Used   Vaping Use    Vaping Use: Never used   Substance Use Topics    Alcohol use: No    Drug use: Not Currently       Patient lives at home.     Physical Exam:     Vitals:    22 1111   BP: 134/60   Pulse: 90   Temp: 97.3 °F (36.3 °C)   SpO2: 95%   Weight: (!) 420 lb (190.5 kg)       Exam:  Physical Exam  Vital signs reviewed and nurse's notes. The patient is not hypoxic. General: Alert, no acute distress, patient resting comfortably   Skin: warm, intact, no pallor noted   Head: Normocephalic, atraumatic   Eye: Normal conjunctiva   Respiratory: No acute distress   Musculoskeletal: There is no obvious deformity noted to the right shoulder to the right upper extremity. The patient has most of his pain to the anterior superior portion of the shoulder. The patient has limited range of motion due to pain. Any flexion, extension elicits the pain. The patient has no deficit in the elbow. No deficit in the wrist.  Normal equal  strength. Pulses intact. No significant pain to the scapular area or the cervical spine. Neurological: alert and orient x4, normal sensory and motor observed. Psychiatric: Cooperative        Testing:           Medical Decision Making:     Vital signs reviewed    Past medical history reviewed. Allergies reviewed. Medications reviewed. Patient on arrival does not appear to be in any apparent distress or discomfort. The patient has been seen and evaluated. The patient does not appear to be toxic or lethargic. The patient was sent for an x-ray of the right shoulder. The patient did not have any traumatic injury. There are some arthritic changes noted to the right shoulder on the x-ray. I did not see any evidence of acute fracture. Formal radiology report is pending at this time. The patient is to ice the area. The patient does not want any medications at this time. He will stick with Tylenol at home. The patient is to return to express care or go directly to the emergency department should any of the signs or symptoms worsen. The patient is to followup with primary care physician in 2-3 days for repeat evaluation.  The patient has no other questions or concerns at this time the patient will be discharged home.      Clinical Impression:   Patricia Posey was seen today for shoulder pain. Diagnoses and all orders for this visit:    Acute pain of right shoulder  -     XR SHOULDER RIGHT (MIN 2 VIEWS); Future        The patient is to call for any concerns or return if any of the signs or symptoms worsen. The patient is to follow-up with PCP in the next 2-3 days for repeat evaluation repeat assessment or go directly to the emergency department.      SIGNATURE: Iván Lay III, PA-C

## 2022-01-13 ENCOUNTER — OFFICE VISIT (OUTPATIENT)
Dept: PRIMARY CARE CLINIC | Age: 57
End: 2022-01-13
Payer: COMMERCIAL

## 2022-01-13 VITALS
WEIGHT: 315 LBS | TEMPERATURE: 97.6 F | SYSTOLIC BLOOD PRESSURE: 128 MMHG | HEART RATE: 89 BPM | DIASTOLIC BLOOD PRESSURE: 80 MMHG | BODY MASS INDEX: 54.62 KG/M2 | OXYGEN SATURATION: 96 %

## 2022-01-13 DIAGNOSIS — I15.9 SECONDARY HYPERTENSION: ICD-10-CM

## 2022-01-13 DIAGNOSIS — E66.9 OBESITY WITH BODY MASS INDEX GREATER THAN 30: ICD-10-CM

## 2022-01-13 DIAGNOSIS — E11.9 TYPE 2 DIABETES MELLITUS WITHOUT COMPLICATION, WITHOUT LONG-TERM CURRENT USE OF INSULIN (HCC): Primary | ICD-10-CM

## 2022-01-13 DIAGNOSIS — J32.9 CHRONIC SINUSITIS, UNSPECIFIED LOCATION: ICD-10-CM

## 2022-01-13 DIAGNOSIS — M25.511 RIGHT SHOULDER PAIN, UNSPECIFIED CHRONICITY: ICD-10-CM

## 2022-01-13 PROBLEM — J01.00 ACUTE NON-RECURRENT MAXILLARY SINUSITIS: Status: RESOLVED | Noted: 2020-04-11 | Resolved: 2022-01-13

## 2022-01-13 PROBLEM — T78.40XA ALLERGIES: Status: RESOLVED | Noted: 2020-04-11 | Resolved: 2022-01-13

## 2022-01-13 PROCEDURE — 1036F TOBACCO NON-USER: CPT | Performed by: INTERNAL MEDICINE

## 2022-01-13 PROCEDURE — 3046F HEMOGLOBIN A1C LEVEL >9.0%: CPT | Performed by: INTERNAL MEDICINE

## 2022-01-13 PROCEDURE — G8482 FLU IMMUNIZE ORDER/ADMIN: HCPCS | Performed by: INTERNAL MEDICINE

## 2022-01-13 PROCEDURE — 99213 OFFICE O/P EST LOW 20 MIN: CPT | Performed by: INTERNAL MEDICINE

## 2022-01-13 PROCEDURE — 3017F COLORECTAL CA SCREEN DOC REV: CPT | Performed by: INTERNAL MEDICINE

## 2022-01-13 PROCEDURE — G8417 CALC BMI ABV UP PARAM F/U: HCPCS | Performed by: INTERNAL MEDICINE

## 2022-01-13 PROCEDURE — 2022F DILAT RTA XM EVC RTNOPTHY: CPT | Performed by: INTERNAL MEDICINE

## 2022-01-13 PROCEDURE — G8427 DOCREV CUR MEDS BY ELIG CLIN: HCPCS | Performed by: INTERNAL MEDICINE

## 2022-01-13 RX ORDER — BENZOYL PEROXIDE 100 MG/ML
LIQUID TOPICAL
COMMUNITY
Start: 2021-12-14 | End: 2022-08-10

## 2022-01-13 RX ORDER — CLINDAMYCIN PHOSPHATE 10 UG/ML
LOTION TOPICAL
COMMUNITY
Start: 2021-12-11

## 2022-01-13 RX ORDER — PREDNISONE 1 MG/1
TABLET ORAL
Qty: 30 TABLET | Refills: 0 | Status: SHIPPED
Start: 2022-01-13 | End: 2022-05-06

## 2022-01-13 NOTE — PROGRESS NOTES
1/13/22    Haley Albrecht, a male of 64 y.o. presents today for Shoulder Pain    At last appointment,   he was seen for suppurativa hydradenitis. Antibiotics were deferred at that time he was advised to follow-up with dermatology. He was seen in urgent care on January 4 for right shoulder pain. Radiography was performed. His x-ray revealed mild osteoarthritis. He was advised to continue ice Tylenol. He was doing a lot of heavy lifting before christmas. He is having pain over the anterior part of the shoulder. Allergies   Allergen Reactions    Ciprofloxacin     Levofloxacin Other (See Comments)     pain    Penicillins Hives    Sulfamethoxazole-Trimethoprim      UPSET STOAMCH    Biaxin [Clarithromycin] Other (See Comments)     Racing heart       Current Outpatient Medications on File Prior to Visit   Medication Sig Dispense Refill    BENZOYL PEROXIDE 10 % external wash USE TO [address] ONCE DAILY AS DIRECTED      clindamycin (CLEOCIN T) 1 % lotion APPLY TO AFFECTED AREA DAILY AS DIRECTED      fluticasone (FLONASE) 50 MCG/ACT nasal spray 2 sprays by Each Nostril route daily 48 g 1    azelastine (ASTELIN) 0.1 % nasal spray 1 spray by Nasal route 2 times daily Use in each nostril as directed 90 mL 1    pantoprazole (PROTONIX) 20 MG tablet TAKE 1 TABLET BY MOUTH ONCE DAILY      lisinopril (PRINIVIL;ZESTRIL) 40 MG tablet Take 1/2 (one-half) tablet by mouth twice daily 90 tablet 1    pioglitazone (ACTOS) 30 MG tablet Take 1/2 (one-half) tablet by mouth once daily 45 tablet 5    clotrimazole-betamethasone (LOTRISONE) 1-0.05 % cream Apply topically 2 times daily. 45 g 45 g 0    fexofenadine (ALLEGRA) 180 MG tablet Take 180 mg by mouth daily      albuterol sulfate  (90 Base) MCG/ACT inhaler Inhale 2 puffs into the lungs every 6 hours as needed for Wheezing      montelukast (SINGULAIR) 10 MG tablet Take 10 mg by mouth nightly.       albuterol (PROVENTIL) (2.5 MG/3ML) 0.083% nebulizer solution Take 2.5 mg by nebulization every 6 hours as needed. No current facility-administered medications on file prior to visit. Patient Active Problem List    Diagnosis Date Noted    Obesity with body mass index greater than 30 01/13/2022    Chronic sinusitis 01/13/2022    Type [facility] diabetes mellitus [facility] 38/05/9817    Secondary hypertension 02/01/2018       Review of Systems  Constitutional:Negative for activity change, appetite change, chills, fatigue and fever. Respiratory: Negative for choking, chest tightness, shortness of breath and wheezing. Cardiovascular: Negative for chest pain, palpitations and leg swelling. Gastrointestinal: Negative for abdominal distention, constipation, diarrhea, nausea and vomiting. Musculoskeletal: Negative for arthralgias, back pain, gait problem and joint swelling. Neurological: Negative for dizziness, weakness,numbness and headaches. /80   Pulse 89   Temp 97.6 °F (36.4 °C)   Wt (!) 414 lb (187.8 kg)   SpO2 96%   BMI 54.62 kg/m²      Physical Exam   Constitutional:  Oriented to person, place, and time. Appears well-developed and well-nourished. No acute distress. HENT: No sinus tenderness or lymphadenopathy  Head: Normocephalic and atraumatic. Eyes: Eyes exhibits no discharge. No scleral icterus present. Neck: No tracheal deviation present. No thyromegaly present. Cardiovascular: Normal rate, regular rhythm, normal heart sounds and intact distal pulses. Exam reveals no gallop nor friction rub. No murmur heard. Pulmonary: Effort normal and breath sounds normal. No respiratory distress. No wheezes or rales. Abdomen: No signs of rigidity rebound or organomegaly  Musculoskeletal:  No tenderness to palpation  Neurological:Alert and oriented to person, place, and time. Skin: No diaphoresis. Psychiatric: Normal mood and affect.  Behavior is Normal.     ASSESSMENT AND PLAN:    Assessment     1. R Shoulder pain likely rotator cuff tendinopathy    Plan    1. Start prednisone at low-dose due to previous reactions to higher dosages  2. X-ray unremarkable  3. If his symptoms persist he may benefit from physical therapy versus referral back to orthopedics (Dr. Juan Luis Vasquez)    Return if symptoms worsen or fail to improve.         Electronically signed by Princess Nixon DO on 1/13/2022 at 9:49 AM    Princess Nixon DO

## 2022-01-13 NOTE — PATIENT INSTRUCTIONS
Rotator Cuff: Exercises  Introduction  Here are some examples of exercises for you to try. The exercises may be suggested for a condition or for rehabilitation. Start each exercise slowly. Ease off the exercises if you start to have pain. You will be told when to start these exercises and which ones will work best for you. How to do the exercises      Pendulum swing   If you have pain in your back, do not do this exercise. 1. Hold on to a table or the back of a chair with your good arm. Then bend forward a little and let your sore arm hang straight down. This exercise does not use the arm muscles. Rather, use your legs and your hips to create movement that makes your arm swing freely. 2. Use the movement from your hips and legs to guide the slightly swinging arm back and forth like a pendulum (or elephant trunk). Then guide it in circles that start small (about the size of a dinner plate). Make the circles a bit larger each day, as your pain allows. 3. Do this exercise for 5 minutes, 5 to 7 times each day. 4. As you have less pain, try bending over a little farther to do this exercise. This will increase the amount of movement at your shoulder. Posterior stretching exercise   1. Hold the elbow of your injured arm with your other hand. 2. Use your hand to pull your injured arm gently up and across your body. You will feel a gentle stretch across the back of your injured shoulder. 3. Hold for at least 15 to 30 seconds. Then slowly lower your arm. 4. Repeat 2 to 4 times. Up-the-back stretch   Your doctor or physical therapist may want you to wait to do this stretch until you have regained most of your range of motion and strength. You can do this stretch in different ways. Hold any of these stretches for at least 15 to 30 seconds. Repeat them 2 to 4 times. 1. Light stretch: Put your hand in your back pocket. Let it rest there to stretch your shoulder. 2. Moderate stretch:  With your other hand, hold your injured arm (palm outward) behind your back by the wrist. Pull your arm up gently to stretch your shoulder. 3. Advanced stretch: Put a towel over your other shoulder. Put the hand of your injured arm behind your back. Now hold the back end of the towel. With the other hand, hold the front end of the towel in front of your body. Pull gently on the front end of the towel. This will bring your hand farther up your back to stretch your shoulder. Overhead stretch   1. Standing about an arm's length away, grasp onto a solid surface. You could use a countertop, a doorknob, or the back of a sturdy chair. 2. With your knees slightly bent, bend forward with your arms straight. Lower your upper body, and let your shoulders stretch. 3. As your shoulders are able to stretch farther, you may need to take a step or two backward. 4. Hold for at least 15 to 30 seconds. Then stand up and relax. If you had stepped back during your stretch, step forward so you can keep your hands on the solid surface. 5. Repeat 2 to 4 times. Shoulder flexion (lying down)   To make a wand for this exercise, use a piece of PVC pipe or a broom handle with the broom removed. Make the wand about a foot wider than your shoulders. 1. Lie on your back, holding a wand with both hands. Your palms should face down as you hold the wand. 2. Keeping your elbows straight, slowly raise your arms over your head. Raise them until you feel a stretch in your shoulders, upper back, and chest.  3. Hold for 15 to 30 seconds. 4. Repeat 2 to 4 times. Shoulder rotation (lying down)   To make a wand for this exercise, use a piece of PVC pipe or a broom handle with the broom removed. Make the wand about a foot wider than your shoulders. 1. Lie on your back. Hold a wand with both hands with your elbows bent and palms up. 2. Keep your elbows close to your body, and move the wand across your body toward the sore arm. 3. Hold for 8 to 12 seconds.   4. Repeat 2 to 4 times. Wall climbing (to the side)   Avoid any movement that is straight to your side, and be careful not to arch your back. Your arm should stay about 30 degrees to the front of your side. 1. Stand with your side to a wall so that your fingers can just touch it at an angle about 30 degrees toward the front of your body. 2. Walk the fingers of your injured arm up the wall as high as pain permits. Try not to shrug your shoulder up toward your ear as you move your arm up. 3. Hold that position for a count of at least 15 to 20.  4. Walk your fingers back down to the starting position. 5. Repeat at least 2 to 4 times. Try to reach higher each time. Wall climbing (to the front)   During this stretching exercise, be careful not to arch your back. 1. Face a wall, and stand so your fingers can just touch it. 2. Keeping your shoulder down, walk the fingers of your injured arm up the wall as high as pain permits. (Don't shrug your shoulder up toward your ear.)  3. Hold your arm in that position for at least 15 to 30 seconds. 4. Slowly walk your fingers back down to where you started. 5. Repeat at least 2 to 4 times. Try to reach higher each time. Shoulder blade squeeze   1. Stand with your arms at your sides, and squeeze your shoulder blades together. Do not raise your shoulders up as you squeeze. 2. Hold 6 seconds. 3. Repeat 8 to 12 times. Scapular exercise: Arm reach   1. Lie flat on your back. This exercise is a very slight motion that starts with your arms raised (elbows straight, arms straight). 2. From this position, reach higher toward the bogdan or ceiling. Keep your elbows straight. All motion should be from your shoulder blade only. 3. Relax your arms back to where you started. 4. Repeat 8 to 12 times. Arm raise to the side   During this strengthening exercise, your arm should stay about 30 degrees to the front of your side.   1. Slowly raise your injured arm to the side, with your thumb facing up. Raise your arm 60 degrees at the most (shoulder level is 90 degrees). 2. Hold the position for 3 to 5 seconds. Then lower your arm back to your side. If you need to, bring your \"good\" arm across your body and place it under the elbow as you lower your injured arm. Use your good arm to keep your injured arm from dropping down too fast.  3. Repeat 8 to 12 times. 4. When you first start out, don't hold any extra weight in your hand. As you get stronger, you may use a 1-pound to 2-pound dumbbell or a small can of food. Shoulder flexor and extensor exercise   These are isometric exercises. That means you contract your muscles without actually moving. 1. Push forward (flex): Stand facing a wall or doorjamb, about 6 inches or less back. Hold your injured arm against your body. Make a closed fist with your thumb on top. Then gently push your hand forward into the wall with about 25% to 50% of your strength. Don't let your body move backward as you push. Hold for about 6 seconds. Relax for a few seconds. Repeat 8 to 12 times. 2. Push backward (extend): Stand with your back flat against a wall. Your upper arm should be against the wall, with your elbow bent 90 degrees (your hand straight ahead). Push your elbow gently back against the wall with about 25% to 50% of your strength. Don't let your body move forward as you push. Hold for about 6 seconds. Relax for a few seconds. Repeat 8 to 12 times. Scapular exercise: Wall push-ups   This exercise is best done with your fingers somewhat turned out, rather than straight up and down. 1. Stand facing a wall, about 12 inches to 18 inches away. 2. Place your hands on the wall at shoulder height. 3. Slowly bend your elbows and bring your face to the wall. Keep your back and hips straight. 4. Push back to where you started. 5. Repeat 8 to 12 times. 6. When you can do this exercise against a wall comfortably, you can try it against a counter.  You can then slowly progress to the end of a couch, then to a sturdy chair, and finally to the floor. Scapular exercise: Retraction   For this exercise, you will need elastic exercise material, such as surgical tubing or Thera-Band. 1. Put the band around a solid object at about waist level. (A bedpost will work well.) Each hand should hold an end of the band. 2. With your elbows at your sides and bent to 90 degrees, pull the band back. Your shoulder blades should move toward each other. Then move your arms back where you started. 3. Repeat 8 to 12 times. 4. If you have good range of motion in your shoulders, try this exercise with your arms lifted out to the sides. Keep your elbows at a 90-degree angle. Raise the elastic band up to about shoulder level. Pull the band back to move your shoulder blades toward each other. Then move your arms back where you started. Internal rotator strengthening exercise   1. Start by tying a piece of elastic exercise material to a doorknob. You can use surgical tubing or Thera-Band. 2. Stand or sit with your shoulder relaxed and your elbow bent 90 degrees. Your upper arm should rest comfortably against your side. Squeeze a rolled towel between your elbow and your body for comfort. This will help keep your arm at your side. 3. Hold one end of the elastic band in the hand of the painful arm. 4. Slowly rotate your forearm toward your body until it touches your belly. Slowly move it back to where you started. 5. Keep your elbow and upper arm firmly tucked against the towel roll or at your side. 6. Repeat 8 to 12 times. External rotator strengthening exercise   1. Start by tying a piece of elastic exercise material to a doorknob. You can use surgical tubing or Thera-Band. (You may also hold one end of the band in each hand.)  2. Stand or sit with your shoulder relaxed and your elbow bent 90 degrees. Your upper arm should rest comfortably against your side.  Squeeze a rolled towel between your elbow and your body for comfort. This will help keep your arm at your side. 3. Hold one end of the elastic band with the hand of the painful arm. 4. Start with your forearm across your belly. Slowly rotate the forearm out away from your body. Keep your elbow and upper arm tucked against the towel roll or the side of your body until you begin to feel tightness in your shoulder. Slowly move your arm back to where you started. 5. Repeat 8 to 12 times. Follow-up care is a key part of your treatment and safety. Be sure to make and go to all appointments, and call your doctor if you are having problems. It's also a good idea to know your test results and keep a list of the medicines you take. Where can you learn more? Go to https://[email or web address].LikeMe.Net. org and sign in to your [facility] account. Enter Jerad  in the [employer] box to learn more about \"Rotator Cuff: Exercises. \"     If you do not have an account, please click on the \"Sign Up Now\" link. Current as of: November 16, 2020               Content Version: 12.8  © 7400-2265 [employer]. Care instructions adapted under license by [facility] ([facility]). If you have questions about a medical condition or this instruction, always ask your healthcare professional. [address] any warranty or liability for your use of this information. Rotator Cuff: Exercises  Introduction  Here are some examples of exercises for you to try. The exercises may be suggested for a condition or for rehabilitation. Start each exercise slowly. Ease off the exercises if you start to have pain. You will be told when to start these exercises and which ones will work best for you. How to do the exercises      Pendulum swing   If you have pain in your back, do not do this exercise. 5. Hold on to a table or the back of a chair with your good arm. Then bend forward a little and let your sore arm hang straight down.  This exercise does not use the arm muscles. Rather, use your legs and your hips to create movement that makes your arm swing freely. 6. Use the movement from your hips and legs to guide the slightly swinging arm back and forth like a pendulum (or elephant trunk). Then guide it in circles that start small (about the size of a dinner plate). Make the circles a bit larger each day, as your pain allows. 7. Do this exercise for 5 minutes, 5 to 7 times each day. 8. As you have less pain, try bending over a little farther to do this exercise. This will increase the amount of movement at your shoulder. Posterior stretching exercise   5. Hold the elbow of your injured arm with your other hand. 6. Use your hand to pull your injured arm gently up and across your body. You will feel a gentle stretch across the back of your injured shoulder. 7. Hold for at least 15 to 30 seconds. Then slowly lower your arm. 8. Repeat 2 to 4 times. Up-the-back stretch   Your doctor or physical therapist may want you to wait to do this stretch until you have regained most of your range of motion and strength. You can do this stretch in different ways. Hold any of these stretches for at least 15 to 30 seconds. Repeat them 2 to 4 times. 4. Light stretch: Put your hand in your back pocket. Let it rest there to stretch your shoulder. 5. Moderate stretch: With your other hand, hold your injured arm (palm outward) behind your back by the wrist. Pull your arm up gently to stretch your shoulder. 6. Advanced stretch: Put a towel over your other shoulder. Put the hand of your injured arm behind your back. Now hold the back end of the towel. With the other hand, hold the front end of the towel in front of your body. Pull gently on the front end of the towel. This will bring your hand farther up your back to stretch your shoulder. Overhead stretch   6. Standing about an arm's length away, grasp onto a solid surface.  You could use a countertop, a doorknob, or the back of a sturdy chair. 7. With your knees slightly bent, bend forward with your arms straight. Lower your upper body, and let your shoulders stretch. 8. As your shoulders are able to stretch farther, you may need to take a step or two backward. 9. Hold for at least 15 to 30 seconds. Then stand up and relax. If you had stepped back during your stretch, step forward so you can keep your hands on the solid surface. 10. Repeat 2 to 4 times. Shoulder flexion (lying down)   To make a wand for this exercise, use a piece of PVC pipe or a broom handle with the broom removed. Make the wand about a foot wider than your shoulders. 5. Lie on your back, holding a wand with both hands. Your palms should face down as you hold the wand. 6. Keeping your elbows straight, slowly raise your arms over your head. Raise them until you feel a stretch in your shoulders, upper back, and chest.  7. Hold for 15 to 30 seconds. 8. Repeat 2 to 4 times. Shoulder rotation (lying down)   To make a wand for this exercise, use a piece of PVC pipe or a broom handle with the broom removed. Make the wand about a foot wider than your shoulders. 5. Lie on your back. Hold a wand with both hands with your elbows bent and palms up. 6. Keep your elbows close to your body, and move the wand across your body toward the sore arm. 7. Hold for 8 to 12 seconds. 8. Repeat 2 to 4 times. Wall climbing (to the side)   Avoid any movement that is straight to your side, and be careful not to arch your back. Your arm should stay about 30 degrees to the front of your side. 6. Stand with your side to a wall so that your fingers can just touch it at an angle about 30 degrees toward the front of your body. 7. Walk the fingers of your injured arm up the wall as high as pain permits. Try not to shrug your shoulder up toward your ear as you move your arm up.   8. Hold that position for a count of at least 15 to 20.  9. Walk your fingers back down to the starting position. 10. Repeat at least 2 to 4 times. Try to reach higher each time. Wall climbing (to the front)   During this stretching exercise, be careful not to arch your back. 6. Face a wall, and stand so your fingers can just touch it. 7. Keeping your shoulder down, walk the fingers of your injured arm up the wall as high as pain permits. (Don't shrug your shoulder up toward your ear.)  8. Hold your arm in that position for at least 15 to 30 seconds. 9. Slowly walk your fingers back down to where you started. 10. Repeat at least 2 to 4 times. Try to reach higher each time. Shoulder blade squeeze   4. Stand with your arms at your sides, and squeeze your shoulder blades together. Do not raise your shoulders up as you squeeze. 5. Hold 6 seconds. 6. Repeat 8 to 12 times. Scapular exercise: Arm reach   5. Lie flat on your back. This exercise is a very slight motion that starts with your arms raised (elbows straight, arms straight). 6. From this position, reach higher toward the bogdan or ceiling. Keep your elbows straight. All motion should be from your shoulder blade only. 7. Relax your arms back to where you started. 8. Repeat 8 to 12 times. Arm raise to the side   During this strengthening exercise, your arm should stay about 30 degrees to the front of your side. 5. Slowly raise your injured arm to the side, with your thumb facing up. Raise your arm 60 degrees at the most (shoulder level is 90 degrees). 6. Hold the position for 3 to 5 seconds. Then lower your arm back to your side. If you need to, bring your \"good\" arm across your body and place it under the elbow as you lower your injured arm. Use your good arm to keep your injured arm from dropping down too fast.  7. Repeat 8 to 12 times. 8. When you first start out, don't hold any extra weight in your hand. As you get stronger, you may use a 1-pound to 2-pound dumbbell or a small can of food.     Shoulder flexor and extensor exercise   These are isometric exercises. That means you contract your muscles without actually moving. 3. Push forward (flex): Stand facing a wall or doorjamb, about 6 inches or less back. Hold your injured arm against your body. Make a closed fist with your thumb on top. Then gently push your hand forward into the wall with about 25% to 50% of your strength. Don't let your body move backward as you push. Hold for about 6 seconds. Relax for a few seconds. Repeat 8 to 12 times. 4. Push backward (extend): Stand with your back flat against a wall. Your upper arm should be against the wall, with your elbow bent 90 degrees (your hand straight ahead). Push your elbow gently back against the wall with about 25% to 50% of your strength. Don't let your body move forward as you push. Hold for about 6 seconds. Relax for a few seconds. Repeat 8 to 12 times. Scapular exercise: Wall push-ups   This exercise is best done with your fingers somewhat turned out, rather than straight up and down. 7. Stand facing a wall, about 12 inches to 18 inches away. 8. Place your hands on the wall at shoulder height. 9. Slowly bend your elbows and bring your face to the wall. Keep your back and hips straight. 10. Push back to where you started. 11. Repeat 8 to 12 times. 12. When you can do this exercise against a wall comfortably, you can try it against a counter. You can then slowly progress to the end of a couch, then to a sturdy chair, and finally to the floor. Scapular exercise: Retraction   For this exercise, you will need elastic exercise material, such as surgical tubing or Thera-Band. 5. Put the band around a solid object at about waist level. (A bedpost will work well.) Each hand should hold an end of the band. 6. With your elbows at your sides and bent to 90 degrees, pull the band back. Your shoulder blades should move toward each other. Then move your arms back where you started. 7. Repeat 8 to 12 times.   8. If you have good range of motion in your shoulders, try this exercise with your arms lifted out to the sides. Keep your elbows at a 90-degree angle. Raise the elastic band up to about shoulder level. Pull the band back to move your shoulder blades toward each other. Then move your arms back where you started. Internal rotator strengthening exercise   7. Start by tying a piece of elastic exercise material to a doorknob. You can use surgical tubing or Thera-Band. 8. Stand or sit with your shoulder relaxed and your elbow bent 90 degrees. Your upper arm should rest comfortably against your side. Squeeze a rolled towel between your elbow and your body for comfort. This will help keep your arm at your side. 9. Hold one end of the elastic band in the hand of the painful arm. 10. Slowly rotate your forearm toward your body until it touches your belly. Slowly move it back to where you started. 11. Keep your elbow and upper arm firmly tucked against the towel roll or at your side. 12. Repeat 8 to 12 times. External rotator strengthening exercise   6. Start by tying a piece of elastic exercise material to a doorknob. You can use surgical tubing or Thera-Band. (You may also hold one end of the band in each hand.)  7. Stand or sit with your shoulder relaxed and your elbow bent 90 degrees. Your upper arm should rest comfortably against your side. Squeeze a rolled towel between your elbow and your body for comfort. This will help keep your arm at your side. 8. Hold one end of the elastic band with the hand of the painful arm. 9. Start with your forearm across your belly. Slowly rotate the forearm out away from your body. Keep your elbow and upper arm tucked against the towel roll or the side of your body until you begin to feel tightness in your shoulder. Slowly move your arm back to where you started. 10. Repeat 8 to 12 times. Follow-up care is a key part of your treatment and safety.  Be sure to make and go to all appointments, and call your doctor if you are having problems. It's also a good idea to know your test results and keep a list of the medicines you take. Where can you learn more? Go to https://chpeazeem.[employer]. org and sign in to your [employer] account. Enter Megan Issa in the [facility] box to learn more about \"Rotator Cuff: Exercises. \"     If you do not have an account, please click on the \"Sign Up Now\" link. Current as of: November 16, 2020               Content Version: 12.8  © 4647-8311 [employer]. Care instructions adapted under license by [facility] ([facility]). If you have questions about a medical condition or this instruction, always ask your healthcare professional. [address] any warranty or liability for your use of this information. Rotator Cuff: Exercises  Introduction  Here are some examples of exercises for you to try. The exercises may be suggested for a condition or for rehabilitation. Start each exercise slowly. Ease off the exercises if you start to have pain. You will be told when to start these exercises and which ones will work best for you. How to do the exercises      Pendulum swing   If you have pain in your back, do not do this exercise. 9. Hold on to a table or the back of a chair with your good arm. Then bend forward a little and let your sore arm hang straight down. This exercise does not use the arm muscles. Rather, use your legs and your hips to create movement that makes your arm swing freely. 10. Use the movement from your hips and legs to guide the slightly swinging arm back and forth like a pendulum (or elephant trunk). Then guide it in circles that start small (about the size of a dinner plate). Make the circles a bit larger each day, as your pain allows. 11. Do this exercise for 5 minutes, 5 to 7 times each day. 12. As you have less pain, try bending over a little farther to do this exercise.  This will increase the amount of movement at your shoulder. Posterior stretching exercise   9. Hold the elbow of your injured arm with your other hand. 10. Use your hand to pull your injured arm gently up and across your body. You will feel a gentle stretch across the back of your injured shoulder. 11. Hold for at least 15 to 30 seconds. Then slowly lower your arm. 12. Repeat 2 to 4 times. Up-the-back stretch   Your doctor or physical therapist may want you to wait to do this stretch until you have regained most of your range of motion and strength. You can do this stretch in different ways. Hold any of these stretches for at least 15 to 30 seconds. Repeat them 2 to 4 times. 7. Light stretch: Put your hand in your back pocket. Let it rest there to stretch your shoulder. 8. Moderate stretch: With your other hand, hold your injured arm (palm outward) behind your back by the wrist. Pull your arm up gently to stretch your shoulder. 9. Advanced stretch: Put a towel over your other shoulder. Put the hand of your injured arm behind your back. Now hold the back end of the towel. With the other hand, hold the front end of the towel in front of your body. Pull gently on the front end of the towel. This will bring your hand farther up your back to stretch your shoulder. Overhead stretch   11. Standing about an arm's length away, grasp onto a solid surface. You could use a countertop, a doorknob, or the back of a sturdy chair. 12. With your knees slightly bent, bend forward with your arms straight. Lower your upper body, and let your shoulders stretch. 13. As your shoulders are able to stretch farther, you may need to take a step or two backward. 14. Hold for at least 15 to 30 seconds. Then stand up and relax. If you had stepped back during your stretch, step forward so you can keep your hands on the solid surface. 15. Repeat 2 to 4 times.     Shoulder flexion (lying down)   To make a wand for this exercise, use a piece of PVC pipe or a broom handle with the broom removed. Make the wand about a foot wider than your shoulders. 9. Lie on your back, holding a wand with both hands. Your palms should face down as you hold the wand. 10. Keeping your elbows straight, slowly raise your arms over your head. Raise them until you feel a stretch in your shoulders, upper back, and chest.  11. Hold for 15 to 30 seconds. 12. Repeat 2 to 4 times. Shoulder rotation (lying down)   To make a wand for this exercise, use a piece of PVC pipe or a broom handle with the broom removed. Make the wand about a foot wider than your shoulders. 9. Lie on your back. Hold a wand with both hands with your elbows bent and palms up. 10. Keep your elbows close to your body, and move the wand across your body toward the sore arm. 11. Hold for 8 to 12 seconds. 12. Repeat 2 to 4 times. Wall climbing (to the side)   Avoid any movement that is straight to your side, and be careful not to arch your back. Your arm should stay about 30 degrees to the front of your side. 11. Stand with your side to a wall so that your fingers can just touch it at an angle about 30 degrees toward the front of your body. 12. Walk the fingers of your injured arm up the wall as high as pain permits. Try not to shrug your shoulder up toward your ear as you move your arm up. 13. Hold that position for a count of at least 15 to 20.  14. Walk your fingers back down to the starting position. 15. Repeat at least 2 to 4 times. Try to reach higher each time. Wall climbing (to the front)   During this stretching exercise, be careful not to arch your back. 11. Face a wall, and stand so your fingers can just touch it. 12. Keeping your shoulder down, walk the fingers of your injured arm up the wall as high as pain permits. (Don't shrug your shoulder up toward your ear.)  13. Hold your arm in that position for at least 15 to 30 seconds.   14. Slowly walk your fingers back down to where you started. 15. Repeat at least 2 to 4 times. Try to reach higher each time. Shoulder blade squeeze   7. Stand with your arms at your sides, and squeeze your shoulder blades together. Do not raise your shoulders up as you squeeze. 8. Hold 6 seconds. 9. Repeat 8 to 12 times. Scapular exercise: Arm reach   9. Lie flat on your back. This exercise is a very slight motion that starts with your arms raised (elbows straight, arms straight). 10. From this position, reach higher toward the bogdan or ceiling. Keep your elbows straight. All motion should be from your shoulder blade only. 11. Relax your arms back to where you started. 12. Repeat 8 to 12 times. Arm raise to the side   During this strengthening exercise, your arm should stay about 30 degrees to the front of your side. 9. Slowly raise your injured arm to the side, with your thumb facing up. Raise your arm 60 degrees at the most (shoulder level is 90 degrees). 10. Hold the position for 3 to 5 seconds. Then lower your arm back to your side. If you need to, bring your \"good\" arm across your body and place it under the elbow as you lower your injured arm. Use your good arm to keep your injured arm from dropping down too fast.  11. Repeat 8 to 12 times. 12. When you first start out, don't hold any extra weight in your hand. As you get stronger, you may use a 1-pound to 2-pound dumbbell or a small can of food. Shoulder flexor and extensor exercise   These are isometric exercises. That means you contract your muscles without actually moving. 5. Push forward (flex): Stand facing a wall or doorjamb, about 6 inches or less back. Hold your injured arm against your body. Make a closed fist with your thumb on top. Then gently push your hand forward into the wall with about 25% to 50% of your strength. Don't let your body move backward as you push. Hold for about 6 seconds. Relax for a few seconds. Repeat 8 to 12 times.   6. Push backward (extend): Stand with your back flat against a wall. Your upper arm should be against the wall, with your elbow bent 90 degrees (your hand straight ahead). Push your elbow gently back against the wall with about 25% to 50% of your strength. Don't let your body move forward as you push. Hold for about 6 seconds. Relax for a few seconds. Repeat 8 to 12 times. Scapular exercise: Wall push-ups   This exercise is best done with your fingers somewhat turned out, rather than straight up and down. 13. Stand facing a wall, about 12 inches to 18 inches away. 14. Place your hands on the wall at shoulder height. 15. Slowly bend your elbows and bring your face to the wall. Keep your back and hips straight. 16. Push back to where you started. 17. Repeat 8 to 12 times. 18. When you can do this exercise against a wall comfortably, you can try it against a counter. You can then slowly progress to the end of a couch, then to a sturdy chair, and finally to the floor. Scapular exercise: Retraction   For this exercise, you will need elastic exercise material, such as surgical tubing or Thera-Band. 9. Put the band around a solid object at about waist level. (A bedpost will work well.) Each hand should hold an end of the band. 10. With your elbows at your sides and bent to 90 degrees, pull the band back. Your shoulder blades should move toward each other. Then move your arms back where you started. 11. Repeat 8 to 12 times. 12. If you have good range of motion in your shoulders, try this exercise with your arms lifted out to the sides. Keep your elbows at a 90-degree angle. Raise the elastic band up to about shoulder level. Pull the band back to move your shoulder blades toward each other. Then move your arms back where you started. Internal rotator strengthening exercise   13. Start by tying a piece of elastic exercise material to a doorknob. You can use surgical tubing or Thera-Band.   14. Stand or sit with your shoulder relaxed and your elbow bent 90 degrees. Your upper arm should rest comfortably against your side. Squeeze a rolled towel between your elbow and your body for comfort. This will help keep your arm at your side. 15. Hold one end of the elastic band in the hand of the painful arm. 16. Slowly rotate your forearm toward your body until it touches your belly. Slowly move it back to where you started. 17. Keep your elbow and upper arm firmly tucked against the towel roll or at your side. 18. Repeat 8 to 12 times. External rotator strengthening exercise   11. Start by tying a piece of elastic exercise material to a doorknob. You can use surgical tubing or Thera-Band. (You may also hold one end of the band in each hand.)  12. Stand or sit with your shoulder relaxed and your elbow bent 90 degrees. Your upper arm should rest comfortably against your side. Squeeze a rolled towel between your elbow and your body for comfort. This will help keep your arm at your side. 13. Hold one end of the elastic band with the hand of the painful arm. 15. Start with your forearm across your belly. Slowly rotate the forearm out away from your body. Keep your elbow and upper arm tucked against the towel roll or the side of your body until you begin to feel tightness in your shoulder. Slowly move your arm back to where you started. 15. Repeat 8 to 12 times. Follow-up care is a key part of your treatment and safety. Be sure to make and go to all appointments, and call your doctor if you are having problems. It's also a good idea to know your test results and keep a list of the medicines you take. Where can you learn more? Go to https://Hullvivian[employer]felipe.[employer]. org and sign in to your [employer] account. Enter Milan Fortune in the [employer] box to learn more about \"Rotator Cuff: Exercises. \"     If you do not have an account, please click on the \"Sign Up Now\" link.   Current as of: November 16, 2020               Content Version: 12.8  © 5754-6720 Healthwise, Incorporated. Care instructions adapted under license by [facility] ([facility]). If you have questions about a medical condition or this instruction, always ask your healthcare professional. [address] any warranty or liability for your use of this information. Rotator Cuff: Exercises  Introduction  Here are some examples of exercises for you to try. The exercises may be suggested for a condition or for rehabilitation. Start each exercise slowly. Ease off the exercises if you start to have pain. You will be told when to start these exercises and which ones will work best for you. How to do the exercises      Pendulum swing   If you have pain in your back, do not do this exercise. 13. Hold on to a table or the back of a chair with your good arm. Then bend forward a little and let your sore arm hang straight down. This exercise does not use the arm muscles. Rather, use your legs and your hips to create movement that makes your arm swing freely. 14. Use the movement from your hips and legs to guide the slightly swinging arm back and forth like a pendulum (or elephant trunk). Then guide it in circles that start small (about the size of a dinner plate). Make the circles a bit larger each day, as your pain allows. 15. Do this exercise for 5 minutes, 5 to 7 times each day. 16. As you have less pain, try bending over a little farther to do this exercise. This will increase the amount of movement at your shoulder. Posterior stretching exercise   13. Hold the elbow of your injured arm with your other hand. 14. Use your hand to pull your injured arm gently up and across your body. You will feel a gentle stretch across the back of your injured shoulder. 15. Hold for at least 15 to 30 seconds. Then slowly lower your arm. 16. Repeat 2 to 4 times.     Up-the-back stretch   Your doctor or physical therapist may want you to wait to do this stretch until you have regained most of your range of motion and strength. You can do this stretch in different ways. Hold any of these stretches for at least 15 to 30 seconds. Repeat them 2 to 4 times. 10. Light stretch: Put your hand in your back pocket. Let it rest there to stretch your shoulder. 11. Moderate stretch: With your other hand, hold your injured arm (palm outward) behind your back by the wrist. Pull your arm up gently to stretch your shoulder. 12. Advanced stretch: Put a towel over your other shoulder. Put the hand of your injured arm behind your back. Now hold the back end of the towel. With the other hand, hold the front end of the towel in front of your body. Pull gently on the front end of the towel. This will bring your hand farther up your back to stretch your shoulder. Overhead stretch   16. Standing about an arm's length away, grasp onto a solid surface. You could use a countertop, a doorknob, or the back of a sturdy chair. 17. With your knees slightly bent, bend forward with your arms straight. Lower your upper body, and let your shoulders stretch. 18. As your shoulders are able to stretch farther, you may need to take a step or two backward. 19. Hold for at least 15 to 30 seconds. Then stand up and relax. If you had stepped back during your stretch, step forward so you can keep your hands on the solid surface. 20. Repeat 2 to 4 times. Shoulder flexion (lying down)   To make a wand for this exercise, use a piece of PVC pipe or a broom handle with the broom removed. Make the wand about a foot wider than your shoulders. 13. Lie on your back, holding a wand with both hands. Your palms should face down as you hold the wand. 14. Keeping your elbows straight, slowly raise your arms over your head. Raise them until you feel a stretch in your shoulders, upper back, and chest.  15. Hold for 15 to 30 seconds. 16. Repeat 2 to 4 times.     Shoulder rotation (lying down)   To make a wand for this exercise, use a piece of PVC pipe or a broom handle with the broom removed. Make the wand about a foot wider than your shoulders. 15. Lie on your back. Hold a wand with both hands with your elbows bent and palms up. 14. Keep your elbows close to your body, and move the wand across your body toward the sore arm. 15. Hold for 8 to 12 seconds. 16. Repeat 2 to 4 times. Wall climbing (to the side)   Avoid any movement that is straight to your side, and be careful not to arch your back. Your arm should stay about 30 degrees to the front of your side. 16. Stand with your side to a wall so that your fingers can just touch it at an angle about 30 degrees toward the front of your body. 17. Walk the fingers of your injured arm up the wall as high as pain permits. Try not to shrug your shoulder up toward your ear as you move your arm up. 18. Hold that position for a count of at least 15 to 20.  19. Walk your fingers back down to the starting position. 20. Repeat at least 2 to 4 times. Try to reach higher each time. Wall climbing (to the front)   During this stretching exercise, be careful not to arch your back. 16. Face a wall, and stand so your fingers can just touch it. 17. Keeping your shoulder down, walk the fingers of your injured arm up the wall as high as pain permits. (Don't shrug your shoulder up toward your ear.)  18. Hold your arm in that position for at least 15 to 30 seconds. 19. Slowly walk your fingers back down to where you started. 20. Repeat at least 2 to 4 times. Try to reach higher each time. Shoulder blade squeeze   10. Stand with your arms at your sides, and squeeze your shoulder blades together. Do not raise your shoulders up as you squeeze. 11. Hold 6 seconds. 12. Repeat 8 to 12 times. Scapular exercise: Arm reach   13. Lie flat on your back. This exercise is a very slight motion that starts with your arms raised (elbows straight, arms straight).   14. From this position, reach higher toward the bogdan or ceiling. Keep your elbows straight. All motion should be from your shoulder blade only. 15. Relax your arms back to where you started. 16. Repeat 8 to 12 times. Arm raise to the side   During this strengthening exercise, your arm should stay about 30 degrees to the front of your side. 13. Slowly raise your injured arm to the side, with your thumb facing up. Raise your arm 60 degrees at the most (shoulder level is 90 degrees). 14. Hold the position for 3 to 5 seconds. Then lower your arm back to your side. If you need to, bring your \"good\" arm across your body and place it under the elbow as you lower your injured arm. Use your good arm to keep your injured arm from dropping down too fast.  15. Repeat 8 to 12 times. 16. When you first start out, don't hold any extra weight in your hand. As you get stronger, you may use a 1-pound to 2-pound dumbbell or a small can of food. Shoulder flexor and extensor exercise   These are isometric exercises. That means you contract your muscles without actually moving. 7. Push forward (flex): Stand facing a wall or doorjamb, about 6 inches or less back. Hold your injured arm against your body. Make a closed fist with your thumb on top. Then gently push your hand forward into the wall with about 25% to 50% of your strength. Don't let your body move backward as you push. Hold for about 6 seconds. Relax for a few seconds. Repeat 8 to 12 times. 8. Push backward (extend): Stand with your back flat against a wall. Your upper arm should be against the wall, with your elbow bent 90 degrees (your hand straight ahead). Push your elbow gently back against the wall with about 25% to 50% of your strength. Don't let your body move forward as you push. Hold for about 6 seconds. Relax for a few seconds. Repeat 8 to 12 times. Scapular exercise: Wall push-ups   This exercise is best done with your fingers somewhat turned out, rather than straight up and down.   19. Stand facing a wall, about 12 inches to 18 inches away. 20. Place your hands on the wall at shoulder height. 21. Slowly bend your elbows and bring your face to the wall. Keep your back and hips straight. 22. Push back to where you started. 23. Repeat 8 to 12 times. 24. When you can do this exercise against a wall comfortably, you can try it against a counter. You can then slowly progress to the end of a couch, then to a sturdy chair, and finally to the floor. Scapular exercise: Retraction   For this exercise, you will need elastic exercise material, such as surgical tubing or Thera-Band. 13. Put the band around a solid object at about waist level. (A bedpost will work well.) Each hand should hold an end of the band. 14. With your elbows at your sides and bent to 90 degrees, pull the band back. Your shoulder blades should move toward each other. Then move your arms back where you started. 15. Repeat 8 to 12 times. 16. If you have good range of motion in your shoulders, try this exercise with your arms lifted out to the sides. Keep your elbows at a 90-degree angle. Raise the elastic band up to about shoulder level. Pull the band back to move your shoulder blades toward each other. Then move your arms back where you started. Internal rotator strengthening exercise   19. Start by tying a piece of elastic exercise material to a doorknob. You can use surgical tubing or Thera-Band. 20. Stand or sit with your shoulder relaxed and your elbow bent 90 degrees. Your upper arm should rest comfortably against your side. Squeeze a rolled towel between your elbow and your body for comfort. This will help keep your arm at your side. 21. Hold one end of the elastic band in the hand of the painful arm. 22. Slowly rotate your forearm toward your body until it touches your belly. Slowly move it back to where you started. 23. Keep your elbow and upper arm firmly tucked against the towel roll or at your side. 24. Repeat 8 to 12 times. External rotator strengthening exercise   16. Start by tying a piece of elastic exercise material to a doorknob. You can use surgical tubing or Thera-Band. (You may also hold one end of the band in each hand.)  17. Stand or sit with your shoulder relaxed and your elbow bent 90 degrees. Your upper arm should rest comfortably against your side. Squeeze a rolled towel between your elbow and your body for comfort. This will help keep your arm at your side. 18. Hold one end of the elastic band with the hand of the painful arm. 23. Start with your forearm across your belly. Slowly rotate the forearm out away from your body. Keep your elbow and upper arm tucked against the towel roll or the side of your body until you begin to feel tightness in your shoulder. Slowly move your arm back to where you started. 20. Repeat 8 to 12 times. Follow-up care is a key part of your treatment and safety. Be sure to make and go to all appointments, and call your doctor if you are having problems. It's also a good idea to know your test results and keep a list of the medicines you take. Where can you learn more? Go to https://[employer]. org and sign in to your [employer] account. Enter Reyes Rondo in the [facility] box to learn more about \"Rotator Cuff: Exercises. \"     If you do not have an account, please click on the \"Sign Up Now\" link. Current as of: November 16, 2020               Content Version: 12.8  © 2006-2021 Healthwise, Incorporated. Care instructions adapted under license by [facility] ([facility]). If you have questions about a medical condition or this instruction, always ask your healthcare professional. [place] [address] any warranty or liability for your use of this information.

## 2022-04-12 ENCOUNTER — OFFICE VISIT (OUTPATIENT)
Dept: FAMILY MEDICINE CLINIC | Age: 57
End: 2022-04-12
Payer: COMMERCIAL

## 2022-04-12 VITALS
HEART RATE: 86 BPM | BODY MASS INDEX: 55.15 KG/M2 | OXYGEN SATURATION: 97 % | TEMPERATURE: 97.2 F | SYSTOLIC BLOOD PRESSURE: 134 MMHG | WEIGHT: 315 LBS | DIASTOLIC BLOOD PRESSURE: 66 MMHG

## 2022-04-12 DIAGNOSIS — M79.672 LEFT FOOT PAIN: Primary | ICD-10-CM

## 2022-04-12 DIAGNOSIS — M54.50 ACUTE RIGHT-SIDED LOW BACK PAIN WITHOUT SCIATICA: ICD-10-CM

## 2022-04-12 DIAGNOSIS — S80.211A ABRASION, RIGHT KNEE, INITIAL ENCOUNTER: ICD-10-CM

## 2022-04-12 DIAGNOSIS — S89.91XA INJURY OF RIGHT KNEE, INITIAL ENCOUNTER: ICD-10-CM

## 2022-04-12 PROCEDURE — 99214 OFFICE O/P EST MOD 30 MIN: CPT | Performed by: PHYSICIAN ASSISTANT

## 2022-04-12 PROCEDURE — G8417 CALC BMI ABV UP PARAM F/U: HCPCS | Performed by: PHYSICIAN ASSISTANT

## 2022-04-12 PROCEDURE — G8427 DOCREV CUR MEDS BY ELIG CLIN: HCPCS | Performed by: PHYSICIAN ASSISTANT

## 2022-04-12 PROCEDURE — 1036F TOBACCO NON-USER: CPT | Performed by: PHYSICIAN ASSISTANT

## 2022-04-12 PROCEDURE — 3017F COLORECTAL CA SCREEN DOC REV: CPT | Performed by: PHYSICIAN ASSISTANT

## 2022-04-12 NOTE — PROGRESS NOTES
22  Nick Parekh : 1965 Sex: male  Age 64 y.o. Subjective:  Chief Complaint   Patient presents with    Back Injury     fell this am 645     Foot Injury     L foot     Knee Injury     R knee          HPI:   Nick Parekh , 64 y.o. male presents to [facility] care for evaluation of fall, left foot pain    HPI   22-year-old male presents to [facility] for evaluation of left foot pain status post fall. Patient states that he slipped and fell and went down in the grass area. The thing that is bothering him the most is the left great toe and the first MTP joint area. The patient states that he did get some minimal abrasions to the right knee. He minimally hit the right side of his back. He is not really having much pain or discomfort in these areas. He also caught himself with his arms and they are a little bit sore but not really having much pain. The only thing that seems to be bothering him is the left foot. There is no head injury. This was a mechanical fall. ROS:   Unless otherwise stated in this report the patient's positive and negative responses for review of systems for constitutional, eyes, ENT, cardiovascular, respiratory, gastrointestinal, neurological, , musculoskeletal, and integument systems and related systems to the presenting problem are either stated in the history of present illness or were not pertinent or were negative for the symptoms and/or complaints related to the presenting medical problem. Positives and pertinent negatives as per HPI. All others reviewed and are negative.       PMH:     Past Medical History:   Diagnosis Date    Asthma     Diabetes mellitus ([facility]     Hypertension     Prolonged emergence from general anesthesia        Past Surgical History:   Procedure Laterality Date    APPENDECTOMY      COLONOSCOPY N/A 4/15/2021    COLONOSCOPY WITH BIOPSY performed by Rivera Hunter DO at [address] [facility] TONSILLECTOMY      UPPER GASTROINTESTINAL ENDOSCOPY N/A 4/15/2021    EGD ESOPHAGOGASTRODUODENOSCOPY DILATATION performed by Leandra Suárez DO at [facility] ENDOSCOPY       Family History   Problem Relation Age of Onset   Laz Favorite Cancer Mother     Diabetes Mother        Medications:     Current Outpatient Medications:     BENZOYL PEROXIDE 10 % external wash, USE TO [facility] ONCE DAILY AS DIRECTED, Disp: , Rfl:     clindamycin (CLEOCIN T) 1 % lotion, APPLY TO AFFECTED AREA DAILY AS DIRECTED, Disp: , Rfl:     predniSONE (DELTASONE) 5 MG tablet, 4 tablets daily for 3 days then 3 tablets daily for 3 days then 2 tablets daily for 3 days then 1 tablet daily for 3 days, Disp: 30 tablet, Rfl: 0    fluticasone (FLONASE) 50 MCG/ACT nasal spray, 2 sprays by Each Nostril route daily, Disp: 48 g, Rfl: 1    azelastine (ASTELIN) 0.1 % nasal spray, 1 spray by Nasal route 2 times daily Use in each nostril as directed, Disp: 90 mL, Rfl: 1    pantoprazole (PROTONIX) 20 MG tablet, TAKE 1 TABLET BY MOUTH ONCE DAILY, Disp: , Rfl:     lisinopril (PRINIVIL;ZESTRIL) 40 MG tablet, Take 1/2 (one-half) tablet by mouth twice daily, Disp: 90 tablet, Rfl: 1    pioglitazone (ACTOS) 30 MG tablet, Take 1/2 (one-half) tablet by mouth once daily, Disp: 45 tablet, Rfl: 5    clotrimazole-betamethasone (LOTRISONE) 1-0.05 % cream, Apply topically 2 times daily. 45 g, Disp: 45 g, Rfl: 0    fexofenadine (ALLEGRA) 180 MG tablet, Take 180 mg by mouth daily, Disp: , Rfl:     albuterol sulfate  (90 Base) MCG/ACT inhaler, Inhale 2 puffs into the lungs every 6 hours as needed for Wheezing, Disp: , Rfl:     montelukast (SINGULAIR) 10 MG tablet, Take 10 mg by mouth nightly., Disp: , Rfl:     albuterol (PROVENTIL) (2.5 MG/3ML) 0.083% nebulizer solution, Take 2.5 mg by nebulization every 6 hours as needed. , Disp: , Rfl:     Allergies:      Allergies   Allergen Reactions    Ciprofloxacin     Levofloxacin Other (See Comments)     pain    Penicillins Hives    or to the hindfoot. There is no pain with calcaneal squeeze. The patient had no palpable pain to the remainder of the digit. No significant reproducible pain to the upper extremities. Neurological: A&O x4, normal speech      Testing:     XR FOOT LEFT (MIN 3 VIEWS)    Result Date: 4/12/2022  EXAMINATION: THREE XRAY VIEWS OF THE LEFT FOOT 4/12/2022 9:15 am COMPARISON: None HISTORY: ORDERING SYSTEM PROVIDED HISTORY: Left foot pain TECHNOLOGIST PROVIDED HISTORY: Reason for exam:->left foot pain, first MTP joint FINDINGS: Three views of the left foot reveal minimal degenerative changes seen of the 1st metatarsophalangeal joint. There is calcaneal spurring. Degenerative changes seen within the midfoot. The cortex is intact. No acute bony abnormality. Medical Decision Making:     Vital signs reviewed    Past medical history reviewed. Allergies reviewed. Medications reviewed. Patient on arrival does not appear to be in any apparent distress or discomfort. The patient has been seen and evaluated. The patient does not appear to be toxic or lethargic. The patient will be sent for x-rays of the left foot. We did discuss doing x-rays of the right knee, the lumbar spine. The patient declined at this time. X-rays of the left foot did not show any evidence of acute fracture, dislocation. We discussed this with the patient. The patient did not want postop shoe. We discussed the potential of occult fracture. The patient really did not want further imaging of the other areas. The patient was comfortable with the plan. The patient will use Motrin, Tylenol, ice. The patient is to return to express care or go directly to the emergency department should any of the signs or symptoms worsen. The patient is to followup with primary care physician in 2-3 days for repeat evaluation. The patient has no other questions or concerns at this time the patient will be discharged home.       Clinical Impression:   Avni Baer was seen today for back injury, foot injury and knee injury. Diagnoses and all orders for this visit:    Left foot pain  -     XR FOOT LEFT (MIN 3 VIEWS); Future    Injury of right knee, initial encounter    Abrasion, right knee, initial encounter    Acute right-sided low back pain without sciatica        The patient is to call for any concerns or return if any of the signs or symptoms worsen. The patient is to follow-up with PCP in the next 2-3 days for repeat evaluation repeat assessment or go directly to the emergency department.      SIGNATURE: Chapo Chu III, PA-C

## 2022-04-22 ENCOUNTER — OFFICE VISIT (OUTPATIENT)
Dept: PRIMARY CARE CLINIC | Age: 57
End: 2022-04-22
Payer: COMMERCIAL

## 2022-04-22 VITALS
HEART RATE: 92 BPM | OXYGEN SATURATION: 98 % | TEMPERATURE: 97.2 F | SYSTOLIC BLOOD PRESSURE: 112 MMHG | WEIGHT: 315 LBS | DIASTOLIC BLOOD PRESSURE: 78 MMHG | BODY MASS INDEX: 55.54 KG/M2

## 2022-04-22 DIAGNOSIS — E29.1 HYPOGONADISM IN MALE: Primary | ICD-10-CM

## 2022-04-22 DIAGNOSIS — E11.9 TYPE 2 DIABETES MELLITUS WITHOUT COMPLICATION, WITHOUT LONG-TERM CURRENT USE OF INSULIN (HCC): ICD-10-CM

## 2022-04-22 DIAGNOSIS — I15.9 SECONDARY HYPERTENSION: ICD-10-CM

## 2022-04-22 DIAGNOSIS — R21 GROIN RASH: ICD-10-CM

## 2022-04-22 PROCEDURE — 1036F TOBACCO NON-USER: CPT | Performed by: INTERNAL MEDICINE

## 2022-04-22 PROCEDURE — 99214 OFFICE O/P EST MOD 30 MIN: CPT | Performed by: INTERNAL MEDICINE

## 2022-04-22 PROCEDURE — G8417 CALC BMI ABV UP PARAM F/U: HCPCS | Performed by: INTERNAL MEDICINE

## 2022-04-22 PROCEDURE — G8427 DOCREV CUR MEDS BY ELIG CLIN: HCPCS | Performed by: INTERNAL MEDICINE

## 2022-04-22 PROCEDURE — 3046F HEMOGLOBIN A1C LEVEL >9.0%: CPT | Performed by: INTERNAL MEDICINE

## 2022-04-22 PROCEDURE — 3017F COLORECTAL CA SCREEN DOC REV: CPT | Performed by: INTERNAL MEDICINE

## 2022-04-22 PROCEDURE — 2022F DILAT RTA XM EVC RTNOPTHY: CPT | Performed by: INTERNAL MEDICINE

## 2022-04-22 RX ORDER — LISINOPRIL 40 MG/1
TABLET ORAL
Qty: 90 TABLET | Refills: 1 | Status: SHIPPED | OUTPATIENT
Start: 2022-04-22

## 2022-04-22 RX ORDER — CLOTRIMAZOLE AND BETAMETHASONE DIPROPIONATE 10; .64 MG/G; MG/G
CREAM TOPICAL
Qty: 45 G | Refills: 0 | Status: SHIPPED
Start: 2022-04-22 | End: 2022-06-14 | Stop reason: ALTCHOICE

## 2022-04-22 RX ORDER — PIOGLITAZONEHYDROCHLORIDE 30 MG/1
TABLET ORAL
Qty: 45 TABLET | Refills: 5 | Status: SHIPPED | OUTPATIENT
Start: 2022-04-22

## 2022-04-22 SDOH — ECONOMIC STABILITY: FOOD INSECURITY: WITHIN THE PAST 12 MONTHS, THE FOOD YOU BOUGHT JUST DIDN'T LAST AND YOU DIDN'T HAVE MONEY TO GET MORE.: NEVER TRUE

## 2022-04-22 SDOH — ECONOMIC STABILITY: FOOD INSECURITY: WITHIN THE PAST 12 MONTHS, YOU WORRIED THAT YOUR FOOD WOULD RUN OUT BEFORE YOU GOT MONEY TO BUY MORE.: NEVER TRUE

## 2022-04-22 ASSESSMENT — PATIENT HEALTH QUESTIONNAIRE - PHQ9
SUM OF ALL RESPONSES TO PHQ QUESTIONS 1-9: 0
SUM OF ALL RESPONSES TO PHQ9 QUESTIONS 1 & 2: 0
SUM OF ALL RESPONSES TO PHQ QUESTIONS 1-9: 0
SUM OF ALL RESPONSES TO PHQ QUESTIONS 1-9: 0
2. FEELING DOWN, DEPRESSED OR HOPELESS: 0
SUM OF ALL RESPONSES TO PHQ QUESTIONS 1-9: 0
1. LITTLE INTEREST OR PLEASURE IN DOING THINGS: 0

## 2022-04-22 ASSESSMENT — SOCIAL DETERMINANTS OF HEALTH (SDOH): HOW HARD IS IT FOR YOU TO PAY FOR THE VERY BASICS LIKE FOOD, HOUSING, MEDICAL CARE, AND HEATING?: NOT HARD AT ALL

## 2022-04-22 NOTE — PROGRESS NOTES
4/22/22    Leonardo See, a male of 64 y.o. presents today for No chief complaint on file. At last appointment,   he was started on prednisone for right shoulder pain. We discussed the need for follow-up with physical therapy versus orthopedics if his symptoms persisted. He was seen in urgent care for left foot pain and knee pain, at that time he did have a fall. He is still having foot pain. /78   Pulse 92   Temp 97.2 °F (36.2 °C)   Wt (!) 421 lb (191 kg)   SpO2 98%   BMI 55.54 kg/m²     Review of Systems  Constitutional:Negative for activity change, appetite change, chills, fatigue and fever. Respiratory: Negative for choking, chest tightness, shortness of breath and wheezing. Cardiovascular: Negative for chest pain, palpitations and leg swelling. Gastrointestinal: Negative for abdominal distention, constipation, diarrhea, nausea and vomiting. Musculoskeletal: Negative for arthralgias, back pain, gait problem and joint swelling. Neurological: Negative for dizziness, weakness,numbness and headaches.      Patient Active Problem List    Diagnosis Date Noted    Obesity with body mass index greater than 30 01/13/2022    Chronic sinusitis 01/13/2022    Type 2 diabetes mellitus without complication ([facility] 25/80/0770    Secondary hypertension 02/01/2018     Allergies   Allergen Reactions    Ciprofloxacin     Levofloxacin Other (See Comments)     pain    Penicillins Hives    Sulfamethoxazole-Trimethoprim      UPSET STOAMCH    Biaxin [Clarithromycin] Other (See Comments)     Racing heart       Current Outpatient Medications on File Prior to Visit   Medication Sig Dispense Refill    BENZOYL PEROXIDE 10 % external wash USE TO [address] ONCE DAILY AS DIRECTED      clindamycin (CLEOCIN T) 1 % lotion APPLY TO AFFECTED AREA DAILY AS DIRECTED      predniSONE (DELTASONE) 5 MG tablet 4 tablets daily for 3 days then 3 tablets daily for 3 days then 2 tablets daily for 3 days then 1 tablet daily for 3 days 30 tablet 0    fluticasone (FLONASE) 50 MCG/ACT nasal spray 2 sprays by Each Nostril route daily 48 g 1    azelastine (ASTELIN) 0.1 % nasal spray 1 spray by Nasal route 2 times daily Use in each nostril as directed 90 mL 1    pantoprazole (PROTONIX) 20 MG tablet TAKE 1 TABLET BY MOUTH ONCE DAILY      fexofenadine (ALLEGRA) 180 MG tablet Take 180 mg by mouth daily      albuterol sulfate  (90 Base) MCG/ACT inhaler Inhale 2 puffs into the lungs every 6 hours as needed for Wheezing      montelukast (SINGULAIR) 10 MG tablet Take 10 mg by mouth nightly.  albuterol (PROVENTIL) (2.5 MG/3ML) 0.083% nebulizer solution Take 2.5 mg by nebulization every 6 hours as needed. No current facility-administered medications on file prior to visit. Physical Exam   Constitutional:  Oriented to person, place, and time. Appears well-developed and well-nourished. No acute distress. HENT: No sinus tenderness or lymphadenopathy  Head: Normocephalic and atraumatic. Eyes: Eyes exhibits no discharge. No scleral icterus present. Neck: No tracheal deviation present. No thyromegaly present. Cardiovascular: Normal rate, regular rhythm, normal heart sounds and intact distal pulses. Exam reveals no gallop nor friction rub. No murmur heard. Pulmonary: Effort normal and breath sounds normal. No respiratory distress. No wheezes or rales. Abdomen: No signs of rigidity rebound or organomegaly  Musculoskeletal:  No tenderness to palpation  Neurological:Alert and oriented to person, place, and time. Skin: No diaphoresis. Psychiatric: Normal mood and affect. Behavior is Normal.     ASSESSMENT AND PLAN:    Assessment  Diagnoses and all orders for this visit:  Hypogonadism in male  -     Testosterone, free, total; Future  Type 2 diabetes mellitus without complication, without long-term current use of insulin (HCC)  -     pioglitazone (ACTOS) 30 MG tablet;  Take 1/2 (one-half) tablet by mouth once daily  -     Hemoglobin A1C; Future  -     CBC; Future  -     Comprehensive Metabolic Panel; Future  -     Lipid Panel; Future  -     Ambulatory referral to Podiatry  Secondary hypertension  -     lisinopril (PRINIVIL;ZESTRIL) 40 MG tablet; Take 1/2 (one-half) tablet by mouth twice daily  Groin rash  -     clotrimazole-betamethasone (LOTRISONE) 1-0.05 % cream; Apply topically 2 times daily. 45 g      Plan    1. Continue current medication regimen  2. Check testosterone level, I would be judicious with testosterone replacement therapy  3. Continue to monitor foot pain, consider referral to podiatry    Return if symptoms worsen or fail to improve.     Electronically signed by Eh Restrepo DO on 4/22/2022 at 12:38 PM    Eh Restrepo DO

## 2022-04-28 ENCOUNTER — TELEPHONE (OUTPATIENT)
Dept: ENT CLINIC | Age: 57
End: 2022-04-28

## 2022-05-02 ENCOUNTER — HOSPITAL ENCOUNTER (OUTPATIENT)
Age: 57
Discharge: HOME | End: 2022-05-02
Payer: COMMERCIAL

## 2022-05-02 DIAGNOSIS — E29.1 HYPOGONADISM IN MALE: ICD-10-CM

## 2022-05-02 DIAGNOSIS — E11.9 TYPE 2 DIABETES MELLITUS WITHOUT COMPLICATION, WITHOUT LONG-TERM CURRENT USE OF INSULIN (HCC): ICD-10-CM

## 2022-05-02 LAB
ALBUMIN SERPL-MCNC: 4 G/DL (ref 3.5–5.2)
ALP BLD-CCNC: 86 U/L (ref 40–129)
ALT SERPL-CCNC: 20 U/L (ref 0–40)
ANION GAP SERPL CALCULATED.3IONS-SCNC: 11 MMOL/L (ref 7–16)
AST SERPL-CCNC: 16 U/L (ref 0–39)
BILIRUB SERPL-MCNC: 0.4 MG/DL (ref 0–1.2)
BUN BLDV-MCNC: 13 MG/DL (ref 6–20)
CALCIUM SERPL-MCNC: 9 MG/DL (ref 8.6–10.2)
CHLORIDE BLD-SCNC: 102 MMOL/L (ref 98–107)
CHOLESTEROL, TOTAL: 180 MG/DL (ref 0–199)
CO2: 25 MMOL/L (ref 22–29)
CREAT SERPL-MCNC: 1 MG/DL (ref 0.7–1.2)
GFR AFRICAN AMERICAN: >60
GFR NON-AFRICAN AMERICAN: >60 ML/MIN/1.73
GLUCOSE BLD-MCNC: 112 MG/DL (ref 74–99)
HBA1C MFR BLD: 6 % (ref 4–5.6)
HCT VFR BLD CALC: 43.2 % (ref 37–54)
HDLC SERPL-MCNC: 37 MG/DL
HEMOGLOBIN: 13.8 G/DL (ref 12.5–16.5)
LDL CHOLESTEROL CALCULATED: 123 MG/DL (ref 0–99)
MCH RBC QN AUTO: 28.9 PG (ref 26–35)
MCHC RBC AUTO-ENTMCNC: 31.9 % (ref 32–34.5)
MCV RBC AUTO: 90.4 FL (ref 80–99.9)
PDW BLD-RTO: 14.5 FL (ref 11.5–15)
PLATELET # BLD: 252 E9/L (ref 130–450)
PMV BLD AUTO: 9.5 FL (ref 7–12)
POTASSIUM SERPL-SCNC: 4.3 MMOL/L (ref 3.5–5)
RBC # BLD: 4.78 E12/L (ref 3.8–5.8)
SODIUM BLD-SCNC: 138 MMOL/L (ref 132–146)
TOTAL PROTEIN: 7.2 G/DL (ref 6.4–8.3)
TRIGL SERPL-MCNC: 101 MG/DL (ref 0–149)
VLDLC SERPL CALC-MCNC: 20 MG/DL
WBC # BLD: 6.8 E9/L (ref 4.5–11.5)

## 2022-05-02 PROCEDURE — 84270 ASSAY OF SEX HORMONE GLOBUL: CPT

## 2022-05-02 PROCEDURE — 85027 COMPLETE CBC AUTOMATED: CPT

## 2022-05-02 PROCEDURE — 80061 LIPID PANEL: CPT

## 2022-05-02 PROCEDURE — 36415 COLL VENOUS BLD VENIPUNCTURE: CPT

## 2022-05-02 PROCEDURE — 84403 ASSAY OF TOTAL TESTOSTERONE: CPT

## 2022-05-02 PROCEDURE — 83036 HEMOGLOBIN GLYCOSYLATED A1C: CPT

## 2022-05-02 PROCEDURE — 80053 COMPREHEN METABOLIC PANEL: CPT

## 2022-05-04 LAB
SEX HORMONE BINDING GLOBULIN: 31 NMOL/L (ref 11–80)
TESTOSTERONE FREE-NONMALE: 47.1 PG/ML (ref 47–244)
TESTOSTERONE TOTAL: 236 NG/DL (ref 220–1000)

## 2022-05-04 NOTE — TELEPHONE ENCOUNTER
2nd attempt to contact patient and move apt. Phone line rings busy. Apt needs moved as provider will not be in office.

## 2022-05-06 ENCOUNTER — OFFICE VISIT (OUTPATIENT)
Dept: PODIATRY | Age: 57
End: 2022-05-06
Payer: COMMERCIAL

## 2022-05-06 VITALS — BODY MASS INDEX: 41.75 KG/M2 | WEIGHT: 315 LBS | HEIGHT: 73 IN

## 2022-05-06 DIAGNOSIS — B35.1 TINEA UNGUIUM: Primary | ICD-10-CM

## 2022-05-06 DIAGNOSIS — E11.9 TYPE 2 DIABETES MELLITUS WITHOUT COMPLICATION, UNSPECIFIED WHETHER LONG TERM INSULIN USE (HCC): ICD-10-CM

## 2022-05-06 DIAGNOSIS — S99.922D INJURY OF LEFT FOOT, SUBSEQUENT ENCOUNTER: ICD-10-CM

## 2022-05-06 PROCEDURE — 99203 OFFICE O/P NEW LOW 30 MIN: CPT | Performed by: PODIATRIST

## 2022-05-06 PROCEDURE — 3017F COLORECTAL CA SCREEN DOC REV: CPT | Performed by: PODIATRIST

## 2022-05-06 PROCEDURE — 2022F DILAT RTA XM EVC RTNOPTHY: CPT | Performed by: PODIATRIST

## 2022-05-06 PROCEDURE — G8417 CALC BMI ABV UP PARAM F/U: HCPCS | Performed by: PODIATRIST

## 2022-05-06 PROCEDURE — 1036F TOBACCO NON-USER: CPT | Performed by: PODIATRIST

## 2022-05-06 PROCEDURE — G8427 DOCREV CUR MEDS BY ELIG CLIN: HCPCS | Performed by: PODIATRIST

## 2022-05-06 PROCEDURE — 3044F HG A1C LEVEL LT 7.0%: CPT | Performed by: PODIATRIST

## 2022-05-06 NOTE — PROGRESS NOTES
New patient in office for a diabetic foot exam and nail care. Injury to left foot from slipping and falling 2 weeks ago. Seen in express care and had xrays obtained. Joanna Nicholas DO last seen 2022. Martha Hoffman : 1965 Sex: male  Age: 64 y.o. Patient was referred by Joanna Nicholas DO    CC:    Diabetic foot ankle exam      HPI:   This 77-year-old male diabetic patient for me today for diabetic foot and ankle exam.  Did have an injury several weeks ago where he slipped on mud outside. Was seen on express care and did have a radiograph on 2022 showing no fractures or dislocations. Presents wearing regular shoes. Would like to discuss inserts or orthotics. Denies open skin lesions or abrasions. Denies any difficulty managing his nails. No additional pedal complaints at this time. ROS:  Const: Denies constitutional symptoms  Musculo: Denies symptoms other than stated above  Skin: Denies symptoms other than stated above       Current Outpatient Medications:     pioglitazone (ACTOS) 30 MG tablet, Take 1/2 (one-half) tablet by mouth once daily, Disp: 45 tablet, Rfl: 5    lisinopril (PRINIVIL;ZESTRIL) 40 MG tablet, Take 1/2 (one-half) tablet by mouth twice daily, Disp: 90 tablet, Rfl: 1    clotrimazole-betamethasone (LOTRISONE) 1-0.05 % cream, Apply topically 2 times daily.  45 g, Disp: 45 g, Rfl: 0    BENZOYL PEROXIDE 10 % external wash, USE TO WASH ONCE DAILY AS DIRECTED, Disp: , Rfl:     clindamycin (CLEOCIN T) 1 % lotion, APPLY TO AFFECTED AREA DAILY AS DIRECTED, Disp: , Rfl:     fluticasone (FLONASE) 50 MCG/ACT nasal spray, 2 sprays by Each Nostril route daily, Disp: 48 g, Rfl: 1    azelastine (ASTELIN) 0.1 % nasal spray, 1 spray by Nasal route 2 times daily Use in each nostril as directed, Disp: 90 mL, Rfl: 1    pantoprazole (PROTONIX) 20 MG tablet, TAKE 1 TABLET BY MOUTH ONCE DAILY, Disp: , Rfl:     fexofenadine (ALLEGRA) 180 MG tablet, Take 180 mg by mouth daily, Disp: , Rfl:     albuterol sulfate  (90 Base) MCG/ACT inhaler, Inhale 2 puffs into the lungs every 6 hours as needed for Wheezing, Disp: , Rfl:     montelukast (SINGULAIR) 10 MG tablet, Take 10 mg by mouth nightly., Disp: , Rfl:     albuterol (PROVENTIL) (2.5 MG/3ML) 0.083% nebulizer solution, Take 2.5 mg by nebulization every 6 hours as needed. , Disp: , Rfl:   Allergies   Allergen Reactions    Ciprofloxacin     Levofloxacin Other (See Comments)     pain    Penicillins Hives    Sulfamethoxazole-Trimethoprim      UPSET STOAMCH    Biaxin [Clarithromycin] Other (See Comments)     Racing heart         Past Medical History:   Diagnosis Date    Asthma     Diabetes mellitus ([facility] [address]     Hypertension     Prolonged emergence from general anesthesia            Vitals:    05/06/22 0912   Weight: (!) 421 lb (191 kg)   Height: 6' 1\" (1.854 m)       Work History/Social History: Foot and ankle history:     Focused Lower Extremity Physical Exam:    Neurovascular examination:    Dorsalis Pedis palpable bilateral.  Posterior tibialis palpable bilateral.    Capillary Refill Time:  Immediate return  Hair growth:  Symmetrical and bilateral   Skin:  Not atrophic  Edema: No edema bilateral feet or ankles. Neurologic:  Light touch intact bilateral.      Musculoskeletal/ Orthopedic examination:    Equinis: Absent bilateral  Dorsiflexion, plantarflexion, inversion, eversion bilateral 5 out of 5 muscle strength  Wiggling toes  Negative Homans  No significant tenderness overlying left foot today. No pain inversion eversion left foot. Dermatology examination:    No open skin lesions or abrasions bilateral lower extremity. Toenails 1 3 and 5 bilateral thickened mycotic and dystrophic. Within normal length. Assessment and Plan:  Harshal Pop was seen today for diabetes, callouses and nail problem.     Diagnoses and all orders for this visit:    Tinea unguium    Type 2 diabetes mellitus without complication, unspecified whether long term insulin use ([facility] [address]    Injury of left foot, subsequent encounter        Hemoglobin A1c from 5/2/2022.  6.0. Diabetic foot and ankle exam  Did have an injury last month on the left foot where he slipped on mud. We did review radiographs from [facility] from 4/12/2022 left foot. No acute fracture dislocations noted. I did write an order for [employer] for custom full-length inserts. We did discuss diabetic shoes as well he would like to hold off on shoes at this time. I will follow-up in office 3 months to monitor progression from diabetic standpoint. Return in about 3 months (around 8/6/2022). Seen By:  Mateo Reyes DPM      Document was created using voice recognition software. Note was reviewed, however may contain grammatical errors.

## 2022-05-31 ENCOUNTER — OFFICE VISIT (OUTPATIENT)
Dept: ENT CLINIC | Age: 57
End: 2022-05-31
Payer: COMMERCIAL

## 2022-05-31 VITALS
HEIGHT: 73 IN | HEART RATE: 83 BPM | BODY MASS INDEX: 41.75 KG/M2 | DIASTOLIC BLOOD PRESSURE: 68 MMHG | SYSTOLIC BLOOD PRESSURE: 133 MMHG | WEIGHT: 315 LBS

## 2022-05-31 DIAGNOSIS — J30.9 ALLERGIC RHINITIS, UNSPECIFIED SEASONALITY, UNSPECIFIED TRIGGER: Primary | ICD-10-CM

## 2022-05-31 DIAGNOSIS — R09.82 POST-NASAL DRAINAGE: ICD-10-CM

## 2022-05-31 PROCEDURE — G8417 CALC BMI ABV UP PARAM F/U: HCPCS | Performed by: NURSE PRACTITIONER

## 2022-05-31 PROCEDURE — 99213 OFFICE O/P EST LOW 20 MIN: CPT | Performed by: NURSE PRACTITIONER

## 2022-05-31 PROCEDURE — 3017F COLORECTAL CA SCREEN DOC REV: CPT | Performed by: NURSE PRACTITIONER

## 2022-05-31 PROCEDURE — 1036F TOBACCO NON-USER: CPT | Performed by: NURSE PRACTITIONER

## 2022-05-31 PROCEDURE — G8427 DOCREV CUR MEDS BY ELIG CLIN: HCPCS | Performed by: NURSE PRACTITIONER

## 2022-05-31 RX ORDER — FLUTICASONE PROPIONATE 50 MCG
2 SPRAY, SUSPENSION (ML) NASAL DAILY
Qty: 48 G | Refills: 1 | Status: SHIPPED | OUTPATIENT
Start: 2022-05-31

## 2022-05-31 RX ORDER — AZELASTINE 1 MG/ML
1 SPRAY, METERED NASAL 2 TIMES DAILY
Qty: 90 ML | Refills: 1 | Status: SHIPPED | OUTPATIENT
Start: 2022-05-31

## 2022-05-31 ASSESSMENT — ENCOUNTER SYMPTOMS
SINUS PRESSURE: 0
STRIDOR: 0
SORE THROAT: 1
SHORTNESS OF BREATH: 0
RESPIRATORY NEGATIVE: 1
SINUS PAIN: 0
EYES NEGATIVE: 1
RHINORRHEA: 0

## 2022-05-31 NOTE — PROGRESS NOTES
[facility]  Dr. Salmeron Dewitt. Robert Pretty. Ms.Ed        Patient Name:  Ayala Hernández  :  1965     CHIEF C/O:    Chief Complaint   Patient presents with    Follow-up     6 month allergy       HISTORY OBTAINED FROM:  patient    HISTORY OF PRESENT ILLNESS:       Alex Oconnor is a 64y.o. year old male, here today for follow up of allergy symptoms. Patient was last seen 6 months ago and restarted on Flonase with his Singulair and Allegra. He states he has noticed moderate improvement of his symptoms but continues to suffer from mild congestion and postnasal drainage symptoms causing a sore throat. He states this is worse with seasonal changes and increased pollen. He does continue to use nasal saline several times daily especially after being outside. He denies any sinus pain or pressure. He denies any recent fevers or recent antibiotics.       Past Medical History:   Diagnosis Date    Asthma     Diabetes mellitus ([address]     Hypertension     Prolonged emergence from general anesthesia      Past Surgical History:   Procedure Laterality Date    APPENDECTOMY      COLONOSCOPY N/A 4/15/2021    COLONOSCOPY WITH BIOPSY performed by Walter Salgado DO at [address] [place] [address]      UPPER GASTROINTESTINAL ENDOSCOPY N/A 4/15/2021    EGD ESOPHAGOGASTRODUODENOSCOPY DILATATION performed by Walter Salgado DO at [facility] ENDOSCOPY       Current Outpatient Medications:     fluticasone (FLONASE) 50 MCG/ACT nasal spray, 2 sprays by Each Nostril route daily, Disp: 48 g, Rfl: 1    azelastine (ASTELIN) 0.1 % nasal spray, 1 spray by Nasal route 2 times daily Use in each nostril as directed, Disp: 90 mL, Rfl: 1    pioglitazone (ACTOS) 30 MG tablet, Take 1/2 (one-half) tablet by mouth once daily, Disp: 45 tablet, Rfl: 5    lisinopril (PRINIVIL;ZESTRIL) 40 MG tablet, Take 1/2 (one-half) tablet by mouth twice daily, Disp: 90 tablet, Rfl: 1    clotrimazole-betamethasone (LOTRISONE) 1-0.05 % cream, Apply topically 2 times daily. 45 g, Disp: 45 g, Rfl: 0    BENZOYL PEROXIDE 10 % external wash, USE TO WASH ONCE DAILY AS DIRECTED, Disp: , Rfl:     clindamycin (CLEOCIN T) 1 % lotion, APPLY TO AFFECTED AREA DAILY AS DIRECTED, Disp: , Rfl:     pantoprazole (PROTONIX) 20 MG tablet, TAKE 1 TABLET BY MOUTH ONCE DAILY, Disp: , Rfl:     fexofenadine (ALLEGRA) 180 MG tablet, Take 180 mg by mouth daily, Disp: , Rfl:     albuterol sulfate  (90 Base) MCG/ACT inhaler, Inhale 2 puffs into the lungs every 6 hours as needed for Wheezing, Disp: , Rfl:     montelukast (SINGULAIR) 10 MG tablet, Take 10 mg by mouth nightly., Disp: , Rfl:     albuterol (PROVENTIL) (2.5 MG/3ML) 0.083% nebulizer solution, Take 2.5 mg by nebulization every 6 hours as needed. , Disp: , Rfl:   Ciprofloxacin, Levofloxacin, Penicillins, Sulfamethoxazole-trimethoprim, and Biaxin [clarithromycin]  Social History     Tobacco Use    Smoking status: Former Smoker     Packs/day: 2.00     Years: 17.00     Pack years: 34.00     Types: Cigarettes     Start date:      Quit date: 1999     Years since quittin.5    Smokeless tobacco: Never Used   Vaping Use    Vaping Use: Never used   Substance Use Topics    Alcohol use: No    Drug use: Not Currently     Family History   Problem Relation Age of Onset    Cancer Mother     Diabetes Mother        Review of Systems   Constitutional: Negative. Negative for activity change and appetite change. HENT: Positive for congestion, postnasal drip and sore throat. Negative for ear discharge, ear pain, rhinorrhea, sinus pressure and sinus pain. Eyes: Negative. Respiratory: Negative. Negative for shortness of breath and stridor. Cardiovascular: Negative. Negative for chest pain and palpitations. Endocrine: Negative. Musculoskeletal: Negative. Skin: Negative. Neurological: Negative. Negative for dizziness. Hematological: Negative.     Psychiatric/Behavioral: Negative. /68   Pulse 83   Ht 6' 1\" (1.854 m)   Wt (!) 421 lb (191 kg)   BMI 55.54 kg/m²   Physical Exam  Constitutional:       Appearance: Normal appearance. HENT:      Head: Normocephalic. Right Ear: Tympanic membrane, ear canal and external ear normal.      Left Ear: Tympanic membrane, ear canal and external ear normal.      Nose: Nose normal. No rhinorrhea. Right Turbinates: Swollen. Left Turbinates: Swollen. Mouth/Throat:      Lips: Pink. Mouth: Mucous membranes are moist.     Eyes:      Conjunctiva/sclera: Conjunctivae normal.      Pupils: Pupils are equal, round, and reactive to light. Cardiovascular:      Rate and Rhythm: Normal rate and regular rhythm. Pulses: Normal pulses. Pulmonary:      Effort: Pulmonary effort is normal. No respiratory distress. Breath sounds: No stridor. Musculoskeletal:         General: Normal range of motion. Cervical back: Normal range of motion. No rigidity. No muscular tenderness. Skin:     General: Skin is warm and dry. Neurological:      General: No focal deficit present. Mental Status: He is alert and oriented to person, place, and time. Psychiatric:         Mood and Affect: Mood normal.         Behavior: Behavior normal.         Thought Content: Thought content normal.         Judgment: Judgment normal.         IMPRESSION/PLAN:    Candace Anderson was seen today for follow-up. Diagnoses and all orders for this visit:    Allergic rhinitis, unspecified seasonality, unspecified trigger    Post-nasal drainage    Other orders  -     fluticasone (FLONASE) 50 MCG/ACT nasal spray; 2 sprays by Each Nostril route daily  -     azelastine (ASTELIN) 0.1 % nasal spray; 1 spray by Nasal route 2 times daily Use in each nostril as directed      Patient will continue with Flonase, 2 sprays each nostril once daily along with his Singulair and Allegra.   He will also be placed on Astelin spray, 1-2 spray each nostril twice daily as needed for continuing congestion and postnasal drainage symptoms. He will follow-up in 6 months. He is instructed to call with any new or worsening symptoms prior to his next appointment.         Fito Tello, MSN, FNP-C  [address], Nose and Throat    The information contained in this note has been dictated using drug and medical speech recognition software and may contain errors

## 2022-06-09 LAB — PROSTATE SPECIFIC ANTIGEN: 1.92 NG/ML (ref 0–4)

## 2022-06-14 ENCOUNTER — OFFICE VISIT (OUTPATIENT)
Dept: FAMILY MEDICINE CLINIC | Age: 57
End: 2022-06-14
Payer: COMMERCIAL

## 2022-06-14 VITALS
BODY MASS INDEX: 41.75 KG/M2 | DIASTOLIC BLOOD PRESSURE: 70 MMHG | HEIGHT: 73 IN | TEMPERATURE: 97.2 F | RESPIRATION RATE: 22 BRPM | SYSTOLIC BLOOD PRESSURE: 122 MMHG | WEIGHT: 315 LBS | OXYGEN SATURATION: 95 % | HEART RATE: 96 BPM

## 2022-06-14 DIAGNOSIS — R21 RASH AND NONSPECIFIC SKIN ERUPTION: Primary | ICD-10-CM

## 2022-06-14 DIAGNOSIS — L30.4 ERYTHEMA INTERTRIGO: ICD-10-CM

## 2022-06-14 PROCEDURE — G8417 CALC BMI ABV UP PARAM F/U: HCPCS | Performed by: PHYSICIAN ASSISTANT

## 2022-06-14 PROCEDURE — 99213 OFFICE O/P EST LOW 20 MIN: CPT | Performed by: PHYSICIAN ASSISTANT

## 2022-06-14 PROCEDURE — G8427 DOCREV CUR MEDS BY ELIG CLIN: HCPCS | Performed by: PHYSICIAN ASSISTANT

## 2022-06-14 PROCEDURE — 3017F COLORECTAL CA SCREEN DOC REV: CPT | Performed by: PHYSICIAN ASSISTANT

## 2022-06-14 PROCEDURE — 1036F TOBACCO NON-USER: CPT | Performed by: PHYSICIAN ASSISTANT

## 2022-06-14 RX ORDER — CLOTRIMAZOLE AND BETAMETHASONE DIPROPIONATE 10; .64 MG/G; MG/G
CREAM TOPICAL
Qty: 45 G | Refills: 0 | Status: SHIPPED | OUTPATIENT
Start: 2022-06-14

## 2022-06-14 RX ORDER — CLINDAMYCIN HYDROCHLORIDE 300 MG/1
300 CAPSULE ORAL 4 TIMES DAILY
Qty: 40 CAPSULE | Refills: 0 | Status: SHIPPED
Start: 2022-06-14 | End: 2022-08-10 | Stop reason: SDUPTHER

## 2022-06-14 NOTE — PROGRESS NOTES
22  Armand Arevalo : 1965 Sex: male  Age 64 y.o. Subjective:  Chief Complaint   Patient presents with    Rash     Groin area         HPI:   Armand Arevalo , 64 y.o. male presents to express care for evaluation of left groin      HPI  59-year-old male presents to express care for evaluation of possible infection in the left groin. The patient has this rash and irritation in the left groin. States that it is very similar to a fungal infection that he has had in the past.  The patient feels that this is similar to the presentation that he has had. The patient is out of his clotrimazole/betamethasone that he has been prescribed in the past.  The patient states that his blood sugar has been well controlled. The patient is also had abscesses in that area before. The patient wants to make sure that he does not develop any of these. The patient is not have any fever, chills. ROS:   Unless otherwise stated in this report the patient's positive and negative responses for review of systems for constitutional, eyes, ENT, cardiovascular, respiratory, gastrointestinal, neurological, , musculoskeletal, and integument systems and related systems to the presenting problem are either stated in the history of present illness or were not pertinent or were negative for the symptoms and/or complaints related to the presenting medical problem. Positives and pertinent negatives as per HPI. All others reviewed and are negative.       PMH:     Past Medical History:   Diagnosis Date    Asthma     Diabetes mellitus ([facility] [address]     Hypertension     Prolonged emergence from general anesthesia        Past Surgical History:   Procedure Laterality Date    APPENDECTOMY      COLONOSCOPY N/A 4/15/2021    COLONOSCOPY WITH BIOPSY performed by John Paul Gudino DO at [id number] [facility] CYST REMOVAL      FOOT SURGERY      TONSILLECTOMY      UPPER GASTROINTESTINAL ENDOSCOPY N/A 4/15/2021    EGD ESOPHAGOGASTRODUODENOSCOPY DILATATION performed by Bob Graves DO at [facility] ENDOSCOPY       Family History   Problem Relation Age of Onset    Cancer Mother     Diabetes Mother        Medications:     Current Outpatient Medications:     clindamycin (CLEOCIN) 300 MG capsule, Take 1 capsule by mouth 4 times daily for 10 days, Disp: 40 capsule, Rfl: 0    clotrimazole-betamethasone (LOTRISONE) 1-0.05 % cream, Apply topically 2 times daily. , Disp: 45 g, Rfl: 0    fluticasone (FLONASE) 50 MCG/ACT nasal spray, 2 sprays by Each Nostril route daily, Disp: 48 g, Rfl: 1    azelastine (ASTELIN) 0.1 % nasal spray, 1 spray by Nasal route 2 times daily Use in each nostril as directed, Disp: 90 mL, Rfl: 1    pioglitazone (ACTOS) 30 MG tablet, Take 1/2 (one-half) tablet by mouth once daily, Disp: 45 tablet, Rfl: 5    lisinopril (PRINIVIL;ZESTRIL) 40 MG tablet, Take 1/2 (one-half) tablet by mouth twice daily, Disp: 90 tablet, Rfl: 1    BENZOYL PEROXIDE 10 % external wash, USE TO WASH ONCE DAILY AS DIRECTED, Disp: , Rfl:     clindamycin (CLEOCIN T) 1 % lotion, APPLY TO AFFECTED AREA DAILY AS DIRECTED, Disp: , Rfl:     pantoprazole (PROTONIX) 20 MG tablet, TAKE 1 TABLET BY MOUTH ONCE DAILY, Disp: , Rfl:     fexofenadine (ALLEGRA) 180 MG tablet, Take 180 mg by mouth daily, Disp: , Rfl:     albuterol sulfate  (90 Base) MCG/ACT inhaler, Inhale 2 puffs into the lungs every 6 hours as needed for Wheezing, Disp: , Rfl:     montelukast (SINGULAIR) 10 MG tablet, Take 10 mg by mouth nightly., Disp: , Rfl:     albuterol (PROVENTIL) (2.5 MG/3ML) 0.083% nebulizer solution, Take 2.5 mg by nebulization every 6 hours as needed. , Disp: , Rfl:     Allergies:      Allergies   Allergen Reactions    Ciprofloxacin     Levofloxacin Other (See Comments)     pain    Penicillins Hives    Sulfamethoxazole-Trimethoprim      UPSET STOAMCH    Biaxin [Clarithromycin] Other (See Comments)     Racing heart         Social History:     Social History     Tobacco Use  Smoking status: Former Smoker     Packs/day: 2.00     Years: 17.00     Pack years: 34.00     Types: Cigarettes     Start date:      Quit date: 1999     Years since quittin.5    Smokeless tobacco: Never Used   Vaping Use    Vaping Use: Never used   Substance Use Topics    Alcohol use: No    Drug use: Not Currently       Patient lives at home. Physical Exam:     Vitals:    22 1502   BP: 122/70   Site: Right Wrist   Position: Sitting   Cuff Size: Medium Adult   Pulse: 96   Resp: 22   Temp: 97.2 °F (36.2 °C)   TempSrc: Temporal   SpO2: 95%   Weight: (!) 421 lb (191 kg)   Height: 6' 1\" (1.854 m)       Exam:  Physical Exam  Nurses note and vital signs reviewed and patient is not hypoxic. General: The patient appears well and in no apparent distress. Patient is resting comfortably on cart. Skin: Warm, dry, no pallor noted. There is no rash noted. Head: Normocephalic, atraumatic. Eye: Normal conjunctiva  Ears, Nose, Mouth, and Throat: Wearing a mask  Cardiovascular: Regular Rate  Respiratory: Patient is in no distress, no accessory muscle use, speaks in full complete sentences, no auditory wheezing  GI: Protuberant abdomen, soft, no significant tenderness  Musculoskeletal: Into the left groin area the patient does have evidence of a beefy red rash that seems to be consistent with a fungal type infection. The patient does have some tenderness though in the intertrigo area. The patient does not have any fluctuance or induration currently. Neurological: A&O x4, normal speech      Testing:           Medical Decision Making:     Vital signs reviewed    Past medical history reviewed. Allergies reviewed. Medications reviewed. Patient on arrival does not appear to be in any apparent distress or discomfort. The patient has been seen and evaluated. The patient does not appear to be toxic or lethargic. We discussed differential diagnosis with the patient.   We will treat the

## 2022-07-12 ENCOUNTER — HOSPITAL ENCOUNTER (OUTPATIENT)
Age: 57
Discharge: HOME | End: 2022-07-12
Payer: COMMERCIAL

## 2022-07-12 LAB
CHOLESTEROL, TOTAL: 185 MG/DL (ref 0–199)
HCT VFR BLD CALC: 47.3 % (ref 37–54)
HDLC SERPL-MCNC: 34 MG/DL
HEMOGLOBIN: 15.8 G/DL (ref 12.5–16.5)
LDL CHOLESTEROL CALCULATED: 119 MG/DL (ref 0–99)
LUTEINIZING HORMONE: 0.2 MIU/ML
MCH RBC QN AUTO: 29.1 PG (ref 26–35)
MCHC RBC AUTO-ENTMCNC: 33.4 % (ref 32–34.5)
MCV RBC AUTO: 87.1 FL (ref 80–99.9)
PDW BLD-RTO: 15.4 FL (ref 11.5–15)
PLATELET # BLD: 246 E9/L (ref 130–450)
PMV BLD AUTO: 9.4 FL (ref 7–12)
PROLACTIN: 8.2 NG/ML
RBC # BLD: 5.43 E12/L (ref 3.8–5.8)
TESTOSTERONE TOTAL: 566.2 NG/DL
TRIGL SERPL-MCNC: 159 MG/DL (ref 0–149)
VLDLC SERPL CALC-MCNC: 32 MG/DL
WBC # BLD: 7.4 E9/L (ref 4.5–11.5)

## 2022-07-12 PROCEDURE — 84403 ASSAY OF TOTAL TESTOSTERONE: CPT

## 2022-07-12 PROCEDURE — 36415 COLL VENOUS BLD VENIPUNCTURE: CPT

## 2022-07-12 PROCEDURE — 83002 ASSAY OF GONADOTROPIN (LH): CPT

## 2022-07-12 PROCEDURE — 84146 ASSAY OF PROLACTIN: CPT

## 2022-07-12 PROCEDURE — 80061 LIPID PANEL: CPT

## 2022-07-12 PROCEDURE — 85027 COMPLETE CBC AUTOMATED: CPT

## 2022-08-10 ENCOUNTER — OFFICE VISIT (OUTPATIENT)
Dept: FAMILY MEDICINE CLINIC | Age: 57
End: 2022-08-10
Payer: COMMERCIAL

## 2022-08-10 VITALS
DIASTOLIC BLOOD PRESSURE: 68 MMHG | TEMPERATURE: 97.8 F | OXYGEN SATURATION: 96 % | BODY MASS INDEX: 41.75 KG/M2 | WEIGHT: 315 LBS | HEIGHT: 73 IN | RESPIRATION RATE: 22 BRPM | HEART RATE: 98 BPM | SYSTOLIC BLOOD PRESSURE: 128 MMHG

## 2022-08-10 DIAGNOSIS — R09.81 NASAL CONGESTION: ICD-10-CM

## 2022-08-10 DIAGNOSIS — H10.9 CONJUNCTIVITIS OF BOTH EYES, UNSPECIFIED CONJUNCTIVITIS TYPE: ICD-10-CM

## 2022-08-10 DIAGNOSIS — J01.90 ACUTE NON-RECURRENT SINUSITIS, UNSPECIFIED LOCATION: Primary | ICD-10-CM

## 2022-08-10 DIAGNOSIS — R09.82 POSTNASAL DRIP: ICD-10-CM

## 2022-08-10 PROCEDURE — 99213 OFFICE O/P EST LOW 20 MIN: CPT | Performed by: PHYSICIAN ASSISTANT

## 2022-08-10 PROCEDURE — 3017F COLORECTAL CA SCREEN DOC REV: CPT | Performed by: PHYSICIAN ASSISTANT

## 2022-08-10 PROCEDURE — 1036F TOBACCO NON-USER: CPT | Performed by: PHYSICIAN ASSISTANT

## 2022-08-10 PROCEDURE — G8427 DOCREV CUR MEDS BY ELIG CLIN: HCPCS | Performed by: PHYSICIAN ASSISTANT

## 2022-08-10 PROCEDURE — G8417 CALC BMI ABV UP PARAM F/U: HCPCS | Performed by: PHYSICIAN ASSISTANT

## 2022-08-10 RX ORDER — TOBRAMYCIN 3 MG/ML
1 SOLUTION/ DROPS OPHTHALMIC EVERY 4 HOURS
Qty: 5 ML | Refills: 0 | Status: SHIPPED | OUTPATIENT
Start: 2022-08-10 | End: 2022-08-20

## 2022-08-10 RX ORDER — CLINDAMYCIN HYDROCHLORIDE 300 MG/1
300 CAPSULE ORAL 4 TIMES DAILY
Qty: 40 CAPSULE | Refills: 0 | Status: SHIPPED | OUTPATIENT
Start: 2022-08-10 | End: 2022-08-20

## 2022-08-10 NOTE — PROGRESS NOTES
8/10/22  Darrius Damico : 1965 Sex: male  Age 64 y.o. Subjective:  Chief Complaint   Patient presents with    Sinus Problem     Thinks sinus infection, home test negative COVID    Congestion    Otalgia         HPI:   Darrius Damico , 64 y.o. male presents to express care for evaluation of sinus pain, congestion, ear pain    HPI  19-year-old male presents to express care for evaluation of sinus congestion, drainage, ear pain. The patient's had the symptoms ongoing for the last couple of days. The patient is having increased sinus pressure and drainage. The patient has noted some crusting around the eyes. The patient denies any chest pain, shortness of breath. No fever, chills. The patient did do a home COVID test that was negative. Believes that he has more of a sinus infection. The patient has had COVID-vaccine. The patient has had 1 booster. ROS:   Unless otherwise stated in this report the patient's positive and negative responses for review of systems for constitutional, eyes, ENT, cardiovascular, respiratory, gastrointestinal, neurological, , musculoskeletal, and integument systems and related systems to the presenting problem are either stated in the history of present illness or were not pertinent or were negative for the symptoms and/or complaints related to the presenting medical problem. Positives and pertinent negatives as per HPI. All others reviewed and are negative.       PMH:     Past Medical History:   Diagnosis Date    Asthma     Diabetes mellitus ([address]     Hypertension     Prolonged emergence from general anesthesia        Past Surgical History:   Procedure Laterality Date    APPENDECTOMY      COLONOSCOPY N/A 4/15/2021    COLONOSCOPY WITH BIOPSY performed by Gilson Rivero DO at [address] [place] [address] GASTROINTESTINAL ENDOSCOPY N/A 4/15/2021    EGD ESOPHAGOGASTRODUODENOSCOPY DILATATION performed by Gilson Rivero DO at [facility] ENDOSCOPY       Family History   Problem Relation Age of Onset    Cancer Mother     Diabetes Mother        Medications:     Current Outpatient Medications:     tobramycin (TOBREX) 0.3 % ophthalmic solution, Place 1 drop into both eyes every 4 hours for 10 days, Disp: 5 mL, Rfl: 0    clindamycin (CLEOCIN) 300 MG capsule, Take 1 capsule by mouth in the morning and 1 capsule at noon and 1 capsule in the evening and 1 capsule before bedtime. Do all this for 10 days. , Disp: 40 capsule, Rfl: 0    ciclopirox (LOPROX) 0.77 % cream, APPLY CREAM TOPICALLY TWICE DAILY FOR 14 DAYS AS NEEDED FOR OUTBREAK AS DIRECTED, Disp: , Rfl:     clotrimazole-betamethasone (LOTRISONE) 1-0.05 % cream, Apply topically 2 times daily. , Disp: 45 g, Rfl: 0    fluticasone (FLONASE) 50 MCG/ACT nasal spray, 2 sprays by Each Nostril route daily, Disp: 48 g, Rfl: 1    azelastine (ASTELIN) 0.1 % nasal spray, 1 spray by Nasal route 2 times daily Use in each nostril as directed, Disp: 90 mL, Rfl: 1    pioglitazone (ACTOS) 30 MG tablet, Take 1/2 (one-half) tablet by mouth once daily, Disp: 45 tablet, Rfl: 5    lisinopril (PRINIVIL;ZESTRIL) 40 MG tablet, Take 1/2 (one-half) tablet by mouth twice daily, Disp: 90 tablet, Rfl: 1    clindamycin (CLEOCIN T) 1 % lotion, APPLY TO AFFECTED AREA DAILY AS DIRECTED, Disp: , Rfl:     pantoprazole (PROTONIX) 20 MG tablet, TAKE 1 TABLET BY MOUTH ONCE DAILY, Disp: , Rfl:     albuterol sulfate  (90 Base) MCG/ACT inhaler, Inhale 2 puffs into the lungs every 6 hours as needed for Wheezing, Disp: , Rfl:     montelukast (SINGULAIR) 10 MG tablet, Take 10 mg by mouth nightly., Disp: , Rfl:     albuterol (PROVENTIL) (2.5 MG/3ML) 0.083% nebulizer solution, Take 2.5 mg by nebulization every 6 hours as needed. , Disp: , Rfl:     Allergies:      Allergies   Allergen Reactions    Ciprofloxacin     Levofloxacin Other (See Comments)     pain    Penicillins Hives    Sulfamethoxazole-Trimethoprim      UPSET STOAMCH Biaxin [Clarithromycin] Other (See Comments)     Racing heart         Social History:     Social History     Tobacco Use    Smoking status: Former     Packs/day: 2.00     Years: 17.00     Pack years: 34.00     Types: Cigarettes     Start date:      Quit date: 1999     Years since quittin.6    Smokeless tobacco: Never   Vaping Use    Vaping Use: Never used   Substance Use Topics    Alcohol use: No    Drug use: Not Currently       Patient lives at home. Physical Exam:     Vitals:    08/10/22 1331   BP: 128/68   Site: Right Lower Arm   Position: Sitting   Cuff Size: Medium Adult   Pulse: 98   Resp: 22   Temp: 97.8 °F (36.6 °C)   TempSrc: Temporal   SpO2: 96%   Weight: (!) 425 lb (192.8 kg)   Height: 6' 1\" (1.854 m)       Exam:  Physical Exam  Nurse's notes and vital signs reviewed. The patient is not hypoxic. ? General: Alert, no acute distress, patient resting comfortably Patient is not toxic or lethargic. Skin: Warm, intact, no pallor noted. There is no evidence of rash at this time. Head: Normocephalic, atraumatic  Eye: Normal conjunctiva, there is quite a bit of drainage and crusting of the eyes but no significant conjunctival injection, PERRLA, EOMI  Ears, Nose, Throat: Right tympanic membrane clear, left tympanic membrane clear. No drainage or discharge noted. No pre- or post-auricular tenderness, erythema, or swelling noted. Nasal congestion, rhinorrhea  Posterior oropharynx shows erythema and cobblestoning but no evidence of tonsillar hypertrophy, or exudate. the uvula is midline. No trismus or drooling is noted. Moist mucous membranes. Cardiovascular: Regular Rate and Rhythm  Respiratory: No acute distress, no rhonchi, wheezing or crackles noted. No stridor or retractions are noted. Neurological: A&O x4, normal speech  Psychiatric: Cooperative       Testing:           Medical Decision Making:     Vital signs reviewed    Past medical history reviewed.     Allergies reviewed. Medications reviewed. Patient on arrival does not appear to be in any apparent distress or discomfort. The patient has been seen and evaluated. The patient does not appear to be toxic or lethargic. The patient will be treated with clindamycin and tobramycin. The patient will follow-up with PCP. Call with any questions or concerns. The patient has adverse reactions to medications and he typically can tolerate clindamycin well. The patient was educated on the proper dosage of motrin and tylenol and the appropriate intervals of each. The patient is to increase fluid intake over the next several days. The patient is to use OTC decongestant as needed. The patient is to return to express care or go directly to the emergency department should any of the signs or symptoms worsen. The patient is to followup with primary care physician in 2-3 days for repeat evaluation. The patient has no other questions or concerns at this time the patient will be discharged home. Clinical Impression:   Jelly Kent was seen today for sinus problem, congestion and otalgia. Diagnoses and all orders for this visit:    Acute non-recurrent sinusitis, unspecified location    Nasal congestion    Postnasal drip    Conjunctivitis of both eyes, unspecified conjunctivitis type    Other orders  -     tobramycin (TOBREX) 0.3 % ophthalmic solution; Place 1 drop into both eyes every 4 hours for 10 days  -     clindamycin (CLEOCIN) 300 MG capsule; Take 1 capsule by mouth in the morning and 1 capsule at noon and 1 capsule in the evening and 1 capsule before bedtime. Do all this for 10 days. The patient is to call for any concerns or return if any of the signs or symptoms worsen. The patient is to follow-up with PCP in the next 2-3 days for repeat evaluation repeat assessment or go directly to the emergency department.      SIGNATURE: Dre Zacarias III, PA-C

## 2022-08-12 ENCOUNTER — PROCEDURE VISIT (OUTPATIENT)
Dept: PODIATRY | Age: 57
End: 2022-08-12
Payer: COMMERCIAL

## 2022-08-12 DIAGNOSIS — G60.8 HEREDITARY SENSORY NEUROPATHY: ICD-10-CM

## 2022-08-12 DIAGNOSIS — B35.1 TINEA UNGUIUM: Primary | ICD-10-CM

## 2022-08-12 DIAGNOSIS — S99.922D INJURY OF LEFT FOOT, SUBSEQUENT ENCOUNTER: ICD-10-CM

## 2022-08-12 DIAGNOSIS — E11.9 TYPE 2 DIABETES MELLITUS WITHOUT COMPLICATION, UNSPECIFIED WHETHER LONG TERM INSULIN USE (HCC): ICD-10-CM

## 2022-08-12 PROCEDURE — 99213 OFFICE O/P EST LOW 20 MIN: CPT | Performed by: PODIATRIST

## 2022-08-12 PROCEDURE — 2022F DILAT RTA XM EVC RTNOPTHY: CPT | Performed by: PODIATRIST

## 2022-08-12 PROCEDURE — 3044F HG A1C LEVEL LT 7.0%: CPT | Performed by: PODIATRIST

## 2022-08-12 PROCEDURE — 3017F COLORECTAL CA SCREEN DOC REV: CPT | Performed by: PODIATRIST

## 2022-08-12 PROCEDURE — G8417 CALC BMI ABV UP PARAM F/U: HCPCS | Performed by: PODIATRIST

## 2022-08-12 PROCEDURE — 1036F TOBACCO NON-USER: CPT | Performed by: PODIATRIST

## 2022-08-12 PROCEDURE — G8427 DOCREV CUR MEDS BY ELIG CLIN: HCPCS | Performed by: PODIATRIST

## 2022-08-12 NOTE — PROGRESS NOTES
Aurelia Benz is here today for a diabetic foot exam. his PCP is Bee Bates DO last OV was 04/22/2022.

## 2022-08-12 NOTE — PROGRESS NOTES
CC:    Follow-up diabetic foot and ankle exam    HPI:   Alicia Lopez presents today for a diabetic foot and ankle exam.  Previously did have a left foot left ankle injury. No left ankle pain or left foot pain today. Has progressed well. Wearing regular shoes. Denies any foot or ankle issues at this time. Denies any calf pain. No additional pedal complaints. ROS:  Const: Denies constitutional symptoms  Musculo: Denies symptoms other than stated above  Skin: Denies symptoms other than stated above       Current Outpatient Medications:     ciclopirox (LOPROX) 0.77 % cream, APPLY CREAM TOPICALLY TWICE DAILY FOR 14 DAYS AS NEEDED FOR OUTBREAK AS DIRECTED, Disp: , Rfl:     tobramycin (TOBREX) 0.3 % ophthalmic solution, Place 1 drop into both eyes every 4 hours for 10 days, Disp: 5 mL, Rfl: 0    clindamycin (CLEOCIN) 300 MG capsule, Take 1 capsule by mouth in the morning and 1 capsule at noon and 1 capsule in the evening and 1 capsule before bedtime. Do all this for 10 days. , Disp: 40 capsule, Rfl: 0    clotrimazole-betamethasone (LOTRISONE) 1-0.05 % cream, Apply topically 2 times daily. , Disp: 45 g, Rfl: 0    fluticasone (FLONASE) 50 MCG/ACT nasal spray, 2 sprays by Each Nostril route daily, Disp: 48 g, Rfl: 1    azelastine (ASTELIN) 0.1 % nasal spray, 1 spray by Nasal route 2 times daily Use in each nostril as directed, Disp: 90 mL, Rfl: 1    pioglitazone (ACTOS) 30 MG tablet, Take 1/2 (one-half) tablet by mouth once daily, Disp: 45 tablet, Rfl: 5    lisinopril (PRINIVIL;ZESTRIL) 40 MG tablet, Take 1/2 (one-half) tablet by mouth twice daily, Disp: 90 tablet, Rfl: 1    clindamycin (CLEOCIN T) 1 % lotion, APPLY TO AFFECTED AREA DAILY AS DIRECTED, Disp: , Rfl:     pantoprazole (PROTONIX) 20 MG tablet, TAKE 1 TABLET BY MOUTH ONCE DAILY, Disp: , Rfl:     albuterol sulfate  (90 Base) MCG/ACT inhaler, Inhale 2 puffs into the lungs every 6 hours as needed for Wheezing, Disp: , Rfl:     montelukast (SINGULAIR) 10 MG tablet, Take 10 mg by mouth nightly., Disp: , Rfl:     albuterol (PROVENTIL) (2.5 MG/3ML) 0.083% nebulizer solution, Take 2.5 mg by nebulization every 6 hours as needed. , Disp: , Rfl:   Allergies   Allergen Reactions    Ciprofloxacin     Levofloxacin Other (See Comments)     pain    Penicillins Hives    Sulfamethoxazole-Trimethoprim      UPSET STOAMCH    Biaxin [Clarithromycin] Other (See Comments)     Racing heart         Past Medical History:   Diagnosis Date    Asthma     Diabetes mellitus ([facility] [address]     Hypertension     Prolonged emergence from general anesthesia            There were no vitals filed for this visit. Work History/Social History: Foot and ankle history:     Focused Lower Extremity Physical Exam:    Neurovascular examination:    Dorsalis Pedis palpable bilateral.  Posterior tibialis palpable bilateral.    Capillary Refill Time:  Immediate return  Hair growth:  Symmetrical and bilateral   Skin:  Not atrophic  Edema: Mild edema bilateral feet or ankles. Neurologic:  Light touch diminished bilateral.      Musculoskeletal/ Orthopedic examination:    Equinis: Absent bilateral  Dorsiflexion, plantarflexion, inversion, eversion bilateral 5 out of 5 muscle strength  Wiggling toes  Negative Homans  No pain left foot. No pain inversion eversion left subtalar joint. No tenderness dorsiflexion plantarflexion ankle joint bilateral.    Dermatology examination:    No erythema or open skin lesions. Toenails 1, 3 and 5 bilateral thickened mycotic and dystrophic. Within normal length. Assessment and Plan:  Anisa Mckenzie was seen today for follow-up and diabetes. Diagnoses and all orders for this visit:    Tinea unguium    Type 2 diabetes mellitus without complication, unspecified whether long term insulin use ([facility] [address]    Hereditary sensory neuropathy    Injury of left foot, subsequent encounter      Hemoglobin A1c from 5/2/2022.  6.0. Follow-up diabetic exam.  Previous injury left foot and ankle.   No left foot or ankle pain today. Progressing well. I did tell be happy to see him back every 2 months for formal debridement all toenails. He would like to hold off at this time as he has no issues with his nails cutting them. I will follow-up in 1 year for regular diabetic foot and ankle exam.  Stressed importance avoiding barefoot. Continue wide well supported walking shoes. At last visit we did write a prescription for [employer] for diabetic shoes and inserts. Follow-up once again in 1 year. Return in about 1 year (around 8/12/2023). Seen By:  Axel Mayes DPM      Document was created using voice recognition software. Note was reviewed, however may contain grammatical errors.

## 2022-08-29 ENCOUNTER — OFFICE VISIT (OUTPATIENT)
Dept: FAMILY MEDICINE CLINIC | Age: 57
End: 2022-08-29
Payer: COMMERCIAL

## 2022-08-29 VITALS
DIASTOLIC BLOOD PRESSURE: 82 MMHG | TEMPERATURE: 98.2 F | SYSTOLIC BLOOD PRESSURE: 132 MMHG | HEART RATE: 89 BPM | OXYGEN SATURATION: 98 % | WEIGHT: 315 LBS | BODY MASS INDEX: 55.86 KG/M2

## 2022-08-29 DIAGNOSIS — S63.501A RIGHT WRIST SPRAIN, INITIAL ENCOUNTER: Primary | ICD-10-CM

## 2022-08-29 PROCEDURE — 1036F TOBACCO NON-USER: CPT | Performed by: EMERGENCY MEDICINE

## 2022-08-29 PROCEDURE — G8417 CALC BMI ABV UP PARAM F/U: HCPCS | Performed by: EMERGENCY MEDICINE

## 2022-08-29 PROCEDURE — 3017F COLORECTAL CA SCREEN DOC REV: CPT | Performed by: EMERGENCY MEDICINE

## 2022-08-29 PROCEDURE — G8427 DOCREV CUR MEDS BY ELIG CLIN: HCPCS | Performed by: EMERGENCY MEDICINE

## 2022-08-29 PROCEDURE — 99213 OFFICE O/P EST LOW 20 MIN: CPT | Performed by: EMERGENCY MEDICINE

## 2022-08-29 ASSESSMENT — ENCOUNTER SYMPTOMS
EYE REDNESS: 0
ABDOMINAL PAIN: 0
SORE THROAT: 0
SHORTNESS OF BREATH: 0
EYE DISCHARGE: 0
NAUSEA: 0
BACK PAIN: 0
DIARRHEA: 0
VOMITING: 0
EYE PAIN: 0
COUGH: 0
SINUS PRESSURE: 0
WHEEZING: 0

## 2022-08-29 NOTE — PROGRESS NOTES
Chief Complaint:   Wrist Pain (Right wrist)      History of Present Illness   HPI:  Abdoulaye Selby is a 64 y.o. male who presents to [facility] today for hyperextended wrist, pain distal radius. Prior to Visit Medications    Medication Sig Taking? Authorizing Provider   ciclopirox (LOPROX) 0.77 % cream APPLY CREAM TOPICALLY TWICE DAILY FOR 14 DAYS AS NEEDED FOR OUTBREAK AS DIRECTED  Historical Provider, MD   clotrimazole-betamethasone (LOTRISONE) 1-0.05 % cream Apply topically 2 times daily. DONATO Feliciano III   fluticasone (FLONASE) 50 MCG/ACT nasal spray 2 sprays by Each Nostril route daily  Montey Situ, APRN - CNP   azelastine (ASTELIN) 0.1 % nasal spray 1 spray by Nasal route 2 times daily Use in each nostril as directed  Montey Situ, TIGIST - CNP   pioglitazone (ACTOS) 30 MG tablet Take 1/2 (one-half) tablet by mouth once daily  Katelyn Ovalle,    lisinopril (PRINIVIL;ZESTRIL) 40 MG tablet Take 1/2 (one-half) tablet by mouth twice daily  Katelyn Ovalle,    clindamycin (CLEOCIN T) 1 % lotion APPLY TO AFFECTED AREA DAILY AS DIRECTED  Historical Provider, MD   pantoprazole (PROTONIX) 20 MG tablet TAKE 1 TABLET BY MOUTH ONCE DAILY  Historical Provider, MD   albuterol sulfate  (90 Base) MCG/ACT inhaler Inhale 2 puffs into the lungs every 6 hours as needed for Wheezing  Historical Provider, MD   montelukast (SINGULAIR) 10 MG tablet Take 10 mg by mouth nightly. Historical Provider, MD   albuterol (PROVENTIL) (2.5 MG/3ML) 0.083% nebulizer solution Take 2.5 mg by nebulization every 6 hours as needed. Historical Provider, MD       Review of Systems   Review of Systems   Constitutional:  Negative for chills and fever. HENT:  Negative for ear pain, sinus pressure and sore throat. Eyes:  Negative for pain, discharge and redness. Respiratory:  Negative for cough, shortness of breath and wheezing. Cardiovascular:  Negative for chest pain.    Gastrointestinal:  Negative for abdominal pain, diarrhea, nausea and vomiting. Genitourinary:  Negative for dysuria and frequency. Musculoskeletal:  Positive for arthralgias and joint swelling. Negative for back pain. Skin:  Negative for rash and wound. Neurological:  Negative for weakness and headaches. Hematological:  Negative for adenopathy. Psychiatric/Behavioral: Negative. All other systems reviewed and are negative. Patient's medical, social, and family history reviewed    Past Medical History:  has a past medical history of Asthma, Diabetes mellitus ([address] Hypertension, and Prolonged emergence from general anesthesia. Past Surgical History:  has a past surgical history that includes Tonsillectomy; Appendectomy; Foot surgery; cyst removal; Colonoscopy (N/A, 4/15/2021); and Upper gastrointestinal endoscopy (N/A, 4/15/2021). Social History:  reports that he quit smoking about 22 years ago. His smoking use included cigarettes. He started smoking about 40 years ago. He has a 34.00 pack-year smoking history. He has never used smokeless tobacco. He reports that he does not currently use drugs. He reports that he does not drink alcohol. Family History: family history includes Cancer in his mother; Diabetes in his mother. Allergies: Ciprofloxacin, Levofloxacin, Penicillins, Sulfamethoxazole-trimethoprim, and Biaxin [clarithromycin]    Physical Exam   Vital Signs:  /82 (Site: Right Upper Arm, Position: Sitting)   Pulse 89   Temp 98.2 °F (36.8 °C) (Temporal)   Wt (!) 423 lb 6.4 oz (192.1 kg)   SpO2 98%   BMI 55.86 kg/m²    Oxygen Saturation Interpretation: Normal.    Physical Exam  Vitals and nursing note reviewed. Constitutional:       Appearance: He is well-developed. HENT:      Head: Normocephalic and atraumatic. Eyes:      Pupils: Pupils are equal, round, and reactive to light. Cardiovascular:      Rate and Rhythm: Normal rate and regular rhythm. Heart sounds: Normal heart sounds.  No murmur heard.  Pulmonary:      Effort: Pulmonary effort is normal. No respiratory distress. Breath sounds: Normal breath sounds. No wheezing or rales. Abdominal:      General: Bowel sounds are normal.      Palpations: Abdomen is soft. Tenderness: There is no abdominal tenderness. There is no guarding or rebound. Musculoskeletal:      Right wrist: Swelling, tenderness and bony tenderness present. Decreased range of motion. Cervical back: Normal range of motion and neck supple. Skin:     General: Skin is warm and dry. Neurological:      Mental Status: He is alert and oriented to person, place, and time. Cranial Nerves: No cranial nerve deficit. Coordination: Coordination normal.       Test Results Section   (All laboratory and radiology results have been personally reviewed by myself)  Labs:  No results found for this visit on 08/29/22. Imaging: All Radiology results interpreted by Radiologist unless otherwise noted. No results found. Assessment / Plan   Impression(s):  Crystal Kam was seen today for wrist pain. Diagnoses and all orders for this visit:    Right wrist sprain, initial encounter  -     XR WRIST RIGHT (MIN 3 VIEWS)        Discharged home. Patient condition is good    No follow-ups on file. New Medications     New Prescriptions    No medications on file   We will call the patient with the final wrist xray result. Home Advil for pain.     Electronically signed by Rosaline Hunter DO   DD: 8/29/22

## 2022-10-01 ENCOUNTER — OFFICE VISIT (OUTPATIENT)
Dept: FAMILY MEDICINE CLINIC | Age: 57
End: 2022-10-01
Payer: COMMERCIAL

## 2022-10-01 VITALS
SYSTOLIC BLOOD PRESSURE: 134 MMHG | OXYGEN SATURATION: 96 % | TEMPERATURE: 97.8 F | HEIGHT: 73 IN | HEART RATE: 90 BPM | DIASTOLIC BLOOD PRESSURE: 84 MMHG | BODY MASS INDEX: 41.75 KG/M2 | WEIGHT: 315 LBS

## 2022-10-01 DIAGNOSIS — U07.1 COVID-19: Primary | ICD-10-CM

## 2022-10-01 DIAGNOSIS — R09.81 NASAL CONGESTION: ICD-10-CM

## 2022-10-01 LAB
INFLUENZA A ANTIBODY: NORMAL
INFLUENZA B ANTIBODY: NORMAL
Lab: ABNORMAL
PERFORMING INSTRUMENT: ABNORMAL
QC PASS/FAIL: ABNORMAL
S PYO AG THROAT QL: NORMAL
SARS-COV-2, POC: DETECTED

## 2022-10-01 PROCEDURE — 1036F TOBACCO NON-USER: CPT | Performed by: FAMILY MEDICINE

## 2022-10-01 PROCEDURE — 99213 OFFICE O/P EST LOW 20 MIN: CPT | Performed by: FAMILY MEDICINE

## 2022-10-01 PROCEDURE — 87804 INFLUENZA ASSAY W/OPTIC: CPT | Performed by: FAMILY MEDICINE

## 2022-10-01 PROCEDURE — 3017F COLORECTAL CA SCREEN DOC REV: CPT | Performed by: FAMILY MEDICINE

## 2022-10-01 PROCEDURE — G8484 FLU IMMUNIZE NO ADMIN: HCPCS | Performed by: FAMILY MEDICINE

## 2022-10-01 PROCEDURE — 87880 STREP A ASSAY W/OPTIC: CPT | Performed by: FAMILY MEDICINE

## 2022-10-01 PROCEDURE — G8427 DOCREV CUR MEDS BY ELIG CLIN: HCPCS | Performed by: FAMILY MEDICINE

## 2022-10-01 PROCEDURE — G8417 CALC BMI ABV UP PARAM F/U: HCPCS | Performed by: FAMILY MEDICINE

## 2022-10-01 PROCEDURE — 87426 SARSCOV CORONAVIRUS AG IA: CPT | Performed by: FAMILY MEDICINE

## 2022-10-01 RX ORDER — PREDNISONE 10 MG/1
TABLET ORAL
Qty: 18 TABLET | Refills: 0 | Status: SHIPPED
Start: 2022-10-01 | End: 2022-11-02 | Stop reason: SDUPTHER

## 2022-10-01 ASSESSMENT — ENCOUNTER SYMPTOMS
EYE DISCHARGE: 0
SORE THROAT: 1
BACK PAIN: 0
VOMITING: 0
WHEEZING: 0
NAUSEA: 0
SHORTNESS OF BREATH: 0
COUGH: 0
ABDOMINAL PAIN: 0
SINUS PRESSURE: 1
DIARRHEA: 0
CONSTIPATION: 0
SINUS PAIN: 0
RHINORRHEA: 0

## 2022-10-01 NOTE — PROGRESS NOTES
10/1/22  Bello May : 1965 Sex: male  Age: 64 y.o. Chief Complaint   Patient presents with    Congestion     X1 week. States children sick. Drainage    Sinus Problem     Pain and pressure      HPI:  64 y.o. male presents for acute visit due to URI symptoms. Upper Respiratory Symptoms  Patient complains of congestion, sore throat, post nasal drip, and bilateral sinus pain. Patient denies anorexia, chest pain, chills, dizziness, fatigue, fevers, myalgias, nausea, and shortness of breath. He has had symptoms for 1 week. Symptoms have waxed and waned since that time. ROS:  Review of Systems   Constitutional:  Negative for appetite change, chills and fever. HENT:  Positive for congestion, postnasal drip, sinus pressure and sore throat. Negative for ear pain, rhinorrhea and sinus pain. Eyes:  Negative for discharge. Respiratory:  Negative for cough, shortness of breath and wheezing. Cardiovascular:  Negative for chest pain and palpitations. Gastrointestinal:  Negative for abdominal pain, constipation, diarrhea, nausea and vomiting. Musculoskeletal:  Negative for back pain. Skin:  Negative for rash. Neurological:  Negative for dizziness and headaches. Hematological:  Negative for adenopathy. All other systems reviewed and are negative. Current Outpatient Medications on File Prior to Visit   Medication Sig Dispense Refill    ciclopirox (LOPROX) 0.77 % cream APPLY CREAM TOPICALLY TWICE DAILY FOR 14 DAYS AS NEEDED FOR OUTBREAK AS DIRECTED      clotrimazole-betamethasone (LOTRISONE) 1-0.05 % cream Apply topically 2 times daily.  45 g 0    fluticasone (FLONASE) 50 MCG/ACT nasal spray 2 sprays by Each Nostril route daily 48 g 1    azelastine (ASTELIN) 0.1 % nasal spray 1 spray by Nasal route 2 times daily Use in each nostril as directed 90 mL 1    pioglitazone (ACTOS) 30 MG tablet Take 1/2 (one-half) tablet by mouth once daily 45 tablet 5    lisinopril (PRINIVIL;ZESTRIL) 40 MG tablet Take 1/2 (one-half) tablet by mouth twice daily 90 tablet 1    clindamycin (CLEOCIN T) 1 % lotion APPLY TO AFFECTED AREA DAILY AS DIRECTED      pantoprazole (PROTONIX) 20 MG tablet TAKE 1 TABLET BY MOUTH ONCE DAILY      albuterol sulfate  (90 Base) MCG/ACT inhaler Inhale 2 puffs into the lungs every 6 hours as needed for Wheezing      montelukast (SINGULAIR) 10 MG tablet Take 10 mg by mouth nightly. albuterol (PROVENTIL) (2.5 MG/3ML) 0.083% nebulizer solution Take 2.5 mg by nebulization every 6 hours as needed. No current facility-administered medications on file prior to visit.        Allergies   Allergen Reactions    Ciprofloxacin     Levofloxacin Other (See Comments)     pain    Penicillins Hives    Sulfamethoxazole-Trimethoprim      UPSET STOAMCH    Biaxin [Clarithromycin] Other (See Comments)     Racing heart         Past Medical History:   Diagnosis Date    Asthma     Diabetes mellitus ([address]     Hypertension     Prolonged emergence from general anesthesia      Past Surgical History:   Procedure Laterality Date    APPENDECTOMY      COLONOSCOPY N/A 4/15/2021    COLONOSCOPY WITH BIOPSY performed by Bonnie Hernandez DO at [facility] [address] ENDOSCOPY N/A 4/15/2021    EGD ESOPHAGOGASTRODUODENOSCOPY DILATATION performed by Bonnie Hernandez DO at [facility] ENDOSCOPY     Family History   Problem Relation Age of Onset    Cancer Mother     Diabetes Mother      Social History       Tobacco History       Smoking Status  Former Smoking Start Date  1982 Quit Date  12/4/1999 Smoking Frequency  2.00 packs/day for 17.00 years (34.00 pk-yrs)    Smoking Tobacco Type  Cigarettes from 1982 to 12/4/1999      Smokeless Tobacco Use  Never                     Vitals:    10/01/22 1208   BP: 134/84   Pulse: 90   Temp: 97.8 °F (36.6 °C)   SpO2: 96%   Weight: (!) 423 lb (191.9 kg)   Height: 6' 1\" (1.854 m)       Physical Exam:  Physical Exam  Vitals and nursing note reviewed. Constitutional:       General: He is not in acute distress. Appearance: Normal appearance. He is well-developed. He is morbidly obese. HENT:      Head: Normocephalic and atraumatic. Right Ear: Hearing, tympanic membrane, ear canal and external ear normal. There is no impacted cerumen. Left Ear: Hearing, tympanic membrane, ear canal and external ear normal. There is no impacted cerumen. Nose: Congestion present. No mucosal edema or rhinorrhea. Right Sinus: No maxillary sinus tenderness or frontal sinus tenderness. Left Sinus: No maxillary sinus tenderness or frontal sinus tenderness. Mouth/Throat:      Mouth: Mucous membranes are moist.      Pharynx: Oropharynx is clear. No oropharyngeal exudate or posterior oropharyngeal erythema. Eyes:      General:         Right eye: No discharge. Left eye: No discharge. Extraocular Movements: Extraocular movements intact. Conjunctiva/sclera: Conjunctivae normal.   Cardiovascular:      Rate and Rhythm: Normal rate and regular rhythm. Heart sounds: Normal heart sounds. No murmur heard. Pulmonary:      Effort: Pulmonary effort is normal. No respiratory distress. Breath sounds: Normal breath sounds. No wheezing. Musculoskeletal:         General: Normal range of motion. Cervical back: Normal range of motion and neck supple. No tenderness. Right lower leg: No edema. Left lower leg: No edema. Lymphadenopathy:      Cervical: No cervical adenopathy. Skin:     General: Skin is warm and dry. Findings: No rash. Neurological:      General: No focal deficit present. Mental Status: He is alert and oriented to person, place, and time. Gait: Gait normal.   Psychiatric:         Mood and Affect: Mood normal.         Behavior: Behavior normal.         Thought Content:  Thought content normal.       Results for orders placed or performed in visit on

## 2022-11-02 ENCOUNTER — OFFICE VISIT (OUTPATIENT)
Dept: FAMILY MEDICINE CLINIC | Age: 57
End: 2022-11-02
Payer: COMMERCIAL

## 2022-11-02 VITALS
WEIGHT: 315 LBS | BODY MASS INDEX: 41.75 KG/M2 | DIASTOLIC BLOOD PRESSURE: 70 MMHG | HEIGHT: 73 IN | HEART RATE: 87 BPM | TEMPERATURE: 97.8 F | OXYGEN SATURATION: 96 % | SYSTOLIC BLOOD PRESSURE: 142 MMHG

## 2022-11-02 DIAGNOSIS — R09.82 POSTNASAL DRIP: ICD-10-CM

## 2022-11-02 DIAGNOSIS — J06.9 ACUTE UPPER RESPIRATORY INFECTION, UNSPECIFIED: ICD-10-CM

## 2022-11-02 DIAGNOSIS — R05.9 COUGH, UNSPECIFIED TYPE: ICD-10-CM

## 2022-11-02 DIAGNOSIS — J01.90 ACUTE NON-RECURRENT SINUSITIS, UNSPECIFIED LOCATION: Primary | ICD-10-CM

## 2022-11-02 DIAGNOSIS — R09.81 NASAL CONGESTION: ICD-10-CM

## 2022-11-02 PROCEDURE — 1036F TOBACCO NON-USER: CPT | Performed by: PHYSICIAN ASSISTANT

## 2022-11-02 PROCEDURE — 3017F COLORECTAL CA SCREEN DOC REV: CPT | Performed by: PHYSICIAN ASSISTANT

## 2022-11-02 PROCEDURE — G8427 DOCREV CUR MEDS BY ELIG CLIN: HCPCS | Performed by: PHYSICIAN ASSISTANT

## 2022-11-02 PROCEDURE — G8484 FLU IMMUNIZE NO ADMIN: HCPCS | Performed by: PHYSICIAN ASSISTANT

## 2022-11-02 PROCEDURE — G8417 CALC BMI ABV UP PARAM F/U: HCPCS | Performed by: PHYSICIAN ASSISTANT

## 2022-11-02 PROCEDURE — 99213 OFFICE O/P EST LOW 20 MIN: CPT | Performed by: PHYSICIAN ASSISTANT

## 2022-11-02 RX ORDER — PREDNISONE 10 MG/1
TABLET ORAL
Qty: 18 TABLET | Refills: 0 | Status: SHIPPED | OUTPATIENT
Start: 2022-11-02 | End: 2022-11-11

## 2022-11-02 RX ORDER — TOBRAMYCIN 3 MG/ML
1 SOLUTION/ DROPS OPHTHALMIC EVERY 4 HOURS
Qty: 5 ML | Refills: 0 | Status: SHIPPED | OUTPATIENT
Start: 2022-11-02 | End: 2022-11-12

## 2022-11-02 RX ORDER — CLINDAMYCIN HYDROCHLORIDE 300 MG/1
300 CAPSULE ORAL 2 TIMES DAILY
Qty: 14 CAPSULE | Refills: 0 | Status: SHIPPED | OUTPATIENT
Start: 2022-11-02 | End: 2022-11-09

## 2022-11-02 NOTE — PROGRESS NOTES
22  Willie Chester : 1965 Sex: male  Age 64 y.o. Subjective:  Chief Complaint   Patient presents with    Headache     Sinus headache, pos covid x1 month ago    Otalgia    Congestion    Eye Drainage    Drainage         HPI:   Willie Chester , 64 y.o. male presents to express care for evaluation of headache, ear pain congestion, eye drainage and sore throat. HPI  43-year-old male presents to express care for evaluation of cough, congestion, drainage, eye drainage and congestion with headache. The patient has had the symptoms ongoing for about 2 or 3 days. The patient did have COVID about a month ago. The patient is not any chest pain, shortness of breath, abdominal pain. The patient states that this feels very similar to his previous sinus infections. The patient is not currently on any antibiotics. ROS:   Unless otherwise stated in this report the patient's positive and negative responses for review of systems for constitutional, eyes, ENT, cardiovascular, respiratory, gastrointestinal, neurological, , musculoskeletal, and integument systems and related systems to the presenting problem are either stated in the history of present illness or were not pertinent or were negative for the symptoms and/or complaints related to the presenting medical problem. Positives and pertinent negatives as per HPI. All others reviewed and are negative.       PMH:     Past Medical History:   Diagnosis Date    Asthma     Diabetes mellitus ([address]     Hypertension     Prolonged emergence from general anesthesia        Past Surgical History:   Procedure Laterality Date    APPENDECTOMY      COLONOSCOPY N/A 4/15/2021    COLONOSCOPY WITH BIOPSY performed by Delores Hinson DO at [address] [place] [address] GASTROINTESTINAL ENDOSCOPY N/A 4/15/2021    EGD ESOPHAGOGASTRODUODENOSCOPY DILATATION performed by Delores Hinson DO at [facility] ENDOSCOPY       Family History Problem Relation Age of Onset    Cancer Mother     Diabetes Mother        Medications:     Current Outpatient Medications:     clindamycin (CLEOCIN) 300 MG capsule, Take 1 capsule by mouth in the morning and at bedtime for 7 days, Disp: 14 capsule, Rfl: 0    tobramycin (TOBREX) 0.3 % ophthalmic solution, Place 1 drop into both eyes every 4 hours for 10 days, Disp: 5 mL, Rfl: 0    predniSONE (DELTASONE) 10 MG tablet, Take 3 tablets by mouth daily for 3 days, THEN 2 tablets daily for 3 days, THEN 1 tablet daily for 3 days. , Disp: 18 tablet, Rfl: 0    ciclopirox (LOPROX) 0.77 % cream, APPLY CREAM TOPICALLY TWICE DAILY FOR 14 DAYS AS NEEDED FOR OUTBREAK AS DIRECTED, Disp: , Rfl:     clotrimazole-betamethasone (LOTRISONE) 1-0.05 % cream, Apply topically 2 times daily. , Disp: 45 g, Rfl: 0    fluticasone (FLONASE) 50 MCG/ACT nasal spray, 2 sprays by Each Nostril route daily, Disp: 48 g, Rfl: 1    azelastine (ASTELIN) 0.1 % nasal spray, 1 spray by Nasal route 2 times daily Use in each nostril as directed, Disp: 90 mL, Rfl: 1    pioglitazone (ACTOS) 30 MG tablet, Take 1/2 (one-half) tablet by mouth once daily, Disp: 45 tablet, Rfl: 5    lisinopril (PRINIVIL;ZESTRIL) 40 MG tablet, Take 1/2 (one-half) tablet by mouth twice daily, Disp: 90 tablet, Rfl: 1    clindamycin (CLEOCIN T) 1 % lotion, APPLY TO AFFECTED AREA DAILY AS DIRECTED, Disp: , Rfl:     pantoprazole (PROTONIX) 20 MG tablet, TAKE 1 TABLET BY MOUTH ONCE DAILY, Disp: , Rfl:     albuterol sulfate  (90 Base) MCG/ACT inhaler, Inhale 2 puffs into the lungs every 6 hours as needed for Wheezing, Disp: , Rfl:     montelukast (SINGULAIR) 10 MG tablet, Take 10 mg by mouth nightly., Disp: , Rfl:     albuterol (PROVENTIL) (2.5 MG/3ML) 0.083% nebulizer solution, Take 2.5 mg by nebulization every 6 hours as needed. , Disp: , Rfl:     Allergies:      Allergies   Allergen Reactions    Ciprofloxacin     Levofloxacin Other (See Comments)     pain    Penicillins Hives Sulfamethoxazole-Trimethoprim      UPSET STOAMCH    Biaxin [Clarithromycin] Other (See Comments)     Racing heart         Social History:     Social History     Tobacco Use    Smoking status: Former     Packs/day: 2.00     Years: 17.00     Pack years: 34.00     Types: Cigarettes     Start date:      Quit date: 1999     Years since quittin.9    Smokeless tobacco: Never   Vaping Use    Vaping Use: Never used   Substance Use Topics    Alcohol use: No    Drug use: Not Currently       Patient lives at home. Physical Exam:     Vitals:    22 1208   BP: (!) 142/70   Pulse: 87   Temp: 97.8 °F (36.6 °C)   SpO2: 96%   Weight: (!) 423 lb (191.9 kg)   Height: 6' 1\" (1.854 m)       Exam:  Physical Exam  Nurse's notes and vital signs reviewed. The patient is not hypoxic. ? General: Alert, no acute distress, patient resting comfortably Patient is not toxic or lethargic. Skin: Warm, intact, no pallor noted. There is no evidence of rash at this time. Head: Normocephalic, atraumatic  Eye: Normal conjunctiva  Ears, Nose, Throat: Right tympanic membrane clear, left tympanic membrane clear. No drainage or discharge noted. No pre- or post-auricular tenderness, erythema, or swelling noted. Nasal congestion, rhinorrhea, no epistaxis, no evidence of foreign body or erythema. No blood. Posterior oropharynx shows erythema and cobblestoning but no evidence of tonsillar hypertrophy, or exudate. the uvula is midline. No trismus or drooling is noted. Moist mucous membranes. Neck: No anterior/posterior lymphadenopathy noted. No erythema, no masses, no fluctuance or induration noted. No meningeal signs. Cardiovascular: Regular Rate and Rhythm  Respiratory: No acute distress, no rhonchi, wheezing or crackles noted. No stridor or retractions are noted.   Neurological: A&O x4, normal speech  Psychiatric: Cooperative       Testing:           Medical Decision Making:     Vital signs reviewed    Past medical history reviewed. Allergies reviewed. Medications reviewed. Patient on arrival does not appear to be in any apparent distress or discomfort. The patient has been seen and evaluated. The patient does not appear to be toxic or lethargic. The patient will be treated with clindamycin. The patient states that he can only do the twice a day regimen. This is what his PCP typically does. The patient also will be given a low dose of the prednisone. The patient will be given the tobramycin ophthalmic drops. The patient was educated on the proper dosage of motrin and tylenol and the appropriate intervals of each. The patient is to increase fluid intake over the next several days. The patient is to use OTC decongestant as needed. The patient is to return to express care or go directly to the emergency department should any of the signs or symptoms worsen. The patient is to followup with primary care physician in 2-3 days for repeat evaluation. The patient has no other questions or concerns at this time the patient will be discharged home. Clinical Impression:   Vamsi Torres was seen today for headache, otalgia, congestion, eye drainage and drainage. Diagnoses and all orders for this visit:    Acute non-recurrent sinusitis, unspecified location    Postnasal drip    Acute upper respiratory infection, unspecified    Nasal congestion    Cough, unspecified type    Other orders  -     clindamycin (CLEOCIN) 300 MG capsule; Take 1 capsule by mouth in the morning and at bedtime for 7 days  -     tobramycin (TOBREX) 0.3 % ophthalmic solution; Place 1 drop into both eyes every 4 hours for 10 days  -     predniSONE (DELTASONE) 10 MG tablet; Take 3 tablets by mouth daily for 3 days, THEN 2 tablets daily for 3 days, THEN 1 tablet daily for 3 days. The patient is to call for any concerns or return if any of the signs or symptoms worsen.  The patient is to follow-up with PCP in the next 2-3 days for repeat evaluation repeat assessment or go directly to the emergency department.      SIGNATURE: Jessica May III, PATIFFANIE

## 2022-11-03 LAB
ALBUMIN SERPL-MCNC: 4.3 G/DL (ref 3.5–5.2)
ALP BLD-CCNC: 84 U/L (ref 40–129)
ALT SERPL-CCNC: 16 U/L (ref 0–40)
AST SERPL-CCNC: 15 U/L (ref 0–39)
BILIRUB SERPL-MCNC: 0.4 MG/DL (ref 0–1.2)
BILIRUBIN DIRECT: <0.2 MG/DL (ref 0–0.3)
BILIRUBIN, INDIRECT: NORMAL MG/DL (ref 0–1)
HCT VFR BLD CALC: 45.4 % (ref 37–54)
HEMOGLOBIN: 14.3 G/DL (ref 12.5–16.5)
PROSTATE SPECIFIC ANTIGEN: 2.02 NG/ML (ref 0–4)
TESTOSTERONE TOTAL: 433.7 NG/DL
TOTAL PROTEIN: 7.2 G/DL (ref 6.4–8.3)

## 2022-11-08 DIAGNOSIS — I15.9 SECONDARY HYPERTENSION: ICD-10-CM

## 2022-11-08 DIAGNOSIS — E11.9 TYPE 2 DIABETES MELLITUS WITHOUT COMPLICATION, WITHOUT LONG-TERM CURRENT USE OF INSULIN (HCC): ICD-10-CM

## 2022-11-08 RX ORDER — LISINOPRIL 40 MG/1
TABLET ORAL
Qty: 90 TABLET | Refills: 0 | Status: SHIPPED | OUTPATIENT
Start: 2022-11-08

## 2022-11-08 RX ORDER — PIOGLITAZONEHYDROCHLORIDE 30 MG/1
TABLET ORAL
Qty: 45 TABLET | Refills: 0 | Status: SHIPPED | OUTPATIENT
Start: 2022-11-08

## 2022-12-06 ENCOUNTER — OFFICE VISIT (OUTPATIENT)
Dept: ENT CLINIC | Age: 57
End: 2022-12-06
Payer: COMMERCIAL

## 2022-12-06 VITALS
BODY MASS INDEX: 41.75 KG/M2 | DIASTOLIC BLOOD PRESSURE: 76 MMHG | HEIGHT: 73 IN | SYSTOLIC BLOOD PRESSURE: 126 MMHG | WEIGHT: 315 LBS | HEART RATE: 103 BPM

## 2022-12-06 DIAGNOSIS — R09.82 POST-NASAL DRAINAGE: ICD-10-CM

## 2022-12-06 DIAGNOSIS — H60.8X3 CHRONIC ECZEMATOUS OTITIS EXTERNA OF BOTH EARS: ICD-10-CM

## 2022-12-06 DIAGNOSIS — J30.9 ALLERGIC RHINITIS, UNSPECIFIED SEASONALITY, UNSPECIFIED TRIGGER: Primary | ICD-10-CM

## 2022-12-06 PROCEDURE — G8427 DOCREV CUR MEDS BY ELIG CLIN: HCPCS | Performed by: NURSE PRACTITIONER

## 2022-12-06 PROCEDURE — G8484 FLU IMMUNIZE NO ADMIN: HCPCS | Performed by: NURSE PRACTITIONER

## 2022-12-06 PROCEDURE — G8417 CALC BMI ABV UP PARAM F/U: HCPCS | Performed by: NURSE PRACTITIONER

## 2022-12-06 PROCEDURE — 99213 OFFICE O/P EST LOW 20 MIN: CPT | Performed by: NURSE PRACTITIONER

## 2022-12-06 PROCEDURE — 3017F COLORECTAL CA SCREEN DOC REV: CPT | Performed by: NURSE PRACTITIONER

## 2022-12-06 PROCEDURE — 1036F TOBACCO NON-USER: CPT | Performed by: NURSE PRACTITIONER

## 2022-12-06 ASSESSMENT — ENCOUNTER SYMPTOMS
RESPIRATORY NEGATIVE: 1
SINUS PAIN: 0
RHINORRHEA: 0
STRIDOR: 0
SINUS PRESSURE: 0
EYES NEGATIVE: 1
SHORTNESS OF BREATH: 0
SORE THROAT: 0

## 2022-12-06 NOTE — PROGRESS NOTES
[facility] Otolaryngology  Dr. Willie Barrett. Louisa Harman. Ms.Ed        Patient Name:  London Bautista  :  1965     CHIEF C/O:    Chief Complaint   Patient presents with    Follow-up     6 month allergy       HISTORY OBTAINED FROM:  patient    HISTORY OF PRESENT ILLNESS:       Christian Ovalle is a 62y.o. year old male, here today for follow up of allergy symptoms. Patient was last seen 6 months ago and is currently using Singulair and Allegra daily for his allergy symptoms. Patient has been on Flonase in the past but was stopped due to elevated intraocular pressure. His insurance continues to deny Astelin patient states his symptoms have been well controlled on the current medication regimen. He currently denies any significant congestion, rhinorrhea, or postnasal drainage. He states he did have COVID 2 to 3 months ago with lingering symptoms for approximately 1 month that have resolved. He does complain of intermittent sensation of fluid within the ear canals when he wakes in the morning with intermittent itching. He states that his previous doctor, Dr. Ponce Flores told him to use hydrocortisone cream which relieves the symptoms.       Past Medical History:   Diagnosis Date    Asthma     Diabetes mellitus ([address]     Hypertension     Prolonged emergence from general anesthesia      Past Surgical History:   Procedure Laterality Date    APPENDECTOMY      COLONOSCOPY N/A 4/15/2021    COLONOSCOPY WITH BIOPSY performed by Ronnie Aguilera DO at [address] [place] [address] GASTROINTESTINAL ENDOSCOPY N/A 4/15/2021    EGD ESOPHAGOGASTRODUODENOSCOPY DILATATION performed by Ronnie Aguilera DO at [facility] ENDOSCOPY       Current Outpatient Medications:     pioglitazone (ACTOS) 30 MG tablet, Take 1/2 (one-half) tablet by mouth once daily, Disp: 45 tablet, Rfl: 0    lisinopril (PRINIVIL;ZESTRIL) 40 MG tablet, Take 1/2 (one-half) tablet by mouth twice daily, Disp: 90 tablet, Rfl: 0    ciclopirox (LOPROX) 0.77 % cream, APPLY CREAM TOPICALLY TWICE DAILY FOR 14 DAYS AS NEEDED FOR OUTBREAK AS DIRECTED, Disp: , Rfl:     clotrimazole-betamethasone (LOTRISONE) 1-0.05 % cream, Apply topically 2 times daily. , Disp: 45 g, Rfl: 0    fluticasone (FLONASE) 50 MCG/ACT nasal spray, 2 sprays by Each Nostril route daily, Disp: 48 g, Rfl: 1    azelastine (ASTELIN) 0.1 % nasal spray, 1 spray by Nasal route 2 times daily Use in each nostril as directed, Disp: 90 mL, Rfl: 1    clindamycin (CLEOCIN T) 1 % lotion, APPLY TO AFFECTED AREA DAILY AS DIRECTED, Disp: , Rfl:     pantoprazole (PROTONIX) 20 MG tablet, TAKE 1 TABLET BY MOUTH ONCE DAILY, Disp: , Rfl:     albuterol sulfate  (90 Base) MCG/ACT inhaler, Inhale 2 puffs into the lungs every 6 hours as needed for Wheezing, Disp: , Rfl:     montelukast (SINGULAIR) 10 MG tablet, Take 10 mg by mouth nightly., Disp: , Rfl:     albuterol (PROVENTIL) (2.5 MG/3ML) 0.083% nebulizer solution, Take 2.5 mg by nebulization every 6 hours as needed. , Disp: , Rfl:   Ciprofloxacin, Levofloxacin, Penicillins, Sulfamethoxazole-trimethoprim, and Biaxin [clarithromycin]  Social History     Tobacco Use    Smoking status: Former     Packs/day: 2.00     Years: 17.00     Pack years: 34.00     Types: Cigarettes     Start date: 18     Quit date: 1999     Years since quittin.0    Smokeless tobacco: Never   Vaping Use    Vaping Use: Never used   Substance Use Topics    Alcohol use: No    Drug use: Not Currently     Family History   Problem Relation Age of Onset    Cancer Mother     Diabetes Mother        Review of Systems   Constitutional: Negative. Negative for activity change and appetite change. HENT:  Negative for congestion, ear discharge, ear pain, postnasal drip, rhinorrhea, sinus pressure, sinus pain and sore throat. Eyes: Negative. Respiratory: Negative. Negative for shortness of breath and stridor. Cardiovascular: Negative. Negative for chest pain and palpitations. Endocrine: Negative. Musculoskeletal: Negative. Skin: Negative. Neurological: Negative. Negative for dizziness. Hematological: Negative. Psychiatric/Behavioral: Negative. /76   Pulse (!) 103   Ht 6' 1\" (1.854 m)   Wt (!) 423 lb (191.9 kg)   BMI 55.81 kg/m²   Physical Exam  Constitutional:       Appearance: Normal appearance. HENT:      Head: Normocephalic. Right Ear: Tympanic membrane, ear canal and external ear normal.      Left Ear: Tympanic membrane, ear canal and external ear normal.      Ears:        Nose: Nose normal. No rhinorrhea. Right Turbinates: Swollen. Left Turbinates: Swollen. Mouth/Throat:      Lips: Pink. Mouth: Mucous membranes are moist.     Eyes:      Conjunctiva/sclera: Conjunctivae normal.      Pupils: Pupils are equal, round, and reactive to light. Cardiovascular:      Rate and Rhythm: Normal rate and regular rhythm. Pulses: Normal pulses. Pulmonary:      Effort: Pulmonary effort is normal. No respiratory distress. Breath sounds: No stridor. Musculoskeletal:         General: Normal range of motion. Cervical back: Normal range of motion. No rigidity. No muscular tenderness. Skin:     General: Skin is warm and dry. Neurological:      General: No focal deficit present. Mental Status: He is alert and oriented to person, place, and time. Psychiatric:         Mood and Affect: Mood normal.         Behavior: Behavior normal.         Thought Content: Thought content normal.         Judgment: Judgment normal.       IMPRESSION/PLAN:    Rometta Aase was seen today for follow-up. Diagnoses and all orders for this visit:    Allergic rhinitis, unspecified seasonality, unspecified trigger    Post-nasal drainage    Chronic eczematous otitis externa of both ears      At this time patient will continue with his Singulair, 10 mg once daily with Allegra, 180 mg once daily for his ongoing treatment of his allergy symptoms. Patient has used Astelin spray in the past but insurance will not cover. He is directed that this can be purchased over-the-counter in the form of Astepro with the same directions, 1 to 2 sprays each nostril twice daily as needed for his congestion and postnasal drainage symptoms. We will continue with intermittent hydrocortisone cream to bilateral ear canals for chronic eczematoid otitis externa. He states he will explore this option. He will otherwise follow-up in 1 year for reevaluation. He is instructed to call with any new or worsening symptoms prior to his next appointment.       ALVARO Graham, FNP-C  [facility], Nose and Throat    The information contained in this note has been dictated using drug and medical speech recognition software and may contain errors

## 2022-12-12 ENCOUNTER — OFFICE VISIT (OUTPATIENT)
Dept: FAMILY MEDICINE CLINIC | Age: 57
End: 2022-12-12
Payer: COMMERCIAL

## 2022-12-12 VITALS
BODY MASS INDEX: 55.81 KG/M2 | DIASTOLIC BLOOD PRESSURE: 80 MMHG | HEART RATE: 90 BPM | WEIGHT: 315 LBS | OXYGEN SATURATION: 96 % | TEMPERATURE: 98.6 F | SYSTOLIC BLOOD PRESSURE: 142 MMHG

## 2022-12-12 DIAGNOSIS — J20.9 ACUTE BRONCHITIS, UNSPECIFIED ORGANISM: Primary | ICD-10-CM

## 2022-12-12 DIAGNOSIS — R05.9 COUGH, UNSPECIFIED TYPE: ICD-10-CM

## 2022-12-12 PROCEDURE — 1036F TOBACCO NON-USER: CPT | Performed by: PHYSICIAN ASSISTANT

## 2022-12-12 PROCEDURE — G8484 FLU IMMUNIZE NO ADMIN: HCPCS | Performed by: PHYSICIAN ASSISTANT

## 2022-12-12 PROCEDURE — 3017F COLORECTAL CA SCREEN DOC REV: CPT | Performed by: PHYSICIAN ASSISTANT

## 2022-12-12 PROCEDURE — G8417 CALC BMI ABV UP PARAM F/U: HCPCS | Performed by: PHYSICIAN ASSISTANT

## 2022-12-12 PROCEDURE — 99214 OFFICE O/P EST MOD 30 MIN: CPT | Performed by: PHYSICIAN ASSISTANT

## 2022-12-12 PROCEDURE — G8427 DOCREV CUR MEDS BY ELIG CLIN: HCPCS | Performed by: PHYSICIAN ASSISTANT

## 2022-12-12 PROCEDURE — 3006F CXR DOC REV: CPT | Performed by: PHYSICIAN ASSISTANT

## 2022-12-12 RX ORDER — PREDNISONE 10 MG/1
TABLET ORAL
Qty: 18 TABLET | Refills: 0 | Status: SHIPPED | OUTPATIENT
Start: 2022-12-12

## 2022-12-12 RX ORDER — ALBUTEROL SULFATE 90 UG/1
2 AEROSOL, METERED RESPIRATORY (INHALATION) 4 TIMES DAILY PRN
Qty: 18 G | Refills: 0 | Status: SHIPPED | OUTPATIENT
Start: 2022-12-12

## 2022-12-12 RX ORDER — CLARITHROMYCIN 500 MG/1
500 TABLET, COATED ORAL 2 TIMES DAILY
Qty: 20 TABLET | Refills: 0 | Status: SHIPPED | OUTPATIENT
Start: 2022-12-12 | End: 2022-12-22

## 2022-12-12 NOTE — PROGRESS NOTES
22  Mery Vega : 1965 Sex: male  Age 62 y.o. Subjective:  Chief Complaint   Patient presents with    Shortness of Breath     Sob upon exertion, crackling when breathing    Cough     Negative at home covid test, had covid in October, flu in november         HPI:   Mery Vega , 62 y.o. male presents to express care for evaluation of cough, congestion, drainage, shortness of breath    HPI  78-year-old male presents to express care for evaluation cough, congestion, drainage, shortness of breath. The patient's had the symptoms ongoing for the last couple of days. The patient states seem to be getting worse over the weekend. Noted some crackling in his lungs. The patient is not having any fevers. The patient states that he is not really having any shortness of breath with light exertion but if he were to carry 30 pounds up and down the stairs he becomes increasingly short of breath. No chest pain. The patient did have COVID in October. The patient had flu in November. ROS:   Unless otherwise stated in this report the patient's positive and negative responses for review of systems for constitutional, eyes, ENT, cardiovascular, respiratory, gastrointestinal, neurological, , musculoskeletal, and integument systems and related systems to the presenting problem are either stated in the history of present illness or were not pertinent or were negative for the symptoms and/or complaints related to the presenting medical problem. Positives and pertinent negatives as per HPI. All others reviewed and are negative.       PMH:     Past Medical History:   Diagnosis Date    Asthma     Diabetes mellitus ([address]     Hypertension     Prolonged emergence from general anesthesia        Past Surgical History:   Procedure Laterality Date    APPENDECTOMY      COLONOSCOPY N/A 4/15/2021    COLONOSCOPY WITH BIOPSY performed by Ole Barnett DO at [place] [id number] TONSILLECTOMY      UPPER GASTROINTESTINAL ENDOSCOPY N/A 4/15/2021    EGD ESOPHAGOGASTRODUODENOSCOPY DILATATION performed by Sofia Nick DO at [facility] ENDOSCOPY       Family History   Problem Relation Age of Onset    Cancer Mother     Diabetes Mother        Medications:     Current Outpatient Medications:     clarithromycin (BIAXIN) 500 MG tablet, Take 1 tablet by mouth 2 times daily for 10 days, Disp: 20 tablet, Rfl: 0    predniSONE (DELTASONE) 10 MG tablet, 3 tabs once daily for 3 days, 2 tabs once daily for 3 days, 1 tab once daily for 3 days, Disp: 18 tablet, Rfl: 0    albuterol sulfate HFA (PROVENTIL;VENTOLIN;PROAIR) 108 (90 Base) MCG/ACT inhaler, Inhale 2 puffs into the lungs 4 times daily as needed for Wheezing, Disp: 18 g, Rfl: 0    pioglitazone (ACTOS) 30 MG tablet, Take 1/2 (one-half) tablet by mouth once daily, Disp: 45 tablet, Rfl: 0    lisinopril (PRINIVIL;ZESTRIL) 40 MG tablet, Take 1/2 (one-half) tablet by mouth twice daily, Disp: 90 tablet, Rfl: 0    ciclopirox (LOPROX) 0.77 % cream, APPLY CREAM TOPICALLY TWICE DAILY FOR 14 DAYS AS NEEDED FOR OUTBREAK AS DIRECTED, Disp: , Rfl:     clotrimazole-betamethasone (LOTRISONE) 1-0.05 % cream, Apply topically 2 times daily. , Disp: 45 g, Rfl: 0    fluticasone (FLONASE) 50 MCG/ACT nasal spray, 2 sprays by Each Nostril route daily, Disp: 48 g, Rfl: 1    azelastine (ASTELIN) 0.1 % nasal spray, 1 spray by Nasal route 2 times daily Use in each nostril as directed, Disp: 90 mL, Rfl: 1    clindamycin (CLEOCIN T) 1 % lotion, APPLY TO AFFECTED AREA DAILY AS DIRECTED, Disp: , Rfl:     pantoprazole (PROTONIX) 20 MG tablet, TAKE 1 TABLET BY MOUTH ONCE DAILY, Disp: , Rfl:     albuterol sulfate  (90 Base) MCG/ACT inhaler, Inhale 2 puffs into the lungs every 6 hours as needed for Wheezing, Disp: , Rfl:     montelukast (SINGULAIR) 10 MG tablet, Take 10 mg by mouth nightly., Disp: , Rfl:     albuterol (PROVENTIL) (2.5 MG/3ML) 0.083% nebulizer solution, Take 2.5 mg by nebulization every 6 hours as needed. , Disp: , Rfl:     Allergies: Allergies   Allergen Reactions    Ciprofloxacin     Levofloxacin Other (See Comments)     pain    Penicillins Hives    Sulfamethoxazole-Trimethoprim      UPSET STOAMCH    Biaxin [Clarithromycin] Other (See Comments)     Racing heart         Social History:     Social History     Tobacco Use    Smoking status: Former     Packs/day: 2.00     Years: 17.00     Pack years: 34.00     Types: Cigarettes     Start date:      Quit date: 1999     Years since quittin.0    Smokeless tobacco: Never   Vaping Use    Vaping Use: Never used   Substance Use Topics    Alcohol use: No    Drug use: Not Currently       Patient lives at home. Physical Exam:     Vitals:    22 1549   BP: (!) 142/80   Site: Right Upper Arm   Position: Sitting   Pulse: 90   Temp: 98.6 °F (37 °C)   TempSrc: Temporal   SpO2: 96%   Weight: (!) 423 lb (191.9 kg)       Exam:  Physical Exam  Nurse's notes and vital signs reviewed. The patient is not hypoxic. ? General: Alert, no acute distress, patient resting comfortably Patient is not toxic or lethargic. Skin: Warm, intact, no pallor noted. There is no evidence of rash at this time. Head: Normocephalic, atraumatic  Eye: Normal conjunctiva  Ears, Nose, Throat: Right tympanic membrane clear, left tympanic membrane clear. No drainage or discharge noted. No pre- or post-auricular tenderness, erythema, or swelling noted. Nasal congestion, rhinorrhea, no epistaxis  Posterior oropharynx shows no erythema, tonsillar hypertrophy, or exudate. the uvula is midline. No trismus or drooling is noted. Moist mucous membranes. Neck: No anterior/posterior lymphadenopathy noted. No erythema, no masses, no fluctuance or induration noted. No meningeal signs.   Cardiovascular: Regular Rate and Rhythm  Respiratory: No acute distress, the patient does have some rhonchi and wheezing noted throughout, no significant crackles noted at this time. No stridor or retractions. Neurological: A&O x4, normal speech  Psychiatric: Cooperative       Testing:           Medical Decision Making:     Vital signs reviewed    Past medical history reviewed. Allergies reviewed. Medications reviewed. Patient on arrival does not appear to be in any apparent distress or discomfort. The patient has been seen and evaluated. The patient does not appear to be toxic or lethargic. The patient was sent for chest x-ray. X-ray pending at this time. We will treat the patient with Biaxin, prednisone, albuterol inhaler. The patient has Biaxin listed as an allergy but has tolerated well in the past.    The patient is to return to express care or go directly to the emergency department should any of the signs or symptoms worsen. The patient is to followup with primary care physician in 2-3 days for repeat evaluation. The patient has no other questions or concerns at this time the patient will be discharged home. Clinical Impression:   Marisa Blancas was seen today for shortness of breath and cough. Diagnoses and all orders for this visit:    Acute bronchitis, unspecified organism    Cough, unspecified type  -     XR CHEST STANDARD (2 VW); Future    Other orders  -     clarithromycin (BIAXIN) 500 MG tablet; Take 1 tablet by mouth 2 times daily for 10 days  -     predniSONE (DELTASONE) 10 MG tablet; 3 tabs once daily for 3 days, 2 tabs once daily for 3 days, 1 tab once daily for 3 days  -     albuterol sulfate HFA (PROVENTIL;VENTOLIN;PROAIR) 108 (90 Base) MCG/ACT inhaler; Inhale 2 puffs into the lungs 4 times daily as needed for Wheezing      The patient is to call for any concerns or return if any of the signs or symptoms worsen. The patient is to follow-up with PCP in the next 2-3 days for repeat evaluation repeat assessment or go directly to the emergency department.      SIGNATURE: Frank López III, PA-C

## 2022-12-19 ENCOUNTER — OFFICE VISIT (OUTPATIENT)
Dept: PRIMARY CARE CLINIC | Age: 57
End: 2022-12-19
Payer: COMMERCIAL

## 2022-12-19 VITALS
TEMPERATURE: 98.4 F | SYSTOLIC BLOOD PRESSURE: 136 MMHG | HEART RATE: 98 BPM | OXYGEN SATURATION: 97 % | WEIGHT: 315 LBS | HEIGHT: 73 IN | BODY MASS INDEX: 41.75 KG/M2 | DIASTOLIC BLOOD PRESSURE: 84 MMHG

## 2022-12-19 DIAGNOSIS — E11.9 TYPE 2 DIABETES MELLITUS WITHOUT COMPLICATION, WITHOUT LONG-TERM CURRENT USE OF INSULIN (HCC): ICD-10-CM

## 2022-12-19 DIAGNOSIS — I15.9 SECONDARY HYPERTENSION: Primary | ICD-10-CM

## 2022-12-19 DIAGNOSIS — K21.9 GASTROESOPHAGEAL REFLUX DISEASE, UNSPECIFIED WHETHER ESOPHAGITIS PRESENT: ICD-10-CM

## 2022-12-19 PROCEDURE — 1036F TOBACCO NON-USER: CPT | Performed by: FAMILY MEDICINE

## 2022-12-19 PROCEDURE — 2022F DILAT RTA XM EVC RTNOPTHY: CPT | Performed by: FAMILY MEDICINE

## 2022-12-19 PROCEDURE — 99214 OFFICE O/P EST MOD 30 MIN: CPT | Performed by: FAMILY MEDICINE

## 2022-12-19 PROCEDURE — 3044F HG A1C LEVEL LT 7.0%: CPT | Performed by: FAMILY MEDICINE

## 2022-12-19 PROCEDURE — G8427 DOCREV CUR MEDS BY ELIG CLIN: HCPCS | Performed by: FAMILY MEDICINE

## 2022-12-19 PROCEDURE — G8484 FLU IMMUNIZE NO ADMIN: HCPCS | Performed by: FAMILY MEDICINE

## 2022-12-19 PROCEDURE — G8417 CALC BMI ABV UP PARAM F/U: HCPCS | Performed by: FAMILY MEDICINE

## 2022-12-19 PROCEDURE — 3017F COLORECTAL CA SCREEN DOC REV: CPT | Performed by: FAMILY MEDICINE

## 2022-12-19 RX ORDER — BROMPHENIRAMINE MALEATE, PSEUDOEPHEDRINE HYDROCHLORIDE, AND DEXTROMETHORPHAN HYDROBROMIDE 2; 30; 10 MG/5ML; MG/5ML; MG/5ML
SYRUP ORAL
COMMUNITY
Start: 2022-12-17

## 2022-12-19 RX ORDER — CEFDINIR 300 MG/1
300 CAPSULE ORAL 2 TIMES DAILY
Qty: 20 CAPSULE | Refills: 0 | Status: SHIPPED | OUTPATIENT
Start: 2022-12-19 | End: 2022-12-29

## 2022-12-19 ASSESSMENT — ENCOUNTER SYMPTOMS
GASTROINTESTINAL NEGATIVE: 1
COUGH: 1
ALLERGIC/IMMUNOLOGIC NEGATIVE: 1
EYES NEGATIVE: 1

## 2022-12-19 NOTE — PROGRESS NOTES
22     Ila Daniels    : 1965 Sex: male   Age: 62 y.o. Chief Complaint   Patient presents with    New Patient    Cough       Prior to Admission medications    Medication Sig Start Date End Date Taking? Authorizing Provider   brompheniramine-pseudoephedrine-DM 2-30-10 MG/5ML syrup TAKE 10ML BY MOUTH EVERY 4 TO 6 HOURS AS NEEDED 22  Yes Historical Provider, MD   cefdinir (OMNICEF) 300 MG capsule Take 1 capsule by mouth 2 times daily for 10 days 22 Yes Chadwick Zapata DO   clarithromycin (BIAXIN) 500 MG tablet Take 1 tablet by mouth 2 times daily for 10 days 22 Yes DONATO Lew III   predniSONE (DELTASONE) 10 MG tablet 3 tabs once daily for 3 days, 2 tabs once daily for 3 days, 1 tab once daily for 3 days 22  Yes DONATO Lew III   albuterol sulfate HFA (PROVENTIL;VENTOLIN;PROAIR) 108 (90 Base) MCG/ACT inhaler Inhale 2 puffs into the lungs 4 times daily as needed for Wheezing 22  Yes DONATO Lew III   pioglitazone (ACTOS) 30 MG tablet Take 1/2 (one-half) tablet by mouth once daily 22  Yes Yamil Osorio DO   lisinopril (PRINIVIL;ZESTRIL) 40 MG tablet Take 1/2 (one-half) tablet by mouth twice daily 22  Yes Yamil Osorio DO   ciclopirox (LOPROX) 0.77 % cream APPLY CREAM TOPICALLY TWICE DAILY FOR 14 DAYS AS NEEDED FOR OUTBREAK AS DIRECTED 22  Yes Historical Provider, MD   clotrimazole-betamethasone (LOTRISONE) 1-0.05 % cream Apply topically 2 times daily.  22  Yes DONATO Lew III   fluticasone [facility]) 50 MCG/ACT nasal spray 2 sprays by Each Nostril route daily 22  Yes Hawa Wheeler, APRN - CNP   pantoprazole (PROTONIX) 20 MG tablet TAKE 1 TABLET BY MOUTH ONCE DAILY 9/15/21  Yes Historical Provider, MD   albuterol sulfate  (90 Base) MCG/ACT inhaler Inhale 2 puffs into the lungs every 6 hours as needed for Wheezing   Yes Historical Provider, MD   montelukast (SINGULAIR) 10 MG tablet Take 10 mg by mouth nightly. Yes Historical Provider, MD   albuterol (PROVENTIL) (2.5 MG/3ML) 0.083% nebulizer solution Take 2.5 mg by nebulization every 6 hours as needed. Yes Historical Provider, MD          HPI: Evaluated hypertension diabetes reflux disease. He is essentially new to me but not to the practice. Currently with some cough and congestion recently started on prednisone and Biaxin with some relief but was evaluated by Dr. Dilan Roper today who recommended possibly starting cefdinir with his current respiratory symptoms. I am in agreement prescription provided. He has tolerated Keflex well in the past so even though penicillin allergy cephalosporins have been tolerated. Medication initiated along with the Biaxin and will reassess with next visit. Blood pressures currently controlled. Medication as prescribed. Lab studies to be completed and then will review when he returns. Skin lesion groin area following with Dr. Tessie Cheadle of Systems   Constitutional: Negative. HENT:  Positive for congestion. Eyes: Negative. Respiratory:  Positive for cough. Gastrointestinal: Negative. Endocrine: Negative. Genitourinary: Negative. Musculoskeletal: Negative. Skin: Negative. Allergic/Immunologic: Negative. Neurological: Negative. Hematological: Negative. Psychiatric/Behavioral: Negative. systems review overall stable aside from respiratory symptoms as noted.       Current Outpatient Medications:     brompheniramine-pseudoephedrine-DM 2-30-10 MG/5ML syrup, TAKE 10ML BY MOUTH EVERY 4 TO 6 HOURS AS NEEDED, Disp: , Rfl:     cefdinir (OMNICEF) 300 MG capsule, Take 1 capsule by mouth 2 times daily for 10 days, Disp: 20 capsule, Rfl: 0    clarithromycin (BIAXIN) 500 MG tablet, Take 1 tablet by mouth 2 times daily for 10 days, Disp: 20 tablet, Rfl: 0    predniSONE (DELTASONE) 10 MG tablet, 3 tabs once daily for 3 days, 2 tabs once daily for 3 days, 1 tab once daily for 3 days, Disp: 18 tablet, Rfl: 0    albuterol sulfate HFA (PROVENTIL;VENTOLIN;PROAIR) 108 (90 Base) MCG/ACT inhaler, Inhale 2 puffs into the lungs 4 times daily as needed for Wheezing, Disp: 18 g, Rfl: 0    pioglitazone (ACTOS) 30 MG tablet, Take 1/2 (one-half) tablet by mouth once daily, Disp: 45 tablet, Rfl: 0    lisinopril (PRINIVIL;ZESTRIL) 40 MG tablet, Take 1/2 (one-half) tablet by mouth twice daily, Disp: 90 tablet, Rfl: 0    ciclopirox (LOPROX) 0.77 % cream, APPLY CREAM TOPICALLY TWICE DAILY FOR 14 DAYS AS NEEDED FOR OUTBREAK AS DIRECTED, Disp: , Rfl:     clotrimazole-betamethasone (LOTRISONE) 1-0.05 % cream, Apply topically 2 times daily. , Disp: 45 g, Rfl: 0    fluticasone (FLONASE) 50 MCG/ACT nasal spray, 2 sprays by Each Nostril route daily, Disp: 48 g, Rfl: 1    pantoprazole (PROTONIX) 20 MG tablet, TAKE 1 TABLET BY MOUTH ONCE DAILY, Disp: , Rfl:     albuterol sulfate  (90 Base) MCG/ACT inhaler, Inhale 2 puffs into the lungs every 6 hours as needed for Wheezing, Disp: , Rfl:     montelukast (SINGULAIR) 10 MG tablet, Take 10 mg by mouth nightly., Disp: , Rfl:     albuterol (PROVENTIL) (2.5 MG/3ML) 0.083% nebulizer solution, Take 2.5 mg by nebulization every 6 hours as needed.   , Disp: , Rfl:     Allergies   Allergen Reactions    Ciprofloxacin     Levofloxacin Other (See Comments)     pain    Penicillins Hives       Social History     Tobacco Use    Smoking status: Former     Packs/day: 2.00     Years: 17.00     Pack years: 34.00     Types: Cigarettes     Start date: 18     Quit date: 1999     Years since quittin.0    Smokeless tobacco: Never   Vaping Use    Vaping Use: Never used   Substance Use Topics    Alcohol use: No    Drug use: Not Currently      Past Surgical History:   Procedure Laterality Date    APPENDECTOMY      COLONOSCOPY N/A 4/15/2021    COLONOSCOPY WITH BIOPSY performed by Tarsha Cali DO at [place] [address] TONSILLECTOMY      UPPER GASTROINTESTINAL ENDOSCOPY N/A 4/15/2021    EGD ESOPHAGOGASTRODUODENOSCOPY DILATATION performed by Ronnie Aguilera DO at [facility] ENDOSCOPY     Family History   Problem Relation Age of Onset    Cancer Mother     Diabetes Mother      Past Medical History:   Diagnosis Date    Asthma     Diabetes mellitus ([address]     Hypertension     Prolonged emergence from general anesthesia        Vitals:    12/19/22 1543   BP: 136/84   Pulse: 98   Temp: 98.4 °F (36.9 °C)   SpO2: 97%   Weight: (!) 422 lb (191.4 kg)   Height: 6' 1\" (1.854 m)     BP Readings from Last 3 Encounters:   12/19/22 136/84   12/12/22 (!) 142/80   12/06/22 126/76        Physical Exam  Vitals and nursing note reviewed. Constitutional:       Appearance: He is well-developed. HENT:      Head: Normocephalic. Right Ear: External ear normal.      Left Ear: External ear normal.      Nose: Nose normal.   Eyes:      Conjunctiva/sclera: Conjunctivae normal.      Pupils: Pupils are equal, round, and reactive to light. Cardiovascular:      Rate and Rhythm: Normal rate. Pulmonary:      Breath sounds: Normal breath sounds. Abdominal:      General: Bowel sounds are normal.      Palpations: Abdomen is soft. Musculoskeletal:         General: Normal range of motion. Cervical back: Normal range of motion and neck supple. Skin:     General: Skin is warm and dry. Neurological:      Mental Status: He is alert and oriented to person, place, and time. Psychiatric:         Behavior: Behavior normal.   Today's vitals physical exam stable. I will sit tight with current meds and care. I have recommended a follow-up visit in 3 weeks with lab studies prior. Medications as prescribed and will review and make appropriate recommendations with next visit. Plan Per Assessment:  Christian Spine was seen today for new patient and cough.     Diagnoses and all orders for this visit:    Secondary hypertension  -     CBC with Auto Differential; Future  - Comprehensive Metabolic Panel; Future  -     Hemoglobin A1C; Future  -     Lipid Panel; Future  -     T4; Future  -     TSH; Future    Type 2 diabetes mellitus without complication, without long-term current use of insulin (HCC)  -     CBC with Auto Differential; Future  -     Comprehensive Metabolic Panel; Future  -     Hemoglobin A1C; Future  -     Lipid Panel; Future  -     T4; Future  -     TSH; Future    Gastroesophageal reflux disease, unspecified whether esophagitis present  -     CBC with Auto Differential; Future  -     Comprehensive Metabolic Panel; Future  -     Hemoglobin A1C; Future  -     Lipid Panel; Future  -     T4; Future  -     TSH; Future    Other orders  -     cefdinir (OMNICEF) 300 MG capsule; Take 1 capsule by mouth 2 times daily for 10 days          Return in about 3 weeks (around 1/9/2023). Bonny Bryan,     Note was generated with the assistance of voice recognition software. Document was reviewed however may contain grammatical errors.

## 2022-12-20 ENCOUNTER — OFFICE VISIT (OUTPATIENT)
Dept: PODIATRY | Age: 57
End: 2022-12-20
Payer: COMMERCIAL

## 2022-12-20 VITALS — WEIGHT: 315 LBS | BODY MASS INDEX: 41.75 KG/M2 | HEIGHT: 73 IN

## 2022-12-20 DIAGNOSIS — M72.2 PLANTAR FASCIAL FIBROMATOSIS: ICD-10-CM

## 2022-12-20 DIAGNOSIS — S90.852D FOREIGN BODY IN LEFT FOOT, SUBSEQUENT ENCOUNTER: ICD-10-CM

## 2022-12-20 DIAGNOSIS — M79.672 LEFT FOOT PAIN: Primary | ICD-10-CM

## 2022-12-20 DIAGNOSIS — E11.9 TYPE 2 DIABETES MELLITUS WITHOUT COMPLICATION, UNSPECIFIED WHETHER LONG TERM INSULIN USE (HCC): ICD-10-CM

## 2022-12-20 PROCEDURE — 3017F COLORECTAL CA SCREEN DOC REV: CPT | Performed by: PODIATRIST

## 2022-12-20 PROCEDURE — 2022F DILAT RTA XM EVC RTNOPTHY: CPT | Performed by: PODIATRIST

## 2022-12-20 PROCEDURE — G8484 FLU IMMUNIZE NO ADMIN: HCPCS | Performed by: PODIATRIST

## 2022-12-20 PROCEDURE — G8417 CALC BMI ABV UP PARAM F/U: HCPCS | Performed by: PODIATRIST

## 2022-12-20 PROCEDURE — G8427 DOCREV CUR MEDS BY ELIG CLIN: HCPCS | Performed by: PODIATRIST

## 2022-12-20 PROCEDURE — 99214 OFFICE O/P EST MOD 30 MIN: CPT | Performed by: PODIATRIST

## 2022-12-20 PROCEDURE — 3044F HG A1C LEVEL LT 7.0%: CPT | Performed by: PODIATRIST

## 2022-12-20 PROCEDURE — 1036F TOBACCO NON-USER: CPT | Performed by: PODIATRIST

## 2022-12-20 RX ORDER — PREDNISONE 10 MG/1
10 TABLET ORAL DAILY
Qty: 7 TABLET | Refills: 0 | Status: SHIPPED | OUTPATIENT
Start: 2022-12-20 | End: 2022-12-27

## 2022-12-20 NOTE — PROGRESS NOTES
22  Nish Cano : 1965 Sex: male  Age: 62 y.o. Patient was referred by Ellie Montanez DO    CC:    Left heel pain    HPI:   Presents today left heel pain. Symptoms present several months. No recent injury or trauma. Denies any pain on the outside left foot. Denies stepping on anything he is aware of but does state he has stepped on many different things over the course of the last several years. Does have history of diabetes. States tenderness on the bottom left heel worse when he is wearing barefoot. Does have some relief when he is wearing a croc slipper shoe. Denies any current custom over-the-counter orthotics. Has responded well to prednisone in the past.  Radiographs left foot today. No additional pedal complaints at this time.     ROS:  Const: Denies constitutional symptoms  Musculo: Denies symptoms other than stated above  Skin: Denies symptoms other than stated above       Current Outpatient Medications:     predniSONE (DELTASONE) 10 MG tablet, Take 1 tablet by mouth daily for 7 days, Disp: 7 tablet, Rfl: 0    brompheniramine-pseudoephedrine-DM 2-30-10 MG/5ML syrup, TAKE 10ML BY MOUTH EVERY 4 TO 6 HOURS AS NEEDED, Disp: , Rfl:     cefdinir (OMNICEF) 300 MG capsule, Take 1 capsule by mouth 2 times daily for 10 days, Disp: 20 capsule, Rfl: 0    clarithromycin (BIAXIN) 500 MG tablet, Take 1 tablet by mouth 2 times daily for 10 days, Disp: 20 tablet, Rfl: 0    predniSONE (DELTASONE) 10 MG tablet, 3 tabs once daily for 3 days, 2 tabs once daily for 3 days, 1 tab once daily for 3 days, Disp: 18 tablet, Rfl: 0    albuterol sulfate HFA (PROVENTIL;VENTOLIN;PROAIR) 108 (90 Base) MCG/ACT inhaler, Inhale 2 puffs into the lungs 4 times daily as needed for Wheezing, Disp: 18 g, Rfl: 0    pioglitazone (ACTOS) 30 MG tablet, Take 1/2 (one-half) tablet by mouth once daily, Disp: 45 tablet, Rfl: 0    lisinopril (PRINIVIL;ZESTRIL) 40 MG tablet, Take 1/2 (one-half) tablet by mouth twice daily, Disp: 90 tablet, Rfl: 0    ciclopirox (LOPROX) 0.77 % cream, APPLY CREAM TOPICALLY TWICE DAILY FOR 14 DAYS AS NEEDED FOR OUTBREAK AS DIRECTED, Disp: , Rfl:     clotrimazole-betamethasone (LOTRISONE) 1-0.05 % cream, Apply topically 2 times daily. , Disp: 45 g, Rfl: 0    fluticasone (FLONASE) 50 MCG/ACT nasal spray, 2 sprays by Each Nostril route daily, Disp: 48 g, Rfl: 1    pantoprazole (PROTONIX) 20 MG tablet, TAKE 1 TABLET BY MOUTH ONCE DAILY, Disp: , Rfl:     albuterol sulfate  (90 Base) MCG/ACT inhaler, Inhale 2 puffs into the lungs every 6 hours as needed for Wheezing, Disp: , Rfl:     montelukast (SINGULAIR) 10 MG tablet, Take 10 mg by mouth nightly., Disp: , Rfl:     albuterol (PROVENTIL) (2.5 MG/3ML) 0.083% nebulizer solution, Take 2.5 mg by nebulization every 6 hours as needed. , Disp: , Rfl:   Allergies   Allergen Reactions    Ciprofloxacin     Levofloxacin Other (See Comments)     pain    Penicillins Hives       Past Medical History:   Diagnosis Date    Asthma     Diabetes mellitus [address] [address]     Hypertension     Prolonged emergence from general anesthesia            Vitals:    12/20/22 1006   Weight: (!) 422 lb (191.4 kg)   Height: 6' 1\" (1.854 m)       Work History/Social History: Foot and ankle history:     Focused Lower Extremity Physical Exam:    Neurovascular examination:    Dorsalis Pedis palpable bilateral.  Posterior tibialis palpable bilateral.    Capillary Refill Time:  Immediate return  Hair growth:  Symmetrical and bilateral   Skin:  Not atrophic  Edema: No edema bilateral feet or ankles. Neurologic:  Light touch intact bilateral.      Musculoskeletal/ Orthopedic examination:    Equinis: Absent bilateral  Dorsiflexion, plantarflexion, inversion, eversion bilateral 5 out of 5 muscle strength  Wiggling toes  Negative Homans  No pain to palpation fifth metatarsal base left foot.   There is tenderness medial band left plantar fascial.  Negative calcaneal squeeze test.  Negative Melani Lopez. Dermatology examination:    No open skin lesions or abrasions bilateral lower extremity. Dirt debris plantar feet. No open or draining wounds. Assessment and Plan:  Luly Houston was seen today for foot pain. Diagnoses and all orders for this visit:    Left foot pain  -     XR FOOT LEFT (MIN 3 VIEWS); Future    Type 2 diabetes mellitus without complication, unspecified whether long term insulin use (HCC)    Plantar fascial fibromatosis    Foreign body in left foot, subsequent encounter    Other orders  -     predniSONE (DELTASONE) 10 MG tablet; Take 1 tablet by mouth daily for 7 days      Current seen today left plantar fasciitis  New radiographs reviewed today. Does have incidental foreign body plantar lateral left foot which I did see multiple debris on the bottom of his entire foot. Did appear to be superficial after cleaning this. No open or draining wounds. If any new wounds come in sooner. Clinically presents plantar fasciitis left    Plantar fasciitis is a condition involving inflammation and pain to the heel. I did recommend passive and active range of motion stretches and strengthening of both the Achilles tendon and plantar fascia  Ice 10 minutes each night on the bottom of the plantar fascia  Avoid barefoot  Continue supportive walking shoe with well supported insert  I did recommend Powerstep Original full-length over-the-counter orthotics. I did apply U-shaped padding on the bottom of his current shoes to be dispensed  Prednisone 10 mg once daily for 1 week then. Importance of checking his glucose daily. Importance of avoiding barefoot. We did discuss cortisone injection plantar fascia at follow-up if continued symptoms. Did also recommend power step original orthotics. Return in about 3 weeks (around 1/10/2023). Seen By:  Abeba Flynn DPM      Document was created using voice recognition software. Note was reviewed, however may contain grammatical errors.

## 2022-12-20 NOTE — PROGRESS NOTES
Patient in office with c/o left heel pain. Denies any injury. Unable to wear regular shoes. Sx x several months. Xrays obtained prior to apt.

## 2023-01-04 DIAGNOSIS — I15.9 SECONDARY HYPERTENSION: ICD-10-CM

## 2023-01-04 DIAGNOSIS — K21.9 GASTROESOPHAGEAL REFLUX DISEASE, UNSPECIFIED WHETHER ESOPHAGITIS PRESENT: ICD-10-CM

## 2023-01-04 DIAGNOSIS — E11.9 TYPE 2 DIABETES MELLITUS WITHOUT COMPLICATION, WITHOUT LONG-TERM CURRENT USE OF INSULIN (HCC): ICD-10-CM

## 2023-01-04 LAB
ALBUMIN SERPL-MCNC: 4.2 G/DL (ref 3.5–5.2)
ALP BLD-CCNC: 81 U/L (ref 40–129)
ALT SERPL-CCNC: 31 U/L (ref 0–40)
ANION GAP SERPL CALCULATED.3IONS-SCNC: 14 MMOL/L (ref 7–16)
AST SERPL-CCNC: 30 U/L (ref 0–39)
BASOPHILS ABSOLUTE: 0.06 E9/L (ref 0–0.2)
BASOPHILS RELATIVE PERCENT: 0.9 % (ref 0–2)
BILIRUB SERPL-MCNC: 0.4 MG/DL (ref 0–1.2)
BUN BLDV-MCNC: 11 MG/DL (ref 6–20)
CALCIUM SERPL-MCNC: 9.8 MG/DL (ref 8.6–10.2)
CHLORIDE BLD-SCNC: 100 MMOL/L (ref 98–107)
CHOLESTEROL, TOTAL: 182 MG/DL (ref 0–199)
CO2: 23 MMOL/L (ref 22–29)
CREAT SERPL-MCNC: 0.8 MG/DL (ref 0.7–1.2)
EOSINOPHILS ABSOLUTE: 0.28 E9/L (ref 0.05–0.5)
EOSINOPHILS RELATIVE PERCENT: 4.1 % (ref 0–6)
GFR SERPL CREATININE-BSD FRML MDRD: >60 ML/MIN/1.73
GLUCOSE BLD-MCNC: 114 MG/DL (ref 74–99)
HBA1C MFR BLD: 6.5 % (ref 4–5.6)
HCT VFR BLD CALC: 46.5 % (ref 37–54)
HDLC SERPL-MCNC: 33 MG/DL
HEMOGLOBIN: 15 G/DL (ref 12.5–16.5)
IMMATURE GRANULOCYTES #: 0.03 E9/L
IMMATURE GRANULOCYTES %: 0.4 % (ref 0–5)
LDL CHOLESTEROL CALCULATED: 124 MG/DL (ref 0–99)
LYMPHOCYTES ABSOLUTE: 1.2 E9/L (ref 1.5–4)
LYMPHOCYTES RELATIVE PERCENT: 17.8 % (ref 20–42)
MCH RBC QN AUTO: 27.4 PG (ref 26–35)
MCHC RBC AUTO-ENTMCNC: 32.3 % (ref 32–34.5)
MCV RBC AUTO: 85 FL (ref 80–99.9)
MONOCYTES ABSOLUTE: 0.51 E9/L (ref 0.1–0.95)
MONOCYTES RELATIVE PERCENT: 7.6 % (ref 2–12)
NEUTROPHILS ABSOLUTE: 4.67 E9/L (ref 1.8–7.3)
NEUTROPHILS RELATIVE PERCENT: 69.2 % (ref 43–80)
PDW BLD-RTO: 15.7 FL (ref 11.5–15)
PLATELET # BLD: 250 E9/L (ref 130–450)
PMV BLD AUTO: 9.6 FL (ref 7–12)
POTASSIUM SERPL-SCNC: 4.4 MMOL/L (ref 3.5–5)
RBC # BLD: 5.47 E12/L (ref 3.8–5.8)
SODIUM BLD-SCNC: 137 MMOL/L (ref 132–146)
T4 TOTAL: 7.2 MCG/DL (ref 4.5–11.7)
TOTAL PROTEIN: 7.4 G/DL (ref 6.4–8.3)
TRIGL SERPL-MCNC: 127 MG/DL (ref 0–149)
TSH SERPL DL<=0.05 MIU/L-ACNC: 1.43 UIU/ML (ref 0.27–4.2)
VLDLC SERPL CALC-MCNC: 25 MG/DL
WBC # BLD: 6.8 E9/L (ref 4.5–11.5)

## 2023-01-09 ENCOUNTER — OFFICE VISIT (OUTPATIENT)
Dept: PRIMARY CARE CLINIC | Age: 58
End: 2023-01-09
Payer: COMMERCIAL

## 2023-01-09 VITALS
TEMPERATURE: 97.3 F | HEART RATE: 96 BPM | DIASTOLIC BLOOD PRESSURE: 86 MMHG | HEIGHT: 73 IN | BODY MASS INDEX: 41.75 KG/M2 | SYSTOLIC BLOOD PRESSURE: 136 MMHG | OXYGEN SATURATION: 94 % | WEIGHT: 315 LBS

## 2023-01-09 DIAGNOSIS — E11.9 TYPE 2 DIABETES MELLITUS WITHOUT COMPLICATION, WITHOUT LONG-TERM CURRENT USE OF INSULIN (HCC): ICD-10-CM

## 2023-01-09 DIAGNOSIS — I15.9 SECONDARY HYPERTENSION: Primary | ICD-10-CM

## 2023-01-09 DIAGNOSIS — R05.1 ACUTE COUGH: ICD-10-CM

## 2023-01-09 DIAGNOSIS — K21.9 GASTROESOPHAGEAL REFLUX DISEASE, UNSPECIFIED WHETHER ESOPHAGITIS PRESENT: ICD-10-CM

## 2023-01-09 PROCEDURE — 99214 OFFICE O/P EST MOD 30 MIN: CPT | Performed by: FAMILY MEDICINE

## 2023-01-09 PROCEDURE — 2022F DILAT RTA XM EVC RTNOPTHY: CPT | Performed by: FAMILY MEDICINE

## 2023-01-09 PROCEDURE — 1036F TOBACCO NON-USER: CPT | Performed by: FAMILY MEDICINE

## 2023-01-09 PROCEDURE — G8417 CALC BMI ABV UP PARAM F/U: HCPCS | Performed by: FAMILY MEDICINE

## 2023-01-09 PROCEDURE — 3044F HG A1C LEVEL LT 7.0%: CPT | Performed by: FAMILY MEDICINE

## 2023-01-09 PROCEDURE — G8427 DOCREV CUR MEDS BY ELIG CLIN: HCPCS | Performed by: FAMILY MEDICINE

## 2023-01-09 PROCEDURE — 3017F COLORECTAL CA SCREEN DOC REV: CPT | Performed by: FAMILY MEDICINE

## 2023-01-09 PROCEDURE — G8484 FLU IMMUNIZE NO ADMIN: HCPCS | Performed by: FAMILY MEDICINE

## 2023-01-09 RX ORDER — FEXOFENADINE HCL 180 MG/1
180 TABLET ORAL DAILY
COMMUNITY

## 2023-01-09 ASSESSMENT — ENCOUNTER SYMPTOMS
GASTROINTESTINAL NEGATIVE: 1
EYES NEGATIVE: 1
ALLERGIC/IMMUNOLOGIC NEGATIVE: 1
RESPIRATORY NEGATIVE: 1

## 2023-01-09 NOTE — PROGRESS NOTES
23     Halie Barraza    : 1965 Sex: male   Age: 62 y.o. Chief Complaint   Patient presents with    Discuss Labs    Cough     Patient reports consistent cough since diagnosis with upper respiratory       Prior to Admission medications    Medication Sig Start Date End Date Taking? Authorizing Provider   fexofenadine (ALLEGRA) 180 MG tablet Take 180 mg by mouth daily   Yes Historical Provider, MD   albuterol sulfate HFA (PROVENTIL;VENTOLIN;PROAIR) 108 (90 Base) MCG/ACT inhaler Inhale 2 puffs into the lungs 4 times daily as needed for Wheezing 22  Yes Cricket Mcardle III, PA   pioglitazone (ACTOS) 30 MG tablet Take 1/2 (one-half) tablet by mouth once daily 22  Yes Yamil Osorio DO   lisinopril (PRINIVIL;ZESTRIL) 40 MG tablet Take 1/2 (one-half) tablet by mouth twice daily 22  Yes Yamil Osorio DO   clotrimazole-betamethasone (LOTRISONE) 1-0.05 % cream Apply topically 2 times daily. 22  Yes Cricket Mcardle III, PA   fluticasone Thurman Gang) 50 MCG/ACT nasal spray 2 sprays by Each Nostril route daily 22  Yes TIGIST Mcpherson CNP   pantoprazole (PROTONIX) 20 MG tablet TAKE 1 TABLET BY MOUTH ONCE DAILY 9/15/21  Yes Historical Provider, MD   albuterol sulfate  (90 Base) MCG/ACT inhaler Inhale 2 puffs into the lungs every 6 hours as needed for Wheezing   Yes Historical Provider, MD   montelukast (SINGULAIR) 10 MG tablet Take 10 mg by mouth nightly. Yes Historical Provider, MD   albuterol (PROVENTIL) (2.5 MG/3ML) 0.083% nebulizer solution Take 2.5 mg by nebulization every 6 hours as needed. Yes Historical Provider, MD          HPI: Patient seen today in follow-up on blood pressure reflux disease diabetes all of which has been very good control. Today some persistent cough and mucus production but no evidence of infection. Recommending some Mucinex DM as needed and then will follow-up if persistent.   Cautioned him that lisinopril needs to be considered if persistent chronic cough but for now we will maintain. Review of Systems   Constitutional: Negative. HENT: Negative. Eyes: Negative. Respiratory: Negative. Gastrointestinal: Negative. Endocrine: Negative. Genitourinary: Negative. Musculoskeletal: Negative. Skin: Negative. Allergic/Immunologic: Negative. Neurological: Negative. Hematological: Negative. Psychiatric/Behavioral: Negative. Today systems review stable meds as prescribed. Current Outpatient Medications:     fexofenadine (ALLEGRA) 180 MG tablet, Take 180 mg by mouth daily, Disp: , Rfl:     albuterol sulfate HFA (PROVENTIL;VENTOLIN;PROAIR) 108 (90 Base) MCG/ACT inhaler, Inhale 2 puffs into the lungs 4 times daily as needed for Wheezing, Disp: 18 g, Rfl: 0    pioglitazone (ACTOS) 30 MG tablet, Take 1/2 (one-half) tablet by mouth once daily, Disp: 45 tablet, Rfl: 0    lisinopril (PRINIVIL;ZESTRIL) 40 MG tablet, Take 1/2 (one-half) tablet by mouth twice daily, Disp: 90 tablet, Rfl: 0    clotrimazole-betamethasone (LOTRISONE) 1-0.05 % cream, Apply topically 2 times daily. , Disp: 45 g, Rfl: 0    fluticasone (FLONASE) 50 MCG/ACT nasal spray, 2 sprays by Each Nostril route daily, Disp: 48 g, Rfl: 1    pantoprazole (PROTONIX) 20 MG tablet, TAKE 1 TABLET BY MOUTH ONCE DAILY, Disp: , Rfl:     albuterol sulfate  (90 Base) MCG/ACT inhaler, Inhale 2 puffs into the lungs every 6 hours as needed for Wheezing, Disp: , Rfl:     montelukast (SINGULAIR) 10 MG tablet, Take 10 mg by mouth nightly., Disp: , Rfl:     albuterol (PROVENTIL) (2.5 MG/3ML) 0.083% nebulizer solution, Take 2.5 mg by nebulization every 6 hours as needed.   , Disp: , Rfl:     Allergies   Allergen Reactions    Ciprofloxacin     Levofloxacin Other (See Comments)     pain    Penicillins Hives       Social History     Tobacco Use    Smoking status: Former     Packs/day: 2.00     Years: 17.00     Pack years: 34.00     Types: Cigarettes Start date: 18     Quit date: 1999     Years since quittin.1    Smokeless tobacco: Never   Vaping Use    Vaping Use: Never used   Substance Use Topics    Alcohol use: No    Drug use: Not Currently      Past Surgical History:   Procedure Laterality Date    APPENDECTOMY      COLONOSCOPY N/A 4/15/2021    COLONOSCOPY WITH BIOPSY performed by Byron Miller DO at [facility] ENDOSCOPY    CYST REMOVAL      FOOT SURGERY      TONSILLECTOMY      UPPER GASTROINTESTINAL ENDOSCOPY N/A 4/15/2021    EGD ESOPHAGOGASTRODUODENOSCOPY DILATATION performed by Byron Miller DO at [facility] ENDOSCOPY     Family History   Problem Relation Age of Onset    Cancer Mother     Diabetes Mother      Past Medical History:   Diagnosis Date    Asthma     Diabetes mellitus ([facility] [address]     Hypertension     Prolonged emergence from general anesthesia        Vitals:    23 1052   BP: 136/86   Pulse: 96   Temp: 97.3 °F (36.3 °C)   SpO2: 94%   Weight: (!) 422 lb 12.8 oz (191.8 kg)   Height: 6' 1\" (1.854 m)     BP Readings from Last 3 Encounters:   23 136/86   22 136/84   22 (!) 142/80        Physical Exam  Vitals and nursing note reviewed. Constitutional:       Appearance: He is well-developed. HENT:      Head: Normocephalic. Right Ear: External ear normal.      Left Ear: External ear normal.      Nose: Nose normal.   Eyes:      Conjunctiva/sclera: Conjunctivae normal.      Pupils: Pupils are equal, round, and reactive to light. Cardiovascular:      Rate and Rhythm: Normal rate. Pulmonary:      Breath sounds: Normal breath sounds. Abdominal:      General: Bowel sounds are normal.      Palpations: Abdomen is soft. Musculoskeletal:         General: Normal range of motion. Cervical back: Normal range of motion and neck supple. Skin:     General: Skin is warm and dry. Neurological:      Mental Status: He is alert and oriented to person, place, and time.    Psychiatric:         Behavior: Behavior normal.   Blood pressure well controlled. Heart was regular lungs are clear. No evidence of infection as noted. Labs reviewed and stable. Follow-up visit with me 1 month and sooner if problems. Persistent chronic cough we would recommend a change from lisinopril to an alternative med. Lab Results   Component Value Date    TSH 1.430 01/04/2023    TSH 1.980 04/19/2019    K4XNTLI 7.2 01/04/2023     Lab Results   Component Value Date    CHOL 182 01/04/2023    CHOL 185 07/12/2022     Lab Results   Component Value Date    TRIG 127 01/04/2023    TRIG 159 (H) 07/12/2022     Lab Results   Component Value Date    HDL 33 01/04/2023    HDL 34 07/12/2022     No results found for: [facility]  Lab Results   Component Value Date    LABVLDL 25 01/04/2023    LABVLDL 32 07/12/2022     Lab Results   Component Value Date    CHOLHDLRATIO 5.7 (H) 10/02/2017     Lab Results   Component Value Date    WBC 6.8 01/04/2023    HGB 15.0 01/04/2023    HCT 46.5 01/04/2023    MCV 85.0 01/04/2023     01/04/2023    LYMPHOPCT 17.8 (L) 01/04/2023    RBC 5.47 01/04/2023    MCH 27.4 01/04/2023    MCHC 32.3 01/04/2023    RDW 15.7 (H) 01/04/2023     Lab Results   Component Value Date     01/04/2023    K 4.4 01/04/2023     01/04/2023    CO2 23 01/04/2023    BUN 11 01/04/2023    CREATININE 0.8 01/04/2023    GLUCOSE 114 (H) 01/04/2023    CALCIUM 9.8 01/04/2023    PROT 7.4 01/04/2023    LABALBU 4.2 01/04/2023    BILITOT 0.4 01/04/2023    ALKPHOS 81 01/04/2023    AST 30 01/04/2023    ALT 31 01/04/2023    LABGLOM >60 01/04/2023    GFRAA >60 05/02/2022        Lab Results   Component Value Date    PSA 2.02 11/03/2022    PSA 1.92 06/09/2022    PSA 1.84 10/22/2021      Lab Results   Component Value Date    LABA1C 6.5 (H) 01/04/2023    LABA1C 6.0 (H) 05/02/2022    LABA1C 5.9 10/22/2021     No results found for: EAG      Plan Per Assessment:  Aakash Vuong was seen today for discuss labs and cough.     Diagnoses and all orders for this visit:    Secondary hypertension    Acute cough    Gastroesophageal reflux disease, unspecified whether esophagitis present    Type 2 diabetes mellitus without complication, without long-term current use of insulin ([facility]          Return in about 1 month (around 2/9/2023). José Antonio Araiza DO    Note was generated with the assistance of voice recognition software. Document was reviewed however may contain grammatical errors.

## 2023-01-10 ENCOUNTER — OFFICE VISIT (OUTPATIENT)
Dept: PODIATRY | Age: 58
End: 2023-01-10
Payer: COMMERCIAL

## 2023-01-10 DIAGNOSIS — E11.9 TYPE 2 DIABETES MELLITUS WITHOUT COMPLICATION, UNSPECIFIED WHETHER LONG TERM INSULIN USE (HCC): ICD-10-CM

## 2023-01-10 DIAGNOSIS — G60.8 HEREDITARY SENSORY NEUROPATHY: ICD-10-CM

## 2023-01-10 DIAGNOSIS — M20.41 HAMMER TOES OF BOTH FEET: ICD-10-CM

## 2023-01-10 DIAGNOSIS — B35.1 TINEA UNGUIUM: ICD-10-CM

## 2023-01-10 DIAGNOSIS — M79.672 LEFT FOOT PAIN: Primary | ICD-10-CM

## 2023-01-10 DIAGNOSIS — M72.2 PLANTAR FASCIAL FIBROMATOSIS: ICD-10-CM

## 2023-01-10 DIAGNOSIS — M20.42 HAMMER TOES OF BOTH FEET: ICD-10-CM

## 2023-01-10 PROCEDURE — 99213 OFFICE O/P EST LOW 20 MIN: CPT | Performed by: PODIATRIST

## 2023-01-10 PROCEDURE — G8417 CALC BMI ABV UP PARAM F/U: HCPCS | Performed by: PODIATRIST

## 2023-01-10 PROCEDURE — 3017F COLORECTAL CA SCREEN DOC REV: CPT | Performed by: PODIATRIST

## 2023-01-10 PROCEDURE — 1036F TOBACCO NON-USER: CPT | Performed by: PODIATRIST

## 2023-01-10 PROCEDURE — G8427 DOCREV CUR MEDS BY ELIG CLIN: HCPCS | Performed by: PODIATRIST

## 2023-01-10 PROCEDURE — 2022F DILAT RTA XM EVC RTNOPTHY: CPT | Performed by: PODIATRIST

## 2023-01-10 PROCEDURE — G8484 FLU IMMUNIZE NO ADMIN: HCPCS | Performed by: PODIATRIST

## 2023-01-10 PROCEDURE — 3044F HG A1C LEVEL LT 7.0%: CPT | Performed by: PODIATRIST

## 2023-01-10 NOTE — PROGRESS NOTES
Patient in office to follow up with left heel pain. Continues to have pain. Did purchase OTC inserts. Sabino Ornelas DO last seen 01/09/2022. CC:    Follow-up left heel pain  Follow-up diabetic exam    HPI:   Presents today for left heel pain follow-up. Does have some tenderness to left heel but has been using over-the-counter inserts with some improvement. No calf pain. Denies any recent injury. No current custom diabetic inserts or diabetic shoes. ROS:  Const: Denies constitutional symptoms  Musculo: Denies symptoms other than stated above  Skin: Denies symptoms other than stated above       Current Outpatient Medications:     fexofenadine (ALLEGRA) 180 MG tablet, Take 180 mg by mouth daily, Disp: , Rfl:     albuterol sulfate HFA (PROVENTIL;VENTOLIN;PROAIR) 108 (90 Base) MCG/ACT inhaler, Inhale 2 puffs into the lungs 4 times daily as needed for Wheezing, Disp: 18 g, Rfl: 0    pioglitazone (ACTOS) 30 MG tablet, Take 1/2 (one-half) tablet by mouth once daily, Disp: 45 tablet, Rfl: 0    lisinopril (PRINIVIL;ZESTRIL) 40 MG tablet, Take 1/2 (one-half) tablet by mouth twice daily, Disp: 90 tablet, Rfl: 0    clotrimazole-betamethasone (LOTRISONE) 1-0.05 % cream, Apply topically 2 times daily. , Disp: 45 g, Rfl: 0    fluticasone (FLONASE) 50 MCG/ACT nasal spray, 2 sprays by Each Nostril route daily, Disp: 48 g, Rfl: 1    pantoprazole (PROTONIX) 20 MG tablet, TAKE 1 TABLET BY MOUTH ONCE DAILY, Disp: , Rfl:     albuterol sulfate  (90 Base) MCG/ACT inhaler, Inhale 2 puffs into the lungs every 6 hours as needed for Wheezing, Disp: , Rfl:     montelukast (SINGULAIR) 10 MG tablet, Take 10 mg by mouth nightly., Disp: , Rfl:     albuterol (PROVENTIL) (2.5 MG/3ML) 0.083% nebulizer solution, Take 2.5 mg by nebulization every 6 hours as needed.   , Disp: , Rfl:   Allergies   Allergen Reactions    Ciprofloxacin     Levofloxacin Other (See Comments)     pain    Penicillins Hives       Past Medical History: Diagnosis Date    Asthma     Diabetes mellitus ([facility] [address]     Hypertension     Prolonged emergence from general anesthesia            There were no vitals filed for this visit. Work History/Social History: Foot and ankle history:     Focused Lower Extremity Physical Exam:    Neurovascular examination:    Dorsalis Pedis palpable bilateral.  Posterior tibialis palpable bilateral.    Capillary Refill Time:  Immediate return  Hair growth:  Symmetrical and bilateral   Skin:  Not atrophic  Edema: Mild edema bilateral feet or ankles. Neurologic:  Light touch diminished bilateral.      Musculoskeletal/ Orthopedic examination:    Equinis: Absent bilateral  Dorsiflexion, plantarflexion, inversion, eversion bilateral 5 out of 5 muscle strength  Wiggling toes  Negative Homans  Mild tenderness plantar fascial bilateral worse on left. Negative calcaneal squeeze test.  Flexible hammertoes 2 through 5 bilateral    Dermatology examination:    No open skin lesions or abrasions. Dry skin plantar heels. Assessment and Plan:  Candace Anderson was seen today for follow-up and foot pain. Diagnoses and all orders for this visit:    Left foot pain    Type 2 diabetes mellitus without complication, unspecified whether long term insulin use (HCC)    Plantar fascial fibromatosis    Hammer toes of both feet    Tinea unguium      Follow-up diabetic exam    No acute osseous abnormality. Dorsal and plantar calcaneal spurs. I did write an order for [employer] for diabetic shoes and inserts. Avoid barefoot. Clinically still presents plantar fasciitis with improvement. Once avoiding barefoot even at the house. We did discuss cortisone injection but at this time does have a history of diabetes I would recommend diabetic inserts and diabetic shoes prior to cortisone injection. We will follow-up in office 3 months for diabetic exam.          Return in about 3 months (around 4/10/2023).       Seen By:  Gaston Warner DPM      Document was created using voice recognition software. Note was reviewed, however may contain grammatical errors.

## 2023-02-01 ENCOUNTER — OFFICE VISIT (OUTPATIENT)
Dept: FAMILY MEDICINE CLINIC | Age: 58
End: 2023-02-01
Payer: COMMERCIAL

## 2023-02-01 VITALS
BODY MASS INDEX: 41.75 KG/M2 | DIASTOLIC BLOOD PRESSURE: 78 MMHG | TEMPERATURE: 98.6 F | OXYGEN SATURATION: 98 % | HEART RATE: 86 BPM | SYSTOLIC BLOOD PRESSURE: 136 MMHG | HEIGHT: 73 IN | WEIGHT: 315 LBS

## 2023-02-01 DIAGNOSIS — J01.40 ACUTE NON-RECURRENT PANSINUSITIS: Primary | ICD-10-CM

## 2023-02-01 PROCEDURE — 3017F COLORECTAL CA SCREEN DOC REV: CPT

## 2023-02-01 PROCEDURE — 1036F TOBACCO NON-USER: CPT

## 2023-02-01 PROCEDURE — G8484 FLU IMMUNIZE NO ADMIN: HCPCS

## 2023-02-01 PROCEDURE — G8417 CALC BMI ABV UP PARAM F/U: HCPCS

## 2023-02-01 PROCEDURE — G8427 DOCREV CUR MEDS BY ELIG CLIN: HCPCS

## 2023-02-01 PROCEDURE — 99213 OFFICE O/P EST LOW 20 MIN: CPT

## 2023-02-01 RX ORDER — PREDNISONE 10 MG/1
TABLET ORAL
Qty: 9 TABLET | Refills: 0 | Status: SHIPPED | OUTPATIENT
Start: 2023-02-01 | End: 2023-02-10

## 2023-02-01 RX ORDER — CLINDAMYCIN HYDROCHLORIDE 300 MG/1
300 CAPSULE ORAL 2 TIMES DAILY
Qty: 20 CAPSULE | Refills: 0 | Status: SHIPPED | OUTPATIENT
Start: 2023-02-01 | End: 2023-02-11

## 2023-02-01 NOTE — PROGRESS NOTES
2023     Bello Argueta 62 y.o. male    : 1965   Chief Complaint:   Congestion (Thinks that it might be a sinus infection or \"allergy overload\". Did get a lot of sleep last night since kids were keeping him up. Having ear pain. Pt states that he just doesn't feel right.) and Otalgia      History of Present Illness   Source of history provided by:  patient. Bello Argueta is a 62 y.o. old male who presents to [address] [place] [address] for evaluation of congestion x several weeks. Associated symptoms include ear pain and sinus pressure. Since onset symptoms have been consistent. Patient has had no known Covid 19 exposure. Patient has not been diagnosed with COVID-19 in the last 90 days. Has taken nothing at home with some symptomatic relief. Denies any fever, chills, CP, dyspnea, LE edema, abdominal pain, nausea, vomiting, rash, dizziness, or lethargy. Denies any history of asthma, pneumonia, recurrent bronchitis or COPD. They have no history of tobacco abuse. ROS   Past Medical History:   Past Medical History:   Diagnosis Date    Asthma     Diabetes mellitus ([facility] [address]     Hypertension     Prolonged emergence from general anesthesia      Past Surgical History:  has a past surgical history that includes Tonsillectomy; Appendectomy; Foot surgery; cyst removal; Colonoscopy (N/A, 4/15/2021); and Upper gastrointestinal endoscopy (N/A, 4/15/2021). Social History:  reports that he quit smoking about 23 years ago. His smoking use included cigarettes. He started smoking about 41 years ago. He has a 34.00 pack-year smoking history. He has never used smokeless tobacco. He reports that he does not currently use drugs. He reports that he does not drink alcohol. Family History: family history includes Cancer in his mother; Diabetes in his mother.    Allergies: Ciprofloxacin, Levofloxacin, and Penicillins    Unless otherwise stated in this report the patient's positive and negative responses for review of systems for constitutional, eyes, ENT, cardiovascular, respiratory, gastrointestinal, neurological, , musculoskeletal, and integument systems and related systems to the presenting problem are either stated in the history of present illness or were not pertinent or were negative for the symptoms and/or complaints related to the presenting medical problem. Positives and pertinent negatives as per HPI. All others reviewed and are negative. Physical Exam   VS:    Vitals:    02/01/23 1550   BP: 136/78   Pulse: 86   Temp: 98.6 °F (37 °C)   SpO2: 98%   Weight: (!) 422 lb (191.4 kg)   Height: 6' 1\" (1.854 m)     Oxygen Saturation Interpretation: Normal.    Constitutional:  Alert, development consistent with age. NAD. Head:  NC/NT. Airway patent. Moderate TTP over maxillary and frontal sinuses. Ear: Bilateral external auditory ear canals are clear there is no cerumen, erythema or debris present. Bilateral tympanic membrane intact, translucent and normal cone of light. There is no serous effusion or drainage present. Nose: Bilateral turbinates are swollen. No septal deviation. Rhinorrhea present. Mouth: Posterior pharynx with mild erythema and clear postnasal drip. No tonsillar hypertrophy or exudate. Neck:  Normal ROM. Supple. No anterior cervical adenopathy noted. Lungs: CTAB without wheezes, rales, or rhonchi. CV:  Regular rate and rhythm, normal heart sounds, without pathological murmurs, ectopy, gallops, or rubs. GI: Bowel sounds active x4. Abdomen is soft nontender nondistended. There is no guarding or rebound tenderness noted. Skin:  Normal turgor. Warm, dry, without visible rash. Lymphatic: No lymphangitis or adenopathy noted. Neurological:  Oriented. Motor functions intact. Lab / Imaging Results   All laboratory and radiology results have been personally reviewed by myself. Labs:  No results found for this visit on 02/01/23. Imaging:   All Radiology results interpreted by Radiologist unless otherwise noted. No orders to display         Assessment / Plan   Marisa Deck was seen today for congestion and otalgia. Diagnoses and all orders for this visit:    Acute non-recurrent pansinusitis  -     clindamycin (CLEOCIN) 300 MG capsule; Take 1 capsule by mouth in the morning and at bedtime for 10 days  -     predniSONE (DELTASONE) 10 MG tablet; Take 0.5 tablets by mouth three times daily for 3 days, THEN 0.5 tablets 2 times daily for 3 days, THEN 0.5 tablets daily for 3 days. Disposition:  Disposition: Discharge to home    Script for Clindamycin and Prednisone prescribed, side effects discussed. Follow-up with PCP in 7 to 10 days. Red flag symptoms were discussed with the patient including high or refractory fever, progressive SOB, dyspnea, CP, calf pain/swelling, shaking chills, vomiting, abdominal pain, lethargy, flank pain, or decreased urinary output. If any of these occur, they should go immediately to the emergency department for further evaluation. All questions were answered. The patient relies understanding and was agreeable to the above plan. Balaji Villaseñor, TIGIST - CNP    *NOTE: This report was transcribed using voice recognition software. Every effort was made to ensure accuracy; however, inadvertent computerized transcription errors may be present.

## 2023-02-08 LAB
ALBUMIN SERPL-MCNC: 4.2 G/DL (ref 3.5–5.2)
ALP BLD-CCNC: 84 U/L (ref 40–129)
ALT SERPL-CCNC: 21 U/L (ref 0–40)
AST SERPL-CCNC: 19 U/L (ref 0–39)
BILIRUB SERPL-MCNC: 0.5 MG/DL (ref 0–1.2)
BILIRUBIN DIRECT: <0.2 MG/DL (ref 0–0.3)
BILIRUBIN, INDIRECT: NORMAL MG/DL (ref 0–1)
HCT VFR BLD CALC: 45.5 % (ref 37–54)
PROSTATE SPECIFIC ANTIGEN: 2.16 NG/ML (ref 0–4)
TESTOSTERONE TOTAL: 185.4 NG/DL
TOTAL PROTEIN: 7.3 G/DL (ref 6.4–8.3)

## 2023-02-13 ENCOUNTER — OFFICE VISIT (OUTPATIENT)
Dept: PRIMARY CARE CLINIC | Age: 58
End: 2023-02-13
Payer: COMMERCIAL

## 2023-02-13 VITALS
DIASTOLIC BLOOD PRESSURE: 84 MMHG | SYSTOLIC BLOOD PRESSURE: 136 MMHG | BODY MASS INDEX: 41.75 KG/M2 | HEART RATE: 78 BPM | OXYGEN SATURATION: 96 % | HEIGHT: 73 IN | WEIGHT: 315 LBS | TEMPERATURE: 98 F

## 2023-02-13 DIAGNOSIS — K21.9 GASTROESOPHAGEAL REFLUX DISEASE, UNSPECIFIED WHETHER ESOPHAGITIS PRESENT: Primary | ICD-10-CM

## 2023-02-13 DIAGNOSIS — E11.9 TYPE 2 DIABETES MELLITUS WITHOUT COMPLICATION, WITHOUT LONG-TERM CURRENT USE OF INSULIN (HCC): ICD-10-CM

## 2023-02-13 DIAGNOSIS — I15.9 SECONDARY HYPERTENSION: ICD-10-CM

## 2023-02-13 PROCEDURE — 3017F COLORECTAL CA SCREEN DOC REV: CPT | Performed by: FAMILY MEDICINE

## 2023-02-13 PROCEDURE — 3044F HG A1C LEVEL LT 7.0%: CPT | Performed by: FAMILY MEDICINE

## 2023-02-13 PROCEDURE — G8484 FLU IMMUNIZE NO ADMIN: HCPCS | Performed by: FAMILY MEDICINE

## 2023-02-13 PROCEDURE — 2022F DILAT RTA XM EVC RTNOPTHY: CPT | Performed by: FAMILY MEDICINE

## 2023-02-13 PROCEDURE — G8427 DOCREV CUR MEDS BY ELIG CLIN: HCPCS | Performed by: FAMILY MEDICINE

## 2023-02-13 PROCEDURE — 99214 OFFICE O/P EST MOD 30 MIN: CPT | Performed by: FAMILY MEDICINE

## 2023-02-13 PROCEDURE — 1036F TOBACCO NON-USER: CPT | Performed by: FAMILY MEDICINE

## 2023-02-13 PROCEDURE — G8417 CALC BMI ABV UP PARAM F/U: HCPCS | Performed by: FAMILY MEDICINE

## 2023-02-13 RX ORDER — PIOGLITAZONEHYDROCHLORIDE 30 MG/1
TABLET ORAL
Qty: 45 TABLET | Refills: 0 | Status: SHIPPED | OUTPATIENT
Start: 2023-02-13

## 2023-02-13 RX ORDER — LISINOPRIL 40 MG/1
TABLET ORAL
Qty: 90 TABLET | Refills: 0 | Status: SHIPPED | OUTPATIENT
Start: 2023-02-13

## 2023-02-13 SDOH — ECONOMIC STABILITY: HOUSING INSECURITY
IN THE LAST 12 MONTHS, WAS THERE A TIME WHEN YOU DID NOT HAVE A STEADY PLACE TO SLEEP OR SLEPT IN A SHELTER (INCLUDING NOW)?: NO

## 2023-02-13 SDOH — ECONOMIC STABILITY: INCOME INSECURITY: HOW HARD IS IT FOR YOU TO PAY FOR THE VERY BASICS LIKE FOOD, HOUSING, MEDICAL CARE, AND HEATING?: NOT HARD AT ALL

## 2023-02-13 SDOH — ECONOMIC STABILITY: FOOD INSECURITY: WITHIN THE PAST 12 MONTHS, YOU WORRIED THAT YOUR FOOD WOULD RUN OUT BEFORE YOU GOT MONEY TO BUY MORE.: NEVER TRUE

## 2023-02-13 SDOH — ECONOMIC STABILITY: FOOD INSECURITY: WITHIN THE PAST 12 MONTHS, THE FOOD YOU BOUGHT JUST DIDN'T LAST AND YOU DIDN'T HAVE MONEY TO GET MORE.: NEVER TRUE

## 2023-02-13 ASSESSMENT — PATIENT HEALTH QUESTIONNAIRE - PHQ9
1. LITTLE INTEREST OR PLEASURE IN DOING THINGS: 0
2. FEELING DOWN, DEPRESSED OR HOPELESS: 0
SUM OF ALL RESPONSES TO PHQ9 QUESTIONS 1 & 2: 0
SUM OF ALL RESPONSES TO PHQ QUESTIONS 1-9: 0

## 2023-02-13 ASSESSMENT — ENCOUNTER SYMPTOMS
EYES NEGATIVE: 1
RESPIRATORY NEGATIVE: 1
GASTROINTESTINAL NEGATIVE: 1
ALLERGIC/IMMUNOLOGIC NEGATIVE: 1

## 2023-02-13 NOTE — PROGRESS NOTES
23     Lissette Otoole    : 1965 Sex: male   Age: 62 y.o. Chief Complaint   Patient presents with    Hypertension     Pt here for check up and refills. Prior to Admission medications    Medication Sig Start Date End Date Taking? Authorizing Provider   lisinopril (PRINIVIL;ZESTRIL) 40 MG tablet Take 1/2 (one-half) tablet by mouth twice daily 23  Yes Colten Zapata, DO   pioglitazone (ACTOS) 30 MG tablet Take 1/2 (one-half) tablet by mouth once daily 23  Yes Colten Telma Traikoff, DO   fexofenadine (ALLEGRA) 180 MG tablet Take 180 mg by mouth daily   Yes Historical Provider, MD   albuterol sulfate HFA (PROVENTIL;VENTOLIN;PROAIR) 108 (90 Base) MCG/ACT inhaler Inhale 2 puffs into the lungs 4 times daily as needed for Wheezing 22  Yes Olga Lung III, PA   clotrimazole-betamethasone (LOTRISONE) 1-0.05 % cream Apply topically 2 times daily. 22  Yes Olga Lung III, PA   fluticasone Frosty Cuna) 50 MCG/ACT nasal spray 2 sprays by Each Nostril route daily 22  Yes TIGIST Cruz CNP   pantoprazole (PROTONIX) 20 MG tablet TAKE 1 TABLET BY MOUTH ONCE DAILY 9/15/21  Yes Historical Provider, MD   albuterol sulfate  (90 Base) MCG/ACT inhaler Inhale 2 puffs into the lungs every 6 hours as needed for Wheezing   Yes Historical Provider, MD   montelukast (SINGULAIR) 10 MG tablet Take 10 mg by mouth nightly. Yes Historical Provider, MD   albuterol (PROVENTIL) (2.5 MG/3ML) 0.083% nebulizer solution Take 2.5 mg by nebulization every 6 hours as needed. Yes Historical Provider, MD          HPI: Seen today with hypertension diabetes reflux disease all of which is stable. Medications reviewed well-tolerated as prescribed. Systems review is overall stable. Review of Systems   Constitutional: Negative. HENT: Negative. Eyes: Negative. Respiratory: Negative. Gastrointestinal: Negative. Endocrine: Negative. Genitourinary: Negative. Musculoskeletal: Negative. Skin: Negative. Allergic/Immunologic: Negative. Neurological: Negative. Hematological: Negative. Psychiatric/Behavioral: Negative. Current Outpatient Medications:     lisinopril (PRINIVIL;ZESTRIL) 40 MG tablet, Take 1/2 (one-half) tablet by mouth twice daily, Disp: 90 tablet, Rfl: 0    pioglitazone (ACTOS) 30 MG tablet, Take 1/2 (one-half) tablet by mouth once daily, Disp: 45 tablet, Rfl: 0    fexofenadine (ALLEGRA) 180 MG tablet, Take 180 mg by mouth daily, Disp: , Rfl:     albuterol sulfate HFA (PROVENTIL;VENTOLIN;PROAIR) 108 (90 Base) MCG/ACT inhaler, Inhale 2 puffs into the lungs 4 times daily as needed for Wheezing, Disp: 18 g, Rfl: 0    clotrimazole-betamethasone (LOTRISONE) 1-0.05 % cream, Apply topically 2 times daily. , Disp: 45 g, Rfl: 0    fluticasone (FLONASE) 50 MCG/ACT nasal spray, 2 sprays by Each Nostril route daily, Disp: 48 g, Rfl: 1    pantoprazole (PROTONIX) 20 MG tablet, TAKE 1 TABLET BY MOUTH ONCE DAILY, Disp: , Rfl:     albuterol sulfate  (90 Base) MCG/ACT inhaler, Inhale 2 puffs into the lungs every 6 hours as needed for Wheezing, Disp: , Rfl:     montelukast (SINGULAIR) 10 MG tablet, Take 10 mg by mouth nightly., Disp: , Rfl:     albuterol (PROVENTIL) (2.5 MG/3ML) 0.083% nebulizer solution, Take 2.5 mg by nebulization every 6 hours as needed.   , Disp: , Rfl:     Allergies   Allergen Reactions    Ciprofloxacin     Levofloxacin Other (See Comments)     pain    Penicillins Hives       Social History     Tobacco Use    Smoking status: Former     Packs/day: 2.00     Years: 17.00     Pack years: 34.00     Types: Cigarettes     Start date: 18     Quit date: 1999     Years since quittin.2    Smokeless tobacco: Never   Vaping Use    Vaping Use: Never used   Substance Use Topics    Alcohol use: No    Drug use: Not Currently      Past Surgical History:   Procedure Laterality Date    APPENDECTOMY      COLONOSCOPY N/A 4/15/2021 COLONOSCOPY WITH BIOPSY performed by Melina Wang DO at [facility]      TONSILLECTOMY      UPPER GASTROINTESTINAL ENDOSCOPY N/A 4/15/2021    EGD ESOPHAGOGASTRODUODENOSCOPY DILATATION performed by Melina Wang DO at [facility] ENDOSCOPY     Family History   Problem Relation Age of Onset    Cancer Mother     Diabetes Mother      Past Medical History:   Diagnosis Date    Asthma     Diabetes mellitus ([address]     Hypertension     Prolonged emergence from general anesthesia        Vitals:    02/13/23 0756   BP: 136/84   Pulse: 78   Temp: 98 °F (36.7 °C)   SpO2: 96%   Weight: (!) 426 lb (193.2 kg)   Height: 6' 1\" (1.854 m)     BP Readings from Last 3 Encounters:   02/13/23 136/84   02/01/23 136/78   01/09/23 136/86        Physical Exam  Vitals and nursing note reviewed. Constitutional:       Appearance: He is well-developed. HENT:      Head: Normocephalic. Right Ear: External ear normal.      Left Ear: External ear normal.      Nose: Nose normal.   Eyes:      Conjunctiva/sclera: Conjunctivae normal.      Pupils: Pupils are equal, round, and reactive to light. Cardiovascular:      Rate and Rhythm: Normal rate. Pulmonary:      Breath sounds: Normal breath sounds. Abdominal:      General: Bowel sounds are normal.      Palpations: Abdomen is soft. Musculoskeletal:         General: Normal range of motion. Cervical back: Normal range of motion and neck supple. Skin:     General: Skin is warm and dry. Neurological:      Mental Status: He is alert and oriented to person, place, and time. Psychiatric:         Behavior: Behavior normal.   Today's vitals physical examination is stable. Heart was regular lungs are clear. Recent respiratory infection is improved. Medications again to continue as prescribed. Follow-up visit with me 3 months and physical at that time. Lab studies reviewed with him are very stable A1c well controlled.   Maintain present meds and care.    Lab Results   Component Value Date    TSH 1.430 01/04/2023    TSH 1.980 04/19/2019    [id number] 7.2 01/04/2023     Lab Results   Component Value Date    CHOL 182 01/04/2023    CHOL 185 07/12/2022     Lab Results   Component Value Date    TRIG 127 01/04/2023    TRIG 159 (H) 07/12/2022     Lab Results   Component Value Date    HDL 33 01/04/2023    HDL 34 07/12/2022     No results found for: [facility]  Lab Results   Component Value Date    LABVLDL 25 01/04/2023    LABVLDL 32 07/12/2022     Lab Results   Component Value Date    CHOLHDLRATIO 5.7 (H) 10/02/2017     Lab Results   Component Value Date    WBC 6.8 01/04/2023    HGB 15.0 01/04/2023    HCT 45.5 02/08/2023    MCV 85.0 01/04/2023     01/04/2023    LYMPHOPCT 17.8 (L) 01/04/2023    RBC 5.47 01/04/2023    MCH 27.4 01/04/2023    MCHC 32.3 01/04/2023    RDW 15.7 (H) 01/04/2023     Lab Results   Component Value Date     01/04/2023    K 4.4 01/04/2023     01/04/2023    CO2 23 01/04/2023    BUN 11 01/04/2023    CREATININE 0.8 01/04/2023    GLUCOSE 114 (H) 01/04/2023    CALCIUM 9.8 01/04/2023    PROT 7.3 02/08/2023    LABALBU 4.2 02/08/2023    BILITOT 0.5 02/08/2023    ALKPHOS 84 02/08/2023    AST 19 02/08/2023    ALT 21 02/08/2023    LABGLOM >60 01/04/2023    GFRAA >60 05/02/2022        Lab Results   Component Value Date    PSA 2.16 02/08/2023    PSA 2.02 11/03/2022    PSA 1.92 06/09/2022      Lab Results   Component Value Date    LABA1C 6.5 (H) 01/04/2023    LABA1C 6.0 (H) 05/02/2022    LABA1C 5.9 10/22/2021     No results found for: EAG          Plan Per Assessment:  Rc Jones was seen today for hypertension. Diagnoses and all orders for this visit:    Secondary hypertension  -     lisinopril (PRINIVIL;ZESTRIL) 40 MG tablet; Take 1/2 (one-half) tablet by mouth twice daily    Type 2 diabetes mellitus without complication, without long-term current use of insulin (HCC)  -     pioglitazone (ACTOS) 30 MG tablet;  Take 1/2 (one-half) tablet by mouth once daily            No follow-ups on file. Cezar Nichole,     Note was generated with the assistance of voice recognition software. Document was reviewed however may contain grammatical errors.

## 2023-02-28 ENCOUNTER — OFFICE VISIT (OUTPATIENT)
Dept: PRIMARY CARE CLINIC | Age: 58
End: 2023-02-28
Payer: COMMERCIAL

## 2023-02-28 VITALS — DIASTOLIC BLOOD PRESSURE: 78 MMHG | OXYGEN SATURATION: 98 % | HEART RATE: 85 BPM | SYSTOLIC BLOOD PRESSURE: 140 MMHG

## 2023-02-28 DIAGNOSIS — R20.0 NUMBNESS AND TINGLING OF BOTH LOWER EXTREMITIES: ICD-10-CM

## 2023-02-28 DIAGNOSIS — R20.2 NUMBNESS AND TINGLING OF BOTH UPPER EXTREMITIES: Primary | ICD-10-CM

## 2023-02-28 DIAGNOSIS — E29.1 HYPOTESTOSTERONEMIA IN MALE: ICD-10-CM

## 2023-02-28 DIAGNOSIS — R20.0 NUMBNESS AND TINGLING OF BOTH UPPER EXTREMITIES: Primary | ICD-10-CM

## 2023-02-28 DIAGNOSIS — E11.9 TYPE 2 DIABETES MELLITUS WITHOUT COMPLICATION, WITHOUT LONG-TERM CURRENT USE OF INSULIN (HCC): ICD-10-CM

## 2023-02-28 DIAGNOSIS — R20.2 NUMBNESS AND TINGLING OF BOTH LOWER EXTREMITIES: ICD-10-CM

## 2023-02-28 DIAGNOSIS — I15.9 SECONDARY HYPERTENSION: ICD-10-CM

## 2023-02-28 PROCEDURE — G8417 CALC BMI ABV UP PARAM F/U: HCPCS | Performed by: FAMILY MEDICINE

## 2023-02-28 PROCEDURE — 99214 OFFICE O/P EST MOD 30 MIN: CPT | Performed by: FAMILY MEDICINE

## 2023-02-28 PROCEDURE — 3017F COLORECTAL CA SCREEN DOC REV: CPT | Performed by: FAMILY MEDICINE

## 2023-02-28 PROCEDURE — 3044F HG A1C LEVEL LT 7.0%: CPT | Performed by: FAMILY MEDICINE

## 2023-02-28 PROCEDURE — G8484 FLU IMMUNIZE NO ADMIN: HCPCS | Performed by: FAMILY MEDICINE

## 2023-02-28 PROCEDURE — 1036F TOBACCO NON-USER: CPT | Performed by: FAMILY MEDICINE

## 2023-02-28 PROCEDURE — G8427 DOCREV CUR MEDS BY ELIG CLIN: HCPCS | Performed by: FAMILY MEDICINE

## 2023-02-28 PROCEDURE — 2022F DILAT RTA XM EVC RTNOPTHY: CPT | Performed by: FAMILY MEDICINE

## 2023-02-28 ASSESSMENT — ENCOUNTER SYMPTOMS
ALLERGIC/IMMUNOLOGIC NEGATIVE: 1
EYES NEGATIVE: 1
GASTROINTESTINAL NEGATIVE: 1
RESPIRATORY NEGATIVE: 1

## 2023-02-28 NOTE — PROGRESS NOTES
23     Nataliya Coker    : 1965 Sex: male   Age: 62 y.o. Chief Complaint   Patient presents with    Muscle Pain     Shoulder pain and has pins and needles sensation at times    Leg Pain     Calf muscle pain       Prior to Admission medications    Medication Sig Start Date End Date Taking? Authorizing Provider   TESTOSTERONE CYPIONATE IM Inject 200 mg into the muscle once. GEL   Yes Historical Provider, MD   ciclopirox (LOPROX) 0.77 % cream Apply topically 2 times daily Apply topically 2 times daily. Yes Historical Provider, MD   lisinopril (PRINIVIL;ZESTRIL) 40 MG tablet Take 1/2 (one-half) tablet by mouth twice daily 23  Yes Jeri Zapata DO   pioglitazone (ACTOS) 30 MG tablet Take 1/2 (one-half) tablet by mouth once daily 23  Yes Jeri Zapata DO   fexofenadine (ALLEGRA) 180 MG tablet Take 180 mg by mouth daily   Yes Historical Provider, MD   albuterol sulfate HFA (PROVENTIL;VENTOLIN;PROAIR) 108 (90 Base) MCG/ACT inhaler Inhale 2 puffs into the lungs 4 times daily as needed for Wheezing 22  Yes DONATO Dominguez III   fluticasone [facility]) 50 MCG/ACT nasal spray 2 sprays by Each Nostril route daily 22  Yes TIGIST Vernon - CNP   pantoprazole (PROTONIX) 20 MG tablet TAKE 1 TABLET BY MOUTH ONCE DAILY 9/15/21  Yes Historical Provider, MD   albuterol sulfate  (90 Base) MCG/ACT inhaler Inhale 2 puffs into the lungs every 6 hours as needed for Wheezing   Yes Historical Provider, MD   montelukast (SINGULAIR) 10 MG tablet Take 10 mg by mouth nightly. Yes Historical Provider, MD   albuterol (PROVENTIL) (2.5 MG/3ML) 0.083% nebulizer solution Take 2.5 mg by nebulization every 6 hours as needed. Yes Historical Provider, MD   clotrimazole-betamethasone (LOTRISONE) 1-0.05 % cream Apply topically 2 times daily.   Patient not taking: Reported on 2023   DONATO Dominguez III          HPI: Patient seen today numbness tingling both upper and lower extremities. Etiology uncertain at this point. We are going to follow through with EMG nerve conduction upper and lower extremities and referral made today to Dr. Shankar Grey. Hypertension diabetes controlled medications as prescribed. Low testosterone following with urology Dr. Stormy Randhawa          Review of Systems   Constitutional: Negative. HENT: Negative. Eyes: Negative. Respiratory: Negative. Gastrointestinal: Negative. Endocrine: Negative. Genitourinary: Negative. Musculoskeletal: Negative. Skin: Negative. Allergic/Immunologic: Negative. Neurological: Negative. Hematological: Negative. Psychiatric/Behavioral: Negative. Today systems review overall stable aside from the complaints of numbness tingling as noted. Additional work-up in progress. Low testosterone levels following with urology. Physical was planned for May. Current Outpatient Medications:     TESTOSTERONE CYPIONATE IM, Inject 200 mg into the muscle once. GEL, Disp: , Rfl:     ciclopirox (LOPROX) 0.77 % cream, Apply topically 2 times daily Apply topically 2 times daily. , Disp: , Rfl:     lisinopril (PRINIVIL;ZESTRIL) 40 MG tablet, Take 1/2 (one-half) tablet by mouth twice daily, Disp: 90 tablet, Rfl: 0    pioglitazone (ACTOS) 30 MG tablet, Take 1/2 (one-half) tablet by mouth once daily, Disp: 45 tablet, Rfl: 0    fexofenadine (ALLEGRA) 180 MG tablet, Take 180 mg by mouth daily, Disp: , Rfl:     albuterol sulfate HFA (PROVENTIL;VENTOLIN;PROAIR) 108 (90 Base) MCG/ACT inhaler, Inhale 2 puffs into the lungs 4 times daily as needed for Wheezing, Disp: 18 g, Rfl: 0    fluticasone (FLONASE) 50 MCG/ACT nasal spray, 2 sprays by Each Nostril route daily, Disp: 48 g, Rfl: 1    pantoprazole (PROTONIX) 20 MG tablet, TAKE 1 TABLET BY MOUTH ONCE DAILY, Disp: , Rfl:     albuterol sulfate  (90 Base) MCG/ACT inhaler, Inhale 2 puffs into the lungs every 6 hours as needed for Wheezing, Disp: , Rfl: montelukast (SINGULAIR) 10 MG tablet, Take 10 mg by mouth nightly., Disp: , Rfl:     albuterol (PROVENTIL) (2.5 MG/3ML) 0.083% nebulizer solution, Take 2.5 mg by nebulization every 6 hours as needed. , Disp: , Rfl:     clotrimazole-betamethasone (LOTRISONE) 1-0.05 % cream, Apply topically 2 times daily. (Patient not taking: Reported on 2023), Disp: 45 g, Rfl: 0    Allergies   Allergen Reactions    Ciprofloxacin     Levofloxacin Other (See Comments)     pain    Penicillins Hives       Social History     Tobacco Use    Smoking status: Former     Packs/day: 2.00     Years: 17.00     Pack years: 34.00     Types: Cigarettes     Start date:      Quit date: 1999     Years since quittin.2    Smokeless tobacco: Never   Vaping Use    Vaping Use: Never used   Substance Use Topics    Alcohol use: No    Drug use: Not Currently      Past Surgical History:   Procedure Laterality Date    APPENDECTOMY      COLONOSCOPY N/A 4/15/2021    COLONOSCOPY WITH BIOPSY performed by Lu Zarco DO at [facility] [address] ENDOSCOPY N/A 4/15/2021    EGD ESOPHAGOGASTRODUODENOSCOPY DILATATION performed by Lu Zarco DO at [facility] ENDOSCOPY     Family History   Problem Relation Age of Onset    Cancer Mother     Diabetes Mother      Past Medical History:   Diagnosis Date    Asthma     Diabetes mellitus ([facility] [address]     Hypertension     Prolonged emergence from general anesthesia        Vitals:    23 1641   BP: (!) 140/78   Pulse: 85   SpO2: 98%     BP Readings from Last 3 Encounters:   23 (!) 140/78   23 136/84   23 136/78        Physical Exam  Vitals and nursing note reviewed. Constitutional:       Appearance: He is well-developed. HENT:      Head: Normocephalic.       Right Ear: External ear normal.      Left Ear: External ear normal.      Nose: Nose normal.   Eyes:      Conjunctiva/sclera: Conjunctivae normal.      Pupils: Pupils are equal, round, and reactive to light. Cardiovascular:      Rate and Rhythm: Normal rate. Pulmonary:      Breath sounds: Normal breath sounds. Abdominal:      General: Bowel sounds are normal.      Palpations: Abdomen is soft. Musculoskeletal:         General: Normal range of motion. Cervical back: Normal range of motion and neck supple. Skin:     General: Skin is warm and dry. Neurological:      Mental Status: He is alert and oriented to person, place, and time. Psychiatric:         Behavior: Behavior normal.      Today's vitals physical examination stable. Pressures controlled. Heart was regular lungs are clear. Medications as prescribed. Reassessment with me in May and certainly sooner if need be. EMG nerve conduction studies to be completed we will follow-up results and discuss with patient at that time. He asked about resuming baby aspirin and he will resume once daily. Plan Per Assessment:  Shabana Roy was seen today for muscle pain and leg pain. Diagnoses and all orders for this visit:    Numbness and tingling of both upper extremities  -     Amb External Referral To Physical Medicine Rehab    Numbness and tingling of both lower extremities  -     Amb External Referral To Physical Medicine Rehab    Secondary hypertension    Type 2 diabetes mellitus without complication, without long-term current use of insulin ([facility] [address]    Hypotestosteronemia in male          No follow-ups on file. Venice Rodrigues DO    Note was generated with the assistance of voice recognition software. Document was reviewed however may contain grammatical errors.

## 2023-03-17 ENCOUNTER — OFFICE VISIT (OUTPATIENT)
Dept: FAMILY MEDICINE CLINIC | Age: 58
End: 2023-03-17
Payer: COMMERCIAL

## 2023-03-17 VITALS
DIASTOLIC BLOOD PRESSURE: 82 MMHG | SYSTOLIC BLOOD PRESSURE: 130 MMHG | BODY MASS INDEX: 41.75 KG/M2 | RESPIRATION RATE: 16 BRPM | WEIGHT: 315 LBS | HEART RATE: 85 BPM | HEIGHT: 73 IN | TEMPERATURE: 98.5 F | OXYGEN SATURATION: 97 %

## 2023-03-17 DIAGNOSIS — R09.81 SINUS CONGESTION: ICD-10-CM

## 2023-03-17 DIAGNOSIS — J01.90 ACUTE NON-RECURRENT SINUSITIS, UNSPECIFIED LOCATION: Primary | ICD-10-CM

## 2023-03-17 PROCEDURE — G8427 DOCREV CUR MEDS BY ELIG CLIN: HCPCS | Performed by: NURSE PRACTITIONER

## 2023-03-17 PROCEDURE — 1036F TOBACCO NON-USER: CPT | Performed by: NURSE PRACTITIONER

## 2023-03-17 PROCEDURE — 99213 OFFICE O/P EST LOW 20 MIN: CPT | Performed by: NURSE PRACTITIONER

## 2023-03-17 PROCEDURE — G8417 CALC BMI ABV UP PARAM F/U: HCPCS | Performed by: NURSE PRACTITIONER

## 2023-03-17 PROCEDURE — 3017F COLORECTAL CA SCREEN DOC REV: CPT | Performed by: NURSE PRACTITIONER

## 2023-03-17 PROCEDURE — G8484 FLU IMMUNIZE NO ADMIN: HCPCS | Performed by: NURSE PRACTITIONER

## 2023-03-17 RX ORDER — PREDNISONE 10 MG/1
10 TABLET ORAL DAILY
Qty: 5 TABLET | Refills: 0 | Status: SHIPPED | OUTPATIENT
Start: 2023-03-17 | End: 2023-03-22

## 2023-03-17 RX ORDER — CLINDAMYCIN HYDROCHLORIDE 300 MG/1
300 CAPSULE ORAL 3 TIMES DAILY
Qty: 30 CAPSULE | Refills: 0 | Status: SHIPPED | OUTPATIENT
Start: 2023-03-17 | End: 2023-03-27

## 2023-03-17 NOTE — PROGRESS NOTES
3/17/23  Henrik Polanco : 1965 Sex: male  Age 62 y.o. Subjective:  Chief Complaint   Patient presents with    Cough     Drainage for awhile but now feels like a golf ball in throat. Pharyngitis    Otalgia       HPI:   Henrik Polanco , 62 y.o. male presents to the clinic for evaluation of sinus congestion / drainage x 2 weeks. The patient also reports sore throat and mild cough. The patient has taken any treatment for symptoms. The patient reports worsening symptoms over time. The patient denies known ill exposure. The patient denies headache, rash, and fever. The patient also denies chest pain, abdominal pain, shortness of breath, and nausea / vomiting / diarrhea. ROS:   Unless otherwise stated in this report the patient's positive and negative responses for review of systems for constitutional, eyes, ENT, cardiovascular, respiratory, gastrointestinal, neurological, , musculoskeletal, and integument systems and related systems to the presenting problem are either stated in the history of present illness or were not pertinent or were negative for the symptoms and/or complaints related to the presenting medical problem. Positives and pertinent negatives as per HPI. All others reviewed and are negative.       PMH:     Past Medical History:   Diagnosis Date    Asthma     Diabetes mellitus ([address]     Hypertension     Prolonged emergence from general anesthesia        Past Surgical History:   Procedure Laterality Date    APPENDECTOMY      COLONOSCOPY N/A 4/15/2021    COLONOSCOPY WITH BIOPSY performed by Renny Philippe DO at [place] [id number]      TONSILLECTOMY      UPPER GASTROINTESTINAL ENDOSCOPY N/A 4/15/2021    EGD ESOPHAGOGASTRODUODENOSCOPY DILATATION performed by Renny Philippe DO at [facility] ENDOSCOPY       Family History   Problem Relation Age of Onset    Cancer Mother     Diabetes Mother        Medications:     Current Outpatient Medications:     ASPIRIN 81 PO, Take by mouth, Disp: , Rfl:     clindamycin (CLEOCIN) 300 MG capsule, Take 1 capsule by mouth 3 times daily for 10 days, Disp: 30 capsule, Rfl: 0    predniSONE (DELTASONE) 10 MG tablet, Take 1 tablet by mouth daily for 5 days, Disp: 5 tablet, Rfl: 0    TESTOSTERONE CYPIONATE IM, Inject 200 mg into the muscle once. GEL, Disp: , Rfl:     ciclopirox (LOPROX) 0.77 % cream, Apply topically 2 times daily Apply topically 2 times daily. , Disp: , Rfl:     lisinopril (PRINIVIL;ZESTRIL) 40 MG tablet, Take 1/2 (one-half) tablet by mouth twice daily, Disp: 90 tablet, Rfl: 0    pioglitazone (ACTOS) 30 MG tablet, Take 1/2 (one-half) tablet by mouth once daily, Disp: 45 tablet, Rfl: 0    fexofenadine (ALLEGRA) 180 MG tablet, Take 180 mg by mouth daily, Disp: , Rfl:     albuterol sulfate HFA (PROVENTIL;VENTOLIN;PROAIR) 108 (90 Base) MCG/ACT inhaler, Inhale 2 puffs into the lungs 4 times daily as needed for Wheezing, Disp: 18 g, Rfl: 0    fluticasone (FLONASE) 50 MCG/ACT nasal spray, 2 sprays by Each Nostril route daily, Disp: 48 g, Rfl: 1    pantoprazole (PROTONIX) 20 MG tablet, TAKE 1 TABLET BY MOUTH ONCE DAILY, Disp: , Rfl:     albuterol sulfate  (90 Base) MCG/ACT inhaler, Inhale 2 puffs into the lungs every 6 hours as needed for Wheezing, Disp: , Rfl:     montelukast (SINGULAIR) 10 MG tablet, Take 10 mg by mouth nightly., Disp: , Rfl:     albuterol (PROVENTIL) (2.5 MG/3ML) 0.083% nebulizer solution, Take 2.5 mg by nebulization every 6 hours as needed. , Disp: , Rfl:     Allergies:      Allergies   Allergen Reactions    Ciprofloxacin     Levofloxacin Other (See Comments)     pain    Penicillins Hives       Social History:     Social History     Tobacco Use    Smoking status: Former     Packs/day: 2.00     Years: 17.00     Pack years: 34.00     Types: Cigarettes     Start date: 18     Quit date: 1999     Years since quittin.2    Smokeless tobacco: Never   Vaping Use    Vaping Use: Never used   Substance Use Topics Alcohol use: No    Drug use: Not Currently       Physical Exam:     Vitals:    03/17/23 1306   BP: 130/82   Pulse: 85   Resp: 16   Temp: 98.5 °F (36.9 °C)   SpO2: 97%   Weight: (!) 426 lb (193.2 kg)   Height: 6' 1\" (1.854 m)       Physical Exam (PE)    Physical Exam  Constitutional:       Appearance: Normal appearance. HENT:      Head: Normocephalic. Right Ear: Tympanic membrane, ear canal and external ear normal.      Left Ear: Tympanic membrane, ear canal and external ear normal.      Nose: Congestion and rhinorrhea present. Mouth/Throat:      Mouth: Mucous membranes are moist.      Pharynx: Oropharynx is clear. Posterior oropharyngeal erythema present. No oropharyngeal exudate. Eyes:      Pupils: Pupils are equal, round, and reactive to light. Cardiovascular:      Rate and Rhythm: Normal rate and regular rhythm. Pulses: Normal pulses. Heart sounds: Normal heart sounds. Pulmonary:      Effort: Pulmonary effort is normal.      Breath sounds: Normal breath sounds. No wheezing, rhonchi or rales. Abdominal:      General: Bowel sounds are normal.      Palpations: Abdomen is soft. Musculoskeletal:         General: Normal range of motion. Cervical back: Normal range of motion and neck supple. Lymphadenopathy:      Cervical: No cervical adenopathy. Skin:     General: Skin is warm and dry. Capillary Refill: Capillary refill takes less than 2 seconds. Neurological:      General: No focal deficit present. Mental Status: He is alert and oriented to person, place, and time. Psychiatric:         Mood and Affect: Mood normal.         Behavior: Behavior normal.        Testing:   (All laboratory and radiology results have been personally reviewed by myself)  Labs:  No results found for this visit on 03/17/23. Imaging: All Radiology results interpreted by Radiologist unless otherwise noted.   No orders to display       Assessment / Plan:   The patient's vitals, allergies, medications, and past medical history have been reviewed. Julius Else was seen today for cough, pharyngitis and otalgia. Diagnoses and all orders for this visit:    Acute non-recurrent sinusitis, unspecified location  -     clindamycin (CLEOCIN) 300 MG capsule; Take 1 capsule by mouth 3 times daily for 10 days  -     predniSONE (DELTASONE) 10 MG tablet; Take 1 tablet by mouth daily for 5 days    Sinus congestion      - Disposition: Home    - Educational material printed for patient's review and were included in patient instructions. After Visit Summary was given to patient at the end of visit. - Patient request prednisone and clindamycin for treatment. - Encouraged oral fluids and rest. Discussed symptomatic treatments with patient today. The patient is to follow-up with PCP in the next 2-3 days for reevaluation. Red flag symptoms were also discussed with the patient today. If symptoms worsen the patient is to go directly to the emergency department for reevaluation and treatment. Pt verbalizes understanding and is in agreement with plan of care. All questions answered. SIGNATURE: TIGIST Ruano-CNP    *NOTE: This report was transcribed using voice recognition software. Every effort was made to ensure accuracy; however, inadvertent computerized transcription errors may be present.

## 2023-03-21 LAB — DIABETIC RETINOPATHY: NEGATIVE

## 2023-05-04 ENCOUNTER — OFFICE VISIT (OUTPATIENT)
Dept: FAMILY MEDICINE CLINIC | Age: 58
End: 2023-05-04
Payer: COMMERCIAL

## 2023-05-04 VITALS
OXYGEN SATURATION: 94 % | BODY MASS INDEX: 41.75 KG/M2 | SYSTOLIC BLOOD PRESSURE: 134 MMHG | HEIGHT: 73 IN | HEART RATE: 90 BPM | WEIGHT: 315 LBS | TEMPERATURE: 97.6 F | DIASTOLIC BLOOD PRESSURE: 80 MMHG

## 2023-05-04 DIAGNOSIS — R30.0 DYSURIA: ICD-10-CM

## 2023-05-04 DIAGNOSIS — R35.0 URINARY FREQUENCY: Primary | ICD-10-CM

## 2023-05-04 LAB
APPEARANCE FLUID: CLEAR
BILIRUBIN, POC: NEGATIVE
BLOOD URINE, POC: NORMAL
CLARITY, POC: CLEAR
COLOR, POC: YELLOW
GLUCOSE URINE, POC: NEGATIVE
KETONES, POC: NEGATIVE
LEUKOCYTE EST, POC: NEGATIVE
NITRITE, POC: NEGATIVE
PH, POC: 6.5
PROTEIN, POC: NEGATIVE
SPECIFIC GRAVITY, POC: 1.03
UROBILINOGEN, POC: 0.2

## 2023-05-04 PROCEDURE — 81002 URINALYSIS NONAUTO W/O SCOPE: CPT | Performed by: PHYSICIAN ASSISTANT

## 2023-05-04 PROCEDURE — 1036F TOBACCO NON-USER: CPT | Performed by: PHYSICIAN ASSISTANT

## 2023-05-04 PROCEDURE — 3017F COLORECTAL CA SCREEN DOC REV: CPT | Performed by: PHYSICIAN ASSISTANT

## 2023-05-04 PROCEDURE — G8417 CALC BMI ABV UP PARAM F/U: HCPCS | Performed by: PHYSICIAN ASSISTANT

## 2023-05-04 PROCEDURE — G8427 DOCREV CUR MEDS BY ELIG CLIN: HCPCS | Performed by: PHYSICIAN ASSISTANT

## 2023-05-04 PROCEDURE — 99214 OFFICE O/P EST MOD 30 MIN: CPT | Performed by: PHYSICIAN ASSISTANT

## 2023-05-04 NOTE — PROGRESS NOTES
23  Bonnita Libman : 1965 Sex: male  Age 62 y.o. Subjective:  Chief Complaint   Patient presents with    Urinary Tract Infection     Frequently urinating. HPI:   Bonnita Libman , 62 y.o. male presents to express care for evaluation of frequent urination    HPI  42-year-old male presents to express care for evaluation of urinary frequency. The patient has had the symptoms over the last couple days. The patient has had the urge to urinate more frequently. The patient is not really having any burning with urination. The patient does have a history of diabetes. States that his blood sugar has not been elevated. Signs of back pain, flank pain. He has occasional aches in his back but nothing substantial.  The patient is not having nausea, vomiting, fevers. No blood in the urine. ROS:   Unless otherwise stated in this report the patient's positive and negative responses for review of systems for constitutional, eyes, ENT, cardiovascular, respiratory, gastrointestinal, neurological, , musculoskeletal, and integument systems and related systems to the presenting problem are either stated in the history of present illness or were not pertinent or were negative for the symptoms and/or complaints related to the presenting medical problem. Positives and pertinent negatives as per HPI. All others reviewed and are negative.       PMH:     Past Medical History:   Diagnosis Date    Asthma     Diabetes mellitus ([address]     Hypertension     Prolonged emergence from general anesthesia        Past Surgical History:   Procedure Laterality Date    APPENDECTOMY      COLONOSCOPY N/A 4/15/2021    COLONOSCOPY WITH BIOPSY performed by Mendy Gillis DO at [address] [place] [address] GASTROINTESTINAL ENDOSCOPY N/A 4/15/2021    EGD ESOPHAGOGASTRODUODENOSCOPY DILATATION performed by Mendy Gillis DO at [facility] ENDOSCOPY       Family History   Problem Relation

## 2023-05-09 DIAGNOSIS — E11.9 TYPE 2 DIABETES MELLITUS WITHOUT COMPLICATION, WITHOUT LONG-TERM CURRENT USE OF INSULIN (HCC): ICD-10-CM

## 2023-05-09 DIAGNOSIS — I15.9 SECONDARY HYPERTENSION: ICD-10-CM

## 2023-05-09 LAB
ALBUMIN SERPL-MCNC: 4.3 G/DL (ref 3.5–5.2)
ALBUMIN SERPL-MCNC: 4.4 G/DL (ref 3.5–5.2)
ALP SERPL-CCNC: 83 U/L (ref 40–129)
ALP SERPL-CCNC: 85 U/L (ref 40–129)
ALT SERPL-CCNC: 17 U/L (ref 0–40)
ALT SERPL-CCNC: 18 U/L (ref 0–40)
ANION GAP SERPL CALCULATED.3IONS-SCNC: 13 MMOL/L (ref 7–16)
AST SERPL-CCNC: 18 U/L (ref 0–39)
AST SERPL-CCNC: 19 U/L (ref 0–39)
BASOPHILS # BLD: 0.07 E9/L (ref 0–0.2)
BASOPHILS NFR BLD: 1 % (ref 0–2)
BILIRUB DIRECT SERPL-MCNC: <0.2 MG/DL (ref 0–0.3)
BILIRUB INDIRECT SERPL-MCNC: NORMAL MG/DL (ref 0–1)
BILIRUB SERPL-MCNC: 0.5 MG/DL (ref 0–1.2)
BILIRUB SERPL-MCNC: 0.5 MG/DL (ref 0–1.2)
BUN SERPL-MCNC: 17 MG/DL (ref 6–20)
CALCIUM SERPL-MCNC: 9.7 MG/DL (ref 8.6–10.2)
CHLORIDE SERPL-SCNC: 98 MMOL/L (ref 98–107)
CHOLESTEROL, TOTAL: 195 MG/DL (ref 0–199)
CO2 SERPL-SCNC: 24 MMOL/L (ref 22–29)
CREAT SERPL-MCNC: 1 MG/DL (ref 0.7–1.2)
EOSINOPHIL # BLD: 0.25 E9/L (ref 0.05–0.5)
EOSINOPHIL NFR BLD: 3.5 % (ref 0–6)
ERYTHROCYTE [DISTWIDTH] IN BLOOD BY AUTOMATED COUNT: 14.8 FL (ref 11.5–15)
GLUCOSE SERPL-MCNC: 117 MG/DL (ref 74–99)
HBA1C MFR BLD: 6.5 % (ref 4–5.6)
HCT VFR BLD AUTO: 49.3 % (ref 37–54)
HCT VFR BLD AUTO: 50 % (ref 37–54)
HDLC SERPL-MCNC: 35 MG/DL
HGB BLD-MCNC: 15.3 G/DL (ref 12.5–16.5)
IMM GRANULOCYTES # BLD: 0.05 E9/L
IMM GRANULOCYTES NFR BLD: 0.7 % (ref 0–5)
LDLC SERPL CALC-MCNC: 132 MG/DL (ref 0–99)
LYMPHOCYTES # BLD: 1.94 E9/L (ref 1.5–4)
LYMPHOCYTES NFR BLD: 27 % (ref 20–42)
MCH RBC QN AUTO: 27 PG (ref 26–35)
MCHC RBC AUTO-ENTMCNC: 30.6 % (ref 32–34.5)
MCV RBC AUTO: 88.3 FL (ref 80–99.9)
MONOCYTES # BLD: 0.54 E9/L (ref 0.1–0.95)
MONOCYTES NFR BLD: 7.5 % (ref 2–12)
NEUTROPHILS # BLD: 4.33 E9/L (ref 1.8–7.3)
NEUTS SEG NFR BLD: 60.3 % (ref 43–80)
PLATELET # BLD AUTO: 275 E9/L (ref 130–450)
PMV BLD AUTO: 10 FL (ref 7–12)
POTASSIUM SERPL-SCNC: 4.8 MMOL/L (ref 3.5–5)
PROT SERPL-MCNC: 7.6 G/DL (ref 6.4–8.3)
PROT SERPL-MCNC: 7.7 G/DL (ref 6.4–8.3)
PSA SERPL-MCNC: 1.63 NG/ML (ref 0–4)
RBC # BLD AUTO: 5.66 E12/L (ref 3.8–5.8)
SODIUM SERPL-SCNC: 135 MMOL/L (ref 132–146)
T4 SERPL-MCNC: 7 MCG/DL (ref 4.5–11.7)
TESTOST SERPL-MCNC: 203.2 NG/DL
TRIGL SERPL-MCNC: 138 MG/DL (ref 0–149)
TSH SERPL-MCNC: 2.83 UIU/ML (ref 0.27–4.2)
VLDLC SERPL CALC-MCNC: 28 MG/DL
WBC # BLD: 7.2 E9/L (ref 4.5–11.5)

## 2023-05-16 ENCOUNTER — OFFICE VISIT (OUTPATIENT)
Dept: PRIMARY CARE CLINIC | Age: 58
End: 2023-05-16

## 2023-05-16 VITALS
WEIGHT: 315 LBS | OXYGEN SATURATION: 98 % | TEMPERATURE: 98.1 F | HEART RATE: 77 BPM | DIASTOLIC BLOOD PRESSURE: 78 MMHG | SYSTOLIC BLOOD PRESSURE: 132 MMHG | BODY MASS INDEX: 55.54 KG/M2

## 2023-05-16 DIAGNOSIS — R20.0 NUMBNESS AND TINGLING OF BOTH LOWER EXTREMITIES: ICD-10-CM

## 2023-05-16 DIAGNOSIS — Z00.00 ANNUAL PHYSICAL EXAM: Primary | ICD-10-CM

## 2023-05-16 DIAGNOSIS — I15.9 SECONDARY HYPERTENSION: ICD-10-CM

## 2023-05-16 DIAGNOSIS — G56.03 BILATERAL CARPAL TUNNEL SYNDROME: ICD-10-CM

## 2023-05-16 DIAGNOSIS — E11.9 TYPE 2 DIABETES MELLITUS WITHOUT COMPLICATION, WITHOUT LONG-TERM CURRENT USE OF INSULIN (HCC): ICD-10-CM

## 2023-05-16 DIAGNOSIS — R31.21 ASYMPTOMATIC MICROSCOPIC HEMATURIA: ICD-10-CM

## 2023-05-16 DIAGNOSIS — R20.2 NUMBNESS AND TINGLING OF BOTH LOWER EXTREMITIES: ICD-10-CM

## 2023-05-16 DIAGNOSIS — E29.1 HYPOTESTOSTERONEMIA IN MALE: ICD-10-CM

## 2023-05-16 DIAGNOSIS — G62.9 POLYNEUROPATHY: ICD-10-CM

## 2023-05-16 RX ORDER — LISINOPRIL 40 MG/1
TABLET ORAL
Qty: 90 TABLET | Refills: 0 | Status: SHIPPED | OUTPATIENT
Start: 2023-05-16

## 2023-05-16 RX ORDER — GABAPENTIN 100 MG/1
100 CAPSULE ORAL NIGHTLY
Qty: 30 CAPSULE | Refills: 0 | Status: SHIPPED | OUTPATIENT
Start: 2023-05-16 | End: 2023-06-15

## 2023-05-16 ASSESSMENT — ENCOUNTER SYMPTOMS
RESPIRATORY NEGATIVE: 1
ALLERGIC/IMMUNOLOGIC NEGATIVE: 1
GASTROINTESTINAL NEGATIVE: 1
EYES NEGATIVE: 1

## 2023-05-16 NOTE — PROGRESS NOTES
Comments)     pain    Penicillins Hives       Social History     Tobacco Use    Smoking status: Former     Packs/day: 2.00     Years: 17.00     Pack years: 34.00     Types: Cigarettes     Start date: 18     Quit date: 1999     Years since quittin.4    Smokeless tobacco: Never   Vaping Use    Vaping Use: Never used   Substance Use Topics    Alcohol use: No    Drug use: Not Currently      Past Surgical History:   Procedure Laterality Date    APPENDECTOMY      COLONOSCOPY N/A 4/15/2021    COLONOSCOPY WITH BIOPSY performed by Adrienne Castrejon DO at [facility] [address] ENDOSCOPY N/A 4/15/2021    EGD ESOPHAGOGASTRODUODENOSCOPY DILATATION performed by Adrienne Castrejon DO at [facility] ENDOSCOPY     Family History   Problem Relation Age of Onset    Cancer Mother     Diabetes Mother      Past Medical History:   Diagnosis Date    Asthma     Diabetes mellitus ([facility] [address]     Hypertension     Prolonged emergence from general anesthesia        Vitals:    23 0646   BP: 132/78   Pulse: 77   Temp: 98.1 °F (36.7 °C)   SpO2: 98%   Weight: (!) 421 lb (191 kg)     BP Readings from Last 3 Encounters:   23 132/78   23 134/80   23 130/82        Physical Exam  Vitals and nursing note reviewed. Constitutional:       Appearance: He is well-developed. HENT:      Head: Normocephalic. Right Ear: External ear normal.      Left Ear: External ear normal.      Nose: Nose normal.   Eyes:      Conjunctiva/sclera: Conjunctivae normal.      Pupils: Pupils are equal, round, and reactive to light. Cardiovascular:      Rate and Rhythm: Normal rate. Pulmonary:      Breath sounds: Normal breath sounds. Abdominal:      General: Bowel sounds are normal.      Palpations: Abdomen is soft. Musculoskeletal:         General: Normal range of motion. Cervical back: Normal range of motion and neck supple. Skin:     General: Skin is warm and dry.

## 2023-06-07 ENCOUNTER — OFFICE VISIT (OUTPATIENT)
Dept: FAMILY MEDICINE CLINIC | Age: 58
End: 2023-06-07
Payer: COMMERCIAL

## 2023-06-07 VITALS
DIASTOLIC BLOOD PRESSURE: 80 MMHG | OXYGEN SATURATION: 98 % | HEART RATE: 84 BPM | SYSTOLIC BLOOD PRESSURE: 130 MMHG | TEMPERATURE: 98.9 F

## 2023-06-07 DIAGNOSIS — R09.81 SINUS CONGESTION: Primary | ICD-10-CM

## 2023-06-07 DIAGNOSIS — H10.503 BLEPHAROCONJUNCTIVITIS OF BOTH EYES, UNSPECIFIED BLEPHAROCONJUNCTIVITIS TYPE: ICD-10-CM

## 2023-06-07 PROCEDURE — 3017F COLORECTAL CA SCREEN DOC REV: CPT | Performed by: FAMILY MEDICINE

## 2023-06-07 PROCEDURE — G8417 CALC BMI ABV UP PARAM F/U: HCPCS | Performed by: FAMILY MEDICINE

## 2023-06-07 PROCEDURE — 99213 OFFICE O/P EST LOW 20 MIN: CPT | Performed by: FAMILY MEDICINE

## 2023-06-07 PROCEDURE — 1036F TOBACCO NON-USER: CPT | Performed by: FAMILY MEDICINE

## 2023-06-07 PROCEDURE — G8428 CUR MEDS NOT DOCUMENT: HCPCS | Performed by: FAMILY MEDICINE

## 2023-06-07 RX ORDER — PREDNISONE 5 MG/1
TABLET ORAL
Qty: 30 TABLET | Refills: 0 | Status: SHIPPED | OUTPATIENT
Start: 2023-06-07

## 2023-06-07 RX ORDER — TOBRAMYCIN 3 MG/ML
SOLUTION/ DROPS OPHTHALMIC
Qty: 5 ML | Refills: 0 | Status: SHIPPED | OUTPATIENT
Start: 2023-06-07

## 2023-06-07 RX ORDER — CEFDINIR 300 MG/1
300 CAPSULE ORAL 2 TIMES DAILY
Qty: 20 CAPSULE | Refills: 0 | Status: SHIPPED | OUTPATIENT
Start: 2023-06-07 | End: 2023-06-17

## 2023-06-07 ASSESSMENT — ENCOUNTER SYMPTOMS
GASTROINTESTINAL NEGATIVE: 1
EYE DISCHARGE: 1
ALLERGIC/IMMUNOLOGIC NEGATIVE: 1

## 2023-06-07 NOTE — PROGRESS NOTES
Rfl:     pioglitazone (ACTOS) 30 MG tablet, Take 1/2 (one-half) tablet by mouth once daily, Disp: 45 tablet, Rfl: 0    fexofenadine (ALLEGRA) 180 MG tablet, Take 1 tablet by mouth daily, Disp: , Rfl:     albuterol sulfate HFA (PROVENTIL;VENTOLIN;PROAIR) 108 (90 Base) MCG/ACT inhaler, Inhale 2 puffs into the lungs 4 times daily as needed for Wheezing, Disp: 18 g, Rfl: 0    fluticasone (FLONASE) 50 MCG/ACT nasal spray, 2 sprays by Each Nostril route daily, Disp: 48 g, Rfl: 1    pantoprazole (PROTONIX) 20 MG tablet, TAKE 1 TABLET BY MOUTH ONCE DAILY, Disp: , Rfl:     montelukast (SINGULAIR) 10 MG tablet, Take 1 tablet by mouth nightly, Disp: , Rfl:     albuterol (PROVENTIL) (2.5 MG/3ML) 0.083% nebulizer solution, Take 3 mLs by nebulization every 6 hours as needed, Disp: , Rfl:     Allergies   Allergen Reactions    Ciprofloxacin     Levofloxacin Other (See Comments)     pain    Penicillins Hives       Social History     Tobacco Use    Smoking status: Former     Packs/day: 2.00     Years: 17.00     Pack years: 34.00     Types: Cigarettes     Start date:      Quit date: 1999     Years since quittin.5    Smokeless tobacco: Never   Vaping Use    Vaping Use: Never used   Substance Use Topics    Alcohol use: No    Drug use: Not Currently      Past Surgical History:   Procedure Laterality Date    APPENDECTOMY      COLONOSCOPY N/A 4/15/2021    COLONOSCOPY WITH BIOPSY performed by Ole Barnett DO at [facility]      TONSILLECTOMY      UPPER GASTROINTESTINAL ENDOSCOPY N/A 4/15/2021    EGD ESOPHAGOGASTRODUODENOSCOPY DILATATION performed by Ole Barnett DO at [facility] ENDOSCOPY     Family History   Problem Relation Age of Onset    Cancer Mother     Diabetes Mother      Past Medical History:   Diagnosis Date    Asthma     Diabetes mellitus ([facility] [address]     Hypertension     Prolonged emergence from general anesthesia        Vitals:    23 1340   BP: 130/80   Pulse: 84   Temp: 98.9 °F

## 2023-06-15 PROBLEM — Z00.00 ANNUAL PHYSICAL EXAM: Status: RESOLVED | Noted: 2023-05-16 | Resolved: 2023-06-15

## 2023-06-19 ENCOUNTER — OFFICE VISIT (OUTPATIENT)
Dept: PRIMARY CARE CLINIC | Age: 58
End: 2023-06-19
Payer: COMMERCIAL

## 2023-06-19 VITALS
SYSTOLIC BLOOD PRESSURE: 128 MMHG | TEMPERATURE: 98.2 F | BODY MASS INDEX: 41.75 KG/M2 | WEIGHT: 315 LBS | HEIGHT: 73 IN | OXYGEN SATURATION: 98 % | HEART RATE: 80 BPM | DIASTOLIC BLOOD PRESSURE: 76 MMHG

## 2023-06-19 DIAGNOSIS — I15.9 SECONDARY HYPERTENSION: ICD-10-CM

## 2023-06-19 DIAGNOSIS — G62.9 POLYNEUROPATHY: ICD-10-CM

## 2023-06-19 DIAGNOSIS — E11.9 TYPE 2 DIABETES MELLITUS WITHOUT COMPLICATION, WITHOUT LONG-TERM CURRENT USE OF INSULIN (HCC): ICD-10-CM

## 2023-06-19 DIAGNOSIS — G56.03 BILATERAL CARPAL TUNNEL SYNDROME: ICD-10-CM

## 2023-06-19 DIAGNOSIS — E29.1 HYPOTESTOSTERONEMIA IN MALE: Primary | ICD-10-CM

## 2023-06-19 LAB
CRP SERPL HS-MCNC: 1.3 MG/DL (ref 0–0.4)
ERYTHROCYTE [SEDIMENTATION RATE] IN BLOOD BY WESTERGREN METHOD: 19 MM/HR (ref 0–15)
RHEUMATOID FACT SER NEPH-ACNC: 10 IU/ML (ref 0–13)

## 2023-06-19 PROCEDURE — 1036F TOBACCO NON-USER: CPT | Performed by: FAMILY MEDICINE

## 2023-06-19 PROCEDURE — G8427 DOCREV CUR MEDS BY ELIG CLIN: HCPCS | Performed by: FAMILY MEDICINE

## 2023-06-19 PROCEDURE — 3044F HG A1C LEVEL LT 7.0%: CPT | Performed by: FAMILY MEDICINE

## 2023-06-19 PROCEDURE — 2022F DILAT RTA XM EVC RTNOPTHY: CPT | Performed by: FAMILY MEDICINE

## 2023-06-19 PROCEDURE — 99214 OFFICE O/P EST MOD 30 MIN: CPT | Performed by: FAMILY MEDICINE

## 2023-06-19 PROCEDURE — 3017F COLORECTAL CA SCREEN DOC REV: CPT | Performed by: FAMILY MEDICINE

## 2023-06-19 PROCEDURE — G8417 CALC BMI ABV UP PARAM F/U: HCPCS | Performed by: FAMILY MEDICINE

## 2023-06-19 RX ORDER — LISINOPRIL 40 MG/1
TABLET ORAL
Qty: 90 TABLET | Refills: 0 | Status: SHIPPED | OUTPATIENT
Start: 2023-06-19

## 2023-06-19 RX ORDER — PIOGLITAZONEHYDROCHLORIDE 30 MG/1
TABLET ORAL
Qty: 45 TABLET | Refills: 0 | Status: SHIPPED | OUTPATIENT
Start: 2023-06-19

## 2023-06-19 RX ORDER — GABAPENTIN 100 MG/1
100 CAPSULE ORAL NIGHTLY
Qty: 30 CAPSULE | Refills: 3 | Status: SHIPPED | OUTPATIENT
Start: 2023-06-19 | End: 2023-10-17

## 2023-06-19 ASSESSMENT — ENCOUNTER SYMPTOMS
RESPIRATORY NEGATIVE: 1
EYES NEGATIVE: 1
ALLERGIC/IMMUNOLOGIC NEGATIVE: 1
GASTROINTESTINAL NEGATIVE: 1

## 2023-06-19 NOTE — PROGRESS NOTES
DAILY, Disp: , Rfl:     montelukast (SINGULAIR) 10 MG tablet, Take 1 tablet by mouth nightly, Disp: , Rfl:     albuterol (PROVENTIL) (2.5 MG/3ML) 0.083% nebulizer solution, Take 3 mLs by nebulization every 6 hours as needed, Disp: , Rfl:     gabapentin (NEURONTIN) 100 MG capsule, Take 1 capsule by mouth nightly for 30 days. Intended supply: 30 days, Disp: 30 capsule, Rfl: 0    Allergies   Allergen Reactions    Ciprofloxacin     Levofloxacin Other (See Comments)     pain    Penicillins Hives       Social History     Tobacco Use    Smoking status: Former     Packs/day: 2.00     Years: 17.00     Pack years: 34.00     Types: Cigarettes     Start date: 18     Quit date: 1999     Years since quittin.5    Smokeless tobacco: Never   Vaping Use    Vaping Use: Never used   Substance Use Topics    Alcohol use: No    Drug use: Not Currently      Past Surgical History:   Procedure Laterality Date    APPENDECTOMY      COLONOSCOPY N/A 4/15/2021    COLONOSCOPY WITH BIOPSY performed by Gopal Trinidad DO at [facility] [address] ENDOSCOPY N/A 4/15/2021    EGD ESOPHAGOGASTRODUODENOSCOPY DILATATION performed by Gopal Trinidad DO at [facility] ENDOSCOPY     Family History   Problem Relation Age of Onset    Cancer Mother     Diabetes Mother      Past Medical History:   Diagnosis Date    Asthma     Diabetes mellitus ([facility] [address]     Hypertension     Prolonged emergence from general anesthesia        Vitals:    23 0935   BP: 128/76   Pulse: 80   Temp: 98.2 °F (36.8 °C)   SpO2: 98%   Weight: (!) 419 lb (190.1 kg)   Height: 6' 1\" (1.854 m)     BP Readings from Last 3 Encounters:   23 128/76   23 130/80   23 132/78        Physical Exam  Vitals and nursing note reviewed. Constitutional:       Appearance: He is well-developed. HENT:      Head: Normocephalic.       Right Ear: External ear normal.      Left Ear: External ear normal.      Nose: Nose

## 2023-06-20 LAB — ANA SER QL: NEGATIVE

## 2023-06-28 ENCOUNTER — OFFICE VISIT (OUTPATIENT)
Dept: FAMILY MEDICINE CLINIC | Age: 58
End: 2023-06-28
Payer: COMMERCIAL

## 2023-06-28 VITALS
TEMPERATURE: 97.6 F | DIASTOLIC BLOOD PRESSURE: 78 MMHG | SYSTOLIC BLOOD PRESSURE: 120 MMHG | HEART RATE: 88 BPM | OXYGEN SATURATION: 96 % | BODY MASS INDEX: 55.68 KG/M2 | WEIGHT: 315 LBS

## 2023-06-28 DIAGNOSIS — J01.90 ACUTE NON-RECURRENT SINUSITIS, UNSPECIFIED LOCATION: Primary | ICD-10-CM

## 2023-06-28 DIAGNOSIS — R09.82 POSTNASAL DRIP: ICD-10-CM

## 2023-06-28 DIAGNOSIS — H10.9 CONJUNCTIVITIS OF RIGHT EYE, UNSPECIFIED CONJUNCTIVITIS TYPE: ICD-10-CM

## 2023-06-28 DIAGNOSIS — R09.81 NASAL CONGESTION: ICD-10-CM

## 2023-06-28 PROCEDURE — 3017F COLORECTAL CA SCREEN DOC REV: CPT | Performed by: PHYSICIAN ASSISTANT

## 2023-06-28 PROCEDURE — G8417 CALC BMI ABV UP PARAM F/U: HCPCS | Performed by: PHYSICIAN ASSISTANT

## 2023-06-28 PROCEDURE — 1036F TOBACCO NON-USER: CPT | Performed by: PHYSICIAN ASSISTANT

## 2023-06-28 PROCEDURE — 99213 OFFICE O/P EST LOW 20 MIN: CPT | Performed by: PHYSICIAN ASSISTANT

## 2023-06-28 PROCEDURE — G8427 DOCREV CUR MEDS BY ELIG CLIN: HCPCS | Performed by: PHYSICIAN ASSISTANT

## 2023-06-28 RX ORDER — PREDNISONE 10 MG/1
TABLET ORAL
Qty: 18 TABLET | Refills: 0 | Status: SHIPPED | OUTPATIENT
Start: 2023-06-28

## 2023-06-28 RX ORDER — CLINDAMYCIN HYDROCHLORIDE 300 MG/1
300 CAPSULE ORAL 3 TIMES DAILY
Qty: 30 CAPSULE | Refills: 0 | Status: SHIPPED | OUTPATIENT
Start: 2023-06-28 | End: 2023-07-08

## 2023-06-28 RX ORDER — TOBRAMYCIN 3 MG/ML
1 SOLUTION/ DROPS OPHTHALMIC EVERY 4 HOURS
Qty: 5 ML | Refills: 0 | Status: SHIPPED | OUTPATIENT
Start: 2023-06-28 | End: 2023-07-08

## 2023-07-05 ENCOUNTER — OFFICE VISIT (OUTPATIENT)
Dept: PRIMARY CARE CLINIC | Age: 58
End: 2023-07-05
Payer: COMMERCIAL

## 2023-07-05 VITALS
SYSTOLIC BLOOD PRESSURE: 126 MMHG | HEART RATE: 84 BPM | DIASTOLIC BLOOD PRESSURE: 80 MMHG | OXYGEN SATURATION: 97 % | BODY MASS INDEX: 41.75 KG/M2 | TEMPERATURE: 97.4 F | WEIGHT: 315 LBS | HEIGHT: 73 IN

## 2023-07-05 DIAGNOSIS — R06.02 SHORTNESS OF BREATH: ICD-10-CM

## 2023-07-05 DIAGNOSIS — E11.9 TYPE 2 DIABETES MELLITUS WITHOUT COMPLICATION, WITHOUT LONG-TERM CURRENT USE OF INSULIN (HCC): ICD-10-CM

## 2023-07-05 DIAGNOSIS — I15.9 SECONDARY HYPERTENSION: Primary | ICD-10-CM

## 2023-07-05 PROCEDURE — G8417 CALC BMI ABV UP PARAM F/U: HCPCS | Performed by: FAMILY MEDICINE

## 2023-07-05 PROCEDURE — 99214 OFFICE O/P EST MOD 30 MIN: CPT | Performed by: FAMILY MEDICINE

## 2023-07-05 PROCEDURE — 2022F DILAT RTA XM EVC RTNOPTHY: CPT | Performed by: FAMILY MEDICINE

## 2023-07-05 PROCEDURE — 1036F TOBACCO NON-USER: CPT | Performed by: FAMILY MEDICINE

## 2023-07-05 PROCEDURE — 3044F HG A1C LEVEL LT 7.0%: CPT | Performed by: FAMILY MEDICINE

## 2023-07-05 PROCEDURE — 93000 ELECTROCARDIOGRAM COMPLETE: CPT | Performed by: FAMILY MEDICINE

## 2023-07-05 PROCEDURE — 3017F COLORECTAL CA SCREEN DOC REV: CPT | Performed by: FAMILY MEDICINE

## 2023-07-05 PROCEDURE — G8427 DOCREV CUR MEDS BY ELIG CLIN: HCPCS | Performed by: FAMILY MEDICINE

## 2023-07-05 ASSESSMENT — ENCOUNTER SYMPTOMS
EYES NEGATIVE: 1
SHORTNESS OF BREATH: 1
GASTROINTESTINAL NEGATIVE: 1
ALLERGIC/IMMUNOLOGIC NEGATIVE: 1

## 2023-07-05 NOTE — PROGRESS NOTES
once. GEL, Disp: , Rfl:     ciclopirox (LOPROX) 0.77 % cream, Apply topically 2 times daily Apply topically 2 times daily. , Disp: , Rfl:     fexofenadine (ALLEGRA) 180 MG tablet, Take 1 tablet by mouth daily, Disp: , Rfl:     albuterol sulfate HFA (PROVENTIL;VENTOLIN;PROAIR) 108 (90 Base) MCG/ACT inhaler, Inhale 2 puffs into the lungs 4 times daily as needed for Wheezing, Disp: 18 g, Rfl: 0    fluticasone (FLONASE) 50 MCG/ACT nasal spray, 2 sprays by Each Nostril route daily, Disp: 48 g, Rfl: 1    pantoprazole (PROTONIX) 20 MG tablet, TAKE 1 TABLET BY MOUTH ONCE DAILY, Disp: , Rfl:     montelukast (SINGULAIR) 10 MG tablet, Take 1 tablet by mouth nightly, Disp: , Rfl:     albuterol (PROVENTIL) (2.5 MG/3ML) 0.083% nebulizer solution, Take 3 mLs by nebulization every 6 hours as needed, Disp: , Rfl:     Allergies   Allergen Reactions    Ciprofloxacin     Levofloxacin Other (See Comments)     pain    Penicillins Hives       Social History     Tobacco Use    Smoking status: Former     Packs/day: 2.00     Years: 17.00     Pack years: 34.00     Types: Cigarettes     Start date:      Quit date: 1999     Years since quittin.6    Smokeless tobacco: Never   Vaping Use    Vaping Use: Never used   Substance Use Topics    Alcohol use: No    Drug use: Not Currently      Past Surgical History:   Procedure Laterality Date    APPENDECTOMY      COLONOSCOPY N/A 4/15/2021    COLONOSCOPY WITH BIOPSY performed by Effie Michael DO at [address] [facility] [facility] GASTROINTESTINAL ENDOSCOPY N/A 4/15/2021    EGD ESOPHAGOGASTRODUODENOSCOPY DILATATION performed by Effie Michael DO at [facility] ENDOSCOPY     Family History   Problem Relation Age of Onset    Cancer Mother     Diabetes Mother      Past Medical History:   Diagnosis Date    Asthma     Diabetes mellitus ([address] )     Hypertension     Prolonged emergence from general anesthesia        Vitals:    23 1420   BP: 126/80

## 2023-07-06 ENCOUNTER — TELEPHONE (OUTPATIENT)
Dept: ADMINISTRATIVE | Age: 58
End: 2023-07-06

## 2023-07-07 ENCOUNTER — OFFICE VISIT (OUTPATIENT)
Dept: PODIATRY | Age: 58
End: 2023-07-07

## 2023-07-07 DIAGNOSIS — L84 CORNS AND CALLOSITIES: ICD-10-CM

## 2023-07-07 DIAGNOSIS — E11.9 TYPE 2 DIABETES MELLITUS WITHOUT COMPLICATION, UNSPECIFIED WHETHER LONG TERM INSULIN USE (HCC): Primary | ICD-10-CM

## 2023-07-07 DIAGNOSIS — G60.8 HEREDITARY SENSORY NEUROPATHY: ICD-10-CM

## 2023-07-07 DIAGNOSIS — M20.42 HAMMER TOES OF BOTH FEET: ICD-10-CM

## 2023-07-07 DIAGNOSIS — M20.41 HAMMER TOES OF BOTH FEET: ICD-10-CM

## 2023-07-07 DIAGNOSIS — B35.1 TINEA UNGUIUM: ICD-10-CM

## 2023-07-10 PROBLEM — E66.01 MORBID OBESITY (HCC): Status: ACTIVE | Noted: 2022-01-13

## 2023-07-10 PROBLEM — R20.2 NUMBNESS AND TINGLING OF BOTH LOWER EXTREMITIES: Status: RESOLVED | Noted: 2023-02-28 | Resolved: 2023-07-10

## 2023-07-10 PROBLEM — R05.1 ACUTE COUGH: Status: RESOLVED | Noted: 2023-01-09 | Resolved: 2023-07-10

## 2023-07-10 PROBLEM — R20.0 NUMBNESS AND TINGLING OF BOTH LOWER EXTREMITIES: Status: RESOLVED | Noted: 2023-02-28 | Resolved: 2023-07-10

## 2023-07-11 ENCOUNTER — OFFICE VISIT (OUTPATIENT)
Dept: CARDIOLOGY CLINIC | Age: 58
End: 2023-07-11
Payer: COMMERCIAL

## 2023-07-11 VITALS
RESPIRATION RATE: 16 BRPM | HEIGHT: 73 IN | BODY MASS INDEX: 41.75 KG/M2 | SYSTOLIC BLOOD PRESSURE: 153 MMHG | HEART RATE: 99 BPM | DIASTOLIC BLOOD PRESSURE: 85 MMHG | WEIGHT: 315 LBS

## 2023-07-11 DIAGNOSIS — R06.02 SHORTNESS OF BREATH: Primary | ICD-10-CM

## 2023-07-11 DIAGNOSIS — E66.01 MORBID OBESITY (HCC): ICD-10-CM

## 2023-07-11 PROCEDURE — G8427 DOCREV CUR MEDS BY ELIG CLIN: HCPCS | Performed by: INTERNAL MEDICINE

## 2023-07-11 PROCEDURE — G8417 CALC BMI ABV UP PARAM F/U: HCPCS | Performed by: INTERNAL MEDICINE

## 2023-07-11 PROCEDURE — 99244 OFF/OP CNSLTJ NEW/EST MOD 40: CPT | Performed by: INTERNAL MEDICINE

## 2023-07-11 PROCEDURE — 93000 ELECTROCARDIOGRAM COMPLETE: CPT | Performed by: INTERNAL MEDICINE

## 2023-07-11 NOTE — PROGRESS NOTES
Chief Complaint   Patient presents with    Shortness of Breath       Patient Active Problem List    Diagnosis Date Noted    Hypotestosteronemia in male 02/28/2023     Priority: Medium    Gastroesophageal reflux disease 12/19/2022     Priority: Medium    Shortness of breath 07/05/2023    Bilateral carpal tunnel syndrome 05/16/2023    Polyneuropathy 05/16/2023    Asymptomatic microscopic hematuria 05/16/2023    Morbid obesity ([address]) 01/13/2022    Chronic sinusitis 01/13/2022    Type 2 diabetes mellitus without complication ([address]) 52/53/5504    Secondary hypertension 02/01/2018       Current Outpatient Medications   Medication Sig Dispense Refill    pioglitazone (ACTOS) 30 MG tablet Take 1/2 (one-half) tablet by mouth once daily 45 tablet 0    lisinopril (PRINIVIL;ZESTRIL) 40 MG tablet 1 qd 90 tablet 0    gabapentin (NEURONTIN) 100 MG capsule Take 1 capsule by mouth nightly for 120 days. Intended supply: 30 days 30 capsule 3    ASPIRIN 81 PO Take by mouth      ciclopirox (LOPROX) 0.77 % cream Apply topically 2 times daily Apply topically 2 times daily. fexofenadine (ALLEGRA) 180 MG tablet Take 1 tablet by mouth daily      albuterol sulfate HFA (PROVENTIL;VENTOLIN;PROAIR) 108 (90 Base) MCG/ACT inhaler Inhale 2 puffs into the lungs 4 times daily as needed for Wheezing 18 g 0    fluticasone (FLONASE) 50 MCG/ACT nasal spray 2 sprays by Each Nostril route daily 48 g 1    montelukast (SINGULAIR) 10 MG tablet Take 1 tablet by mouth nightly      albuterol (PROVENTIL) (2.5 MG/3ML) 0.083% nebulizer solution Take 3 mLs by nebulization every 6 hours as needed       No current facility-administered medications for this visit.         Allergies   Allergen Reactions    Ciprofloxacin     Levofloxacin Other (See Comments)     pain    Penicillins Hives       Vitals:    07/11/23 1154   BP: (!) 153/85   Pulse: 99   Resp: 16   Weight: (!) 420 lb 3.2 oz (190.6 kg)   Height: 6' 1\" (1.854 m)                 SUBJECTIVE: Zaneeld Pilling presents

## 2023-07-12 ENCOUNTER — TELEPHONE (OUTPATIENT)
Dept: CARDIOLOGY | Age: 58
End: 2023-07-12

## 2023-07-12 NOTE — TELEPHONE ENCOUNTER
Spoke with patient and confirmed Regular  stress test appointment on 7/14/2023 at [address] [place] [address]. Instructions for test,given, and COVID-19 preprocedure information reviewed with patient, questions answered. Patient verbalized understanding.

## 2023-07-14 ENCOUNTER — HOSPITAL ENCOUNTER (OUTPATIENT)
Dept: CARDIOLOGY | Age: 58
Discharge: HOME | End: 2023-07-14
Payer: COMMERCIAL

## 2023-07-14 VITALS
SYSTOLIC BLOOD PRESSURE: 120 MMHG | HEIGHT: 73 IN | BODY MASS INDEX: 41.75 KG/M2 | RESPIRATION RATE: 16 BRPM | DIASTOLIC BLOOD PRESSURE: 76 MMHG | HEART RATE: 84 BPM | OXYGEN SATURATION: 98 % | WEIGHT: 315 LBS

## 2023-07-14 DIAGNOSIS — R06.02 SHORTNESS OF BREATH: ICD-10-CM

## 2023-07-14 DIAGNOSIS — E66.01 MORBID OBESITY (HCC): ICD-10-CM

## 2023-07-14 PROCEDURE — 93017 CV STRESS TEST TRACING ONLY: CPT

## 2023-07-14 RX ORDER — AZELASTINE HCL 205.5 UG/1
1 SPRAY NASAL 2 TIMES DAILY
COMMUNITY
Start: 2022-03-01

## 2023-07-14 NOTE — PROCEDURES
[address] and V[facility] Lab - [facility], [address]  [phone]    Exercise Stress Study (non-nuclear)      Name: [address] [place] [address] Account Number:  [de-identified]      :  1965          Sex: male         Date of Study:  2023    Height: 6' 1\" (185.4 cm)          Weight - Scale: (!) 420 lb (190.5 kg)    Ordering Provider: Radha Pittman MD          PCP: Amelie Fair DO    Cardiologist: Radha Pittman MD              Interpreting Physician: Aleta Ni MD    Indication:   Detecting the presence and location of coronary artery disease    Clinical History:   Patient has no known history of coronary artery disease. Resting ECG:    The resting EKG reveals sinus rhythm 77 bpm    Exercise: The patient exercised using a Geovani protocol, completing 5:00 minutes and reaching an estimated work load of 8.27 metabolic equivalents (METS). Resting HR was 77. Peak exercise heart rate was 144 ( 88% of maximum predicted heart rate for age). Baseline /76. Peak exercise /70. The blood pressure response to exercise was normal      Exercise was terminated due to dyspnea. The patient experienced no chest pain with exercise. Pulse oximetry was used to monitor oxygen saturation during the stress test.  The study was performed on Room Air. The resting pulse oximeter was 98%. The lowest O2 saturation seen during exercise was 98 %. The average O2 saturation with exercise was 98 %. Exercise ECG:   The patient demonstrated  rare PAC, one PVC  during exercise. With exercise, there were no ST segment changes of significance at the heart rate achieved. Trejo treadmill score was 5 implying low risk. Impression:    Exercise EKG was negative. The patient experienced no chest pain with exercise. Trejo treadmill score was 5 implying low risk. Exercise capacity was average.     Low risk general exercise treadmill

## 2023-07-17 ENCOUNTER — TELEPHONE (OUTPATIENT)
Dept: CARDIOLOGY CLINIC | Age: 58
End: 2023-07-17

## 2023-07-17 NOTE — TELEPHONE ENCOUNTER
----- Message from Adonis Rosenberg MD sent at 7/17/2023  7:15 AM EDT -----  Stress normal  Ov rpn      ----- Message -----  From: Jose Jackson MD  Sent: 7/14/2023   4:19 PM EDT  To: Adonis Rosenberg MD

## 2023-07-18 ENCOUNTER — TELEPHONE (OUTPATIENT)
Dept: ENT CLINIC | Age: 58
End: 2023-07-18

## 2023-07-18 ENCOUNTER — OFFICE VISIT (OUTPATIENT)
Dept: ENT CLINIC | Age: 58
End: 2023-07-18
Payer: COMMERCIAL

## 2023-07-18 VITALS
SYSTOLIC BLOOD PRESSURE: 121 MMHG | HEIGHT: 73 IN | DIASTOLIC BLOOD PRESSURE: 76 MMHG | WEIGHT: 315 LBS | OXYGEN SATURATION: 98 % | HEART RATE: 81 BPM | BODY MASS INDEX: 41.75 KG/M2

## 2023-07-18 DIAGNOSIS — R09.82 POST-NASAL DRAINAGE: ICD-10-CM

## 2023-07-18 DIAGNOSIS — J32.4 CHRONIC PANSINUSITIS: Primary | ICD-10-CM

## 2023-07-18 DIAGNOSIS — J30.9 ALLERGIC RHINITIS, UNSPECIFIED SEASONALITY, UNSPECIFIED TRIGGER: ICD-10-CM

## 2023-07-18 PROCEDURE — 99213 OFFICE O/P EST LOW 20 MIN: CPT | Performed by: OTOLARYNGOLOGY

## 2023-07-18 PROCEDURE — G8427 DOCREV CUR MEDS BY ELIG CLIN: HCPCS | Performed by: OTOLARYNGOLOGY

## 2023-07-18 PROCEDURE — G8417 CALC BMI ABV UP PARAM F/U: HCPCS | Performed by: OTOLARYNGOLOGY

## 2023-07-18 PROCEDURE — 3017F COLORECTAL CA SCREEN DOC REV: CPT | Performed by: OTOLARYNGOLOGY

## 2023-07-18 PROCEDURE — 1036F TOBACCO NON-USER: CPT | Performed by: OTOLARYNGOLOGY

## 2023-07-18 ASSESSMENT — ENCOUNTER SYMPTOMS
SINUS PRESSURE: 1
RHINORRHEA: 1
COLOR CHANGE: 0
ABDOMINAL PAIN: 0
RESPIRATORY NEGATIVE: 1
EYES NEGATIVE: 1
SHORTNESS OF BREATH: 0
GASTROINTESTINAL NEGATIVE: 1

## 2023-07-18 NOTE — PROGRESS NOTES
nasal septum. This occurs where there are two opposing holes in the nasal septal flaps. A hole in the nasal septum may result in disturbing crusting, bleeding and whistling.   -- Saddle Nose: If too much supporting cartilage is removed, the mid-portion of the nose may sag creating a \"Saddle Nose\" deformity.             Follow up 1 week after surgery  RX given today:  none

## 2023-07-18 NOTE — TELEPHONE ENCOUNTER
[facility] to authorize order with patient insurance. Patient is scheduled for CT sinus without contrast with radiology on 10/02/23 @ 12pm. Patient has been notified of date and time and that they need to arrive at 11:30am. Patient was informed He has no prep prior to procedure. Patient instructed to park in [facility] parking lot and report to registration. Patient verbalized understanding.   Electronically signed by Kimi Anrdews LPN on 9/20/0028 at 5:41 PM

## 2023-08-04 ENCOUNTER — OFFICE VISIT (OUTPATIENT)
Dept: PRIMARY CARE CLINIC | Age: 58
End: 2023-08-04
Payer: COMMERCIAL

## 2023-08-04 VITALS
OXYGEN SATURATION: 97 % | SYSTOLIC BLOOD PRESSURE: 124 MMHG | HEIGHT: 73 IN | WEIGHT: 315 LBS | BODY MASS INDEX: 41.75 KG/M2 | DIASTOLIC BLOOD PRESSURE: 80 MMHG | HEART RATE: 67 BPM | TEMPERATURE: 97.7 F

## 2023-08-04 DIAGNOSIS — E66.01 MORBID OBESITY (HCC): ICD-10-CM

## 2023-08-04 DIAGNOSIS — E11.9 TYPE 2 DIABETES MELLITUS WITHOUT COMPLICATION, WITHOUT LONG-TERM CURRENT USE OF INSULIN (HCC): ICD-10-CM

## 2023-08-04 DIAGNOSIS — J45.41 MODERATE PERSISTENT REACTIVE AIRWAY DISEASE WITH ACUTE EXACERBATION: ICD-10-CM

## 2023-08-04 DIAGNOSIS — I15.9 SECONDARY HYPERTENSION: ICD-10-CM

## 2023-08-04 DIAGNOSIS — R06.02 SHORTNESS OF BREATH: Primary | ICD-10-CM

## 2023-08-04 PROBLEM — J45.909 REACTIVE AIRWAY DISEASE: Status: ACTIVE | Noted: 2023-08-04

## 2023-08-04 PROCEDURE — G8417 CALC BMI ABV UP PARAM F/U: HCPCS | Performed by: FAMILY MEDICINE

## 2023-08-04 PROCEDURE — 3044F HG A1C LEVEL LT 7.0%: CPT | Performed by: FAMILY MEDICINE

## 2023-08-04 PROCEDURE — 3017F COLORECTAL CA SCREEN DOC REV: CPT | Performed by: FAMILY MEDICINE

## 2023-08-04 PROCEDURE — 99214 OFFICE O/P EST MOD 30 MIN: CPT | Performed by: FAMILY MEDICINE

## 2023-08-04 PROCEDURE — G8427 DOCREV CUR MEDS BY ELIG CLIN: HCPCS | Performed by: FAMILY MEDICINE

## 2023-08-04 PROCEDURE — 2022F DILAT RTA XM EVC RTNOPTHY: CPT | Performed by: FAMILY MEDICINE

## 2023-08-04 PROCEDURE — 1036F TOBACCO NON-USER: CPT | Performed by: FAMILY MEDICINE

## 2023-08-04 RX ORDER — FLUTICASONE PROPIONATE AND SALMETEROL 250; 50 UG/1; UG/1
1 POWDER RESPIRATORY (INHALATION) EVERY 12 HOURS
Qty: 60 EACH | Refills: 3 | Status: SHIPPED | OUTPATIENT
Start: 2023-08-04

## 2023-08-04 NOTE — PROGRESS NOTES
hypertension diabetes reactive airway disease with associated shortness of breath. Cardiac evaluation recently was stable. All meds reviewed with him we will maintain as prescribed. Pulmonary consultation recommended and referred today. The addition of Advair twice a day and continue albuterol as needed. Review of Systems   Constitutional: Negative. HENT: Negative. Eyes: Negative. Respiratory:  Positive for shortness of breath. Gastrointestinal: Negative. Endocrine: Negative. Genitourinary: Negative. Musculoskeletal: Negative. Skin: Negative. Allergic/Immunologic: Negative. Neurological: Negative. Hematological: Negative. Psychiatric/Behavioral: Negative. Today systems review overall stable. Some reactive airway complaints and we will plan further pulmonary consultation. Cardiac evaluation was stable. Current Outpatient Medications:     fluticasone-salmeterol (ADVAIR DISKUS) 250-50 MCG/ACT AEPB diskus inhaler, Inhale 1 puff into the lungs in the morning and 1 puff in the evening., Disp: 60 each, Rfl: 3    Azelastine HCl (ASTEPRO NA), by Nasal route, Disp: , Rfl:     pioglitazone (ACTOS) 30 MG tablet, Take 1/2 (one-half) tablet by mouth once daily, Disp: 45 tablet, Rfl: 0    lisinopril (PRINIVIL;ZESTRIL) 40 MG tablet, 1 qd, Disp: 90 tablet, Rfl: 0    gabapentin (NEURONTIN) 100 MG capsule, Take 1 capsule by mouth nightly for 120 days. Intended supply: 30 days, Disp: 30 capsule, Rfl: 3    ASPIRIN 81 PO, Take by mouth daily, Disp: , Rfl:     ciclopirox (LOPROX) 0.77 % cream, Apply topically 2 times daily Apply topically 2 times daily. , Disp: , Rfl:     fexofenadine (ALLEGRA) 180 MG tablet, Take 1 tablet by mouth daily, Disp: , Rfl:     albuterol sulfate HFA (PROVENTIL;VENTOLIN;PROAIR) 108 (90 Base) MCG/ACT inhaler, Inhale 2 puffs into the lungs 4 times daily as needed for Wheezing, Disp: 18 g, Rfl: 0    fluticasone (FLONASE) 50 MCG/ACT nasal spray, 2 sprays

## 2023-08-21 ENCOUNTER — PREP FOR PROCEDURE (OUTPATIENT)
Dept: ENT CLINIC | Age: 58
End: 2023-08-21

## 2023-08-21 ENCOUNTER — TELEPHONE (OUTPATIENT)
Dept: ENT CLINIC | Age: 58
End: 2023-08-21

## 2023-08-21 DIAGNOSIS — J32.0 CHRONIC MAXILLARY SINUSITIS: ICD-10-CM

## 2023-08-21 NOTE — TELEPHONE ENCOUNTER
Prior Authorization Form:      DEMOGRAPHICS:                     Patient Name:  Shantanu Olivas  Patient :  1965            Insurance:  Payor: Kristy Bruno MARITA / Plan: Kristy Bruno MARITA / Product Type: *No Product type* /   Insurance ID Number:    Payer/Plan Subscr  Sex Relation Sub. Ins. ID Effective Group Num   1.  Laron FABIAN 1965 Male Self [id number]                                    [address] [id number]         DIAGNOSIS & PROCEDURE:                       Procedure/Operation: FESS,Septoplasty SMRITS           CPT Code: [id number]    Diagnosis:  chronic sinusitis    ICD10 Code: j32.0    Location:  [facility]    Surgeon:  seble    SCHEDULING INFORMATION:                          Date: 10/30/23    Time: n/a              Anesthesia:  General                                                       Status:  Outpatient        Special Comments:  include all chart notes       Electronically signed by Gerhardt Richter, MA on 2023 at 10:14 AM

## 2023-09-01 ENCOUNTER — HOSPITAL ENCOUNTER (OUTPATIENT)
Dept: CT IMAGING | Age: 58
Discharge: HOME | End: 2023-09-01
Payer: COMMERCIAL

## 2023-09-01 ENCOUNTER — HOSPITAL ENCOUNTER (OUTPATIENT)
Dept: PULMONOLOGY | Age: 58
Discharge: HOME | End: 2023-09-01
Payer: COMMERCIAL

## 2023-09-01 DIAGNOSIS — R06.02 SHORTNESS OF BREATH: ICD-10-CM

## 2023-09-01 DIAGNOSIS — J44.9 CHRONIC OBSTRUCTIVE PULMONARY DISEASE, UNSPECIFIED COPD TYPE (HCC): ICD-10-CM

## 2023-09-01 PROCEDURE — 94726 PLETHYSMOGRAPHY LUNG VOLUMES: CPT

## 2023-09-01 PROCEDURE — 94729 DIFFUSING CAPACITY: CPT

## 2023-09-01 PROCEDURE — 94060 EVALUATION OF WHEEZING: CPT

## 2023-09-01 PROCEDURE — 71250 CT THORAX DX C-: CPT

## 2023-09-02 NOTE — PROCEDURES
[address], [address] [place], [place] [address]s [facility]                               PULMONARY FUNCTION    PATIENT NAME: Ruben Harper                        :        1965  MED REC NO:   [id number]                            ROOM:  ACCOUNT NO:   [de-identified]                           ADMIT DATE: 2023  PROVIDER:     Kayla Cook MD    DATE OF PROCEDURE:  2023    INDICATION:  A 51-year-old male, 72 inches height, 425 pounds. FINDINGS:  FVC is 3.76, 74% predicted prebronchodilator and 3.64, 71%  predicted postbronchodilator. FEV1 is 2.95, 75% predicted  prebronchodilator and 2.89, 74% predicted postbronchodilator. Ratio is  0.79, 101% predicted prebronchodilator and postbronchodilator. MMF98-48% is 2.95, 90% predicted prebronchodilator and 2.46, 75%  predicted postbronchodilator. MVV is 106.4, 71% predicted. SVC 3.47,  68% predicted prebronchodilator and 3.71, 73% predicted  postbronchodilator. RV 2.02, 87% predicted prebronchodilator and 2, 86%  predicted postbronchodilator. TLC is 5.49, 74% predicted  prebronchodilator and 5.71, 77% predicted postbronchodilator. Ratio is  37, 115% predicted prebronchodilator and 35, 109% predicted  postbronchodilator. DLCO is 27.54, 92% predicted and ratio 5.6, 132%  predicted. IMPRESSION:  Mild restrictive lung disease. Please correlate  clinically.         Kayla Cook MD    D: 2023 14:55:14       T: 2023 18:51:10     [email or web address]  Job#: [id number]     Doc#: [id number]    CC:

## 2023-09-08 ENCOUNTER — PROCEDURE VISIT (OUTPATIENT)
Dept: PODIATRY | Age: 58
End: 2023-09-08
Payer: COMMERCIAL

## 2023-09-08 DIAGNOSIS — L84 CORNS AND CALLOSITIES: ICD-10-CM

## 2023-09-08 DIAGNOSIS — M20.41 HAMMER TOES OF BOTH FEET: ICD-10-CM

## 2023-09-08 DIAGNOSIS — B35.1 TINEA UNGUIUM: ICD-10-CM

## 2023-09-08 DIAGNOSIS — M20.42 HAMMER TOES OF BOTH FEET: ICD-10-CM

## 2023-09-08 DIAGNOSIS — G60.8 HEREDITARY SENSORY NEUROPATHY: ICD-10-CM

## 2023-09-08 DIAGNOSIS — E11.9 TYPE 2 DIABETES MELLITUS WITHOUT COMPLICATION, UNSPECIFIED WHETHER LONG TERM INSULIN USE (HCC): Primary | ICD-10-CM

## 2023-09-08 PROCEDURE — 11721 DEBRIDE NAIL 6 OR MORE: CPT | Performed by: PODIATRIST

## 2023-09-08 PROCEDURE — 11056 PARNG/CUTG B9 HYPRKR LES 2-4: CPT | Performed by: PODIATRIST

## 2023-09-08 NOTE — PROGRESS NOTES
, Rfl: Allergies   Allergen Reactions    Ciprofloxacin Swelling    Levofloxacin Other (See Comments)     pain    Penicillins Hives    Doxycycline Other (See Comments)     Vision problems       Past Medical History:   Diagnosis Date    Asthma     Diabetes mellitus ([address])     Hypertension     Prolonged emergence from general anesthesia            There were no vitals filed for this visit. Work History/Social History: Foot and ankle history:     Focused Lower Extremity Physical Exam:    Neurovascular examination:    Dorsalis Pedis palpable bilateral.  Posterior tibialis palpable bilateral.    Capillary Refill Time:  Immediate return  Hair growth:  Symmetrical and bilateral   Skin:  Not atrophic  Edema: Mild edema bilateral feet or ankles. Neurologic:  Light touch diminished bilateral.      Musculoskeletal/ Orthopedic examination:    Equinis: Absent bilateral  Dorsiflexion, plantarflexion, inversion, eversion bilateral 5 out of 5 muscle strength  Wiggling toes  Negative Homans  Flexible hammertoes 2 through 5 bilateral  No pain foot or ankle bilateral    Dermatology examination:    Toenails 1-5 right and left thickened, elongated, dystrophic, mycotic with subungual debris. Webspaces 1-4 bilateral clean, dry and intact. Hyperkeratotic tissue fifth metatarsal bilateral.  No open wounds. Assessment and Plan:  Leim Saint was seen today for callouses, nail problem and diabetes. Diagnoses and all orders for this visit:    Type 2 diabetes mellitus without complication, unspecified whether long term insulin use (HCC)    Tinea unguium    Corns and callosities    Hammer toes of both feet    Hereditary sensory neuropathy          No acute osseous abnormality. Dorsal and plantar calcaneal spurs. Follow-up diabetic foot ankle exam  History gabapentin  Manual debridement with standard technique toenails 1 through 5 right and left in thickness and length. Patient tolerated procedure well.   Norah

## 2023-09-11 ENCOUNTER — OFFICE VISIT (OUTPATIENT)
Dept: PRIMARY CARE CLINIC | Age: 58
End: 2023-09-11
Payer: COMMERCIAL

## 2023-09-11 VITALS
BODY MASS INDEX: 41.75 KG/M2 | WEIGHT: 315 LBS | TEMPERATURE: 98.1 F | HEART RATE: 82 BPM | DIASTOLIC BLOOD PRESSURE: 70 MMHG | SYSTOLIC BLOOD PRESSURE: 134 MMHG | OXYGEN SATURATION: 97 % | HEIGHT: 73 IN

## 2023-09-11 DIAGNOSIS — M25.561 ACUTE PAIN OF RIGHT KNEE: ICD-10-CM

## 2023-09-11 DIAGNOSIS — J45.41 MODERATE PERSISTENT REACTIVE AIRWAY DISEASE WITH ACUTE EXACERBATION: Primary | ICD-10-CM

## 2023-09-11 DIAGNOSIS — K21.9 GASTROESOPHAGEAL REFLUX DISEASE, UNSPECIFIED WHETHER ESOPHAGITIS PRESENT: ICD-10-CM

## 2023-09-11 DIAGNOSIS — E11.9 TYPE 2 DIABETES MELLITUS WITHOUT COMPLICATION, WITHOUT LONG-TERM CURRENT USE OF INSULIN (HCC): ICD-10-CM

## 2023-09-11 DIAGNOSIS — I15.9 SECONDARY HYPERTENSION: ICD-10-CM

## 2023-09-11 PROCEDURE — 2022F DILAT RTA XM EVC RTNOPTHY: CPT | Performed by: FAMILY MEDICINE

## 2023-09-11 PROCEDURE — G8427 DOCREV CUR MEDS BY ELIG CLIN: HCPCS | Performed by: FAMILY MEDICINE

## 2023-09-11 PROCEDURE — 3017F COLORECTAL CA SCREEN DOC REV: CPT | Performed by: FAMILY MEDICINE

## 2023-09-11 PROCEDURE — G8417 CALC BMI ABV UP PARAM F/U: HCPCS | Performed by: FAMILY MEDICINE

## 2023-09-11 PROCEDURE — 3044F HG A1C LEVEL LT 7.0%: CPT | Performed by: FAMILY MEDICINE

## 2023-09-11 PROCEDURE — 1036F TOBACCO NON-USER: CPT | Performed by: FAMILY MEDICINE

## 2023-09-11 PROCEDURE — 99214 OFFICE O/P EST MOD 30 MIN: CPT | Performed by: FAMILY MEDICINE

## 2023-09-11 RX ORDER — PIOGLITAZONEHYDROCHLORIDE 30 MG/1
TABLET ORAL
Qty: 45 TABLET | Refills: 3 | Status: SHIPPED | OUTPATIENT
Start: 2023-09-11

## 2023-09-11 RX ORDER — PREDNISONE 5 MG/1
TABLET ORAL
Qty: 30 TABLET | Refills: 0 | Status: SHIPPED | OUTPATIENT
Start: 2023-09-11

## 2023-09-11 RX ORDER — GABAPENTIN 100 MG/1
100 CAPSULE ORAL NIGHTLY
Qty: 30 CAPSULE | Refills: 3 | Status: SHIPPED | OUTPATIENT
Start: 2023-09-11 | End: 2024-01-09

## 2023-09-11 RX ORDER — LISINOPRIL 40 MG/1
TABLET ORAL
Qty: 90 TABLET | Refills: 3 | Status: SHIPPED | OUTPATIENT
Start: 2023-09-11

## 2023-09-11 ASSESSMENT — ENCOUNTER SYMPTOMS
EYES NEGATIVE: 1
ALLERGIC/IMMUNOLOGIC NEGATIVE: 1
RESPIRATORY NEGATIVE: 1
GASTROINTESTINAL NEGATIVE: 1

## 2023-09-12 PROBLEM — J32.0 CHRONIC MAXILLARY SINUSITIS: Status: ACTIVE | Noted: 2023-08-21

## 2023-09-18 ENCOUNTER — OFFICE VISIT (OUTPATIENT)
Dept: PRIMARY CARE CLINIC | Age: 58
End: 2023-09-18
Payer: COMMERCIAL

## 2023-09-18 VITALS
SYSTOLIC BLOOD PRESSURE: 132 MMHG | RESPIRATION RATE: 17 BRPM | DIASTOLIC BLOOD PRESSURE: 82 MMHG | TEMPERATURE: 97.8 F | HEART RATE: 90 BPM | BODY MASS INDEX: 56.05 KG/M2 | WEIGHT: 315 LBS | OXYGEN SATURATION: 96 %

## 2023-09-18 DIAGNOSIS — K21.9 GASTROESOPHAGEAL REFLUX DISEASE, UNSPECIFIED WHETHER ESOPHAGITIS PRESENT: ICD-10-CM

## 2023-09-18 DIAGNOSIS — E11.9 TYPE 2 DIABETES MELLITUS WITHOUT COMPLICATION, WITHOUT LONG-TERM CURRENT USE OF INSULIN (HCC): ICD-10-CM

## 2023-09-18 DIAGNOSIS — I15.9 SECONDARY HYPERTENSION: ICD-10-CM

## 2023-09-18 DIAGNOSIS — M25.561 ACUTE PAIN OF RIGHT KNEE: Primary | ICD-10-CM

## 2023-09-18 PROCEDURE — 1036F TOBACCO NON-USER: CPT | Performed by: FAMILY MEDICINE

## 2023-09-18 PROCEDURE — G8417 CALC BMI ABV UP PARAM F/U: HCPCS | Performed by: FAMILY MEDICINE

## 2023-09-18 PROCEDURE — 3044F HG A1C LEVEL LT 7.0%: CPT | Performed by: FAMILY MEDICINE

## 2023-09-18 PROCEDURE — 99214 OFFICE O/P EST MOD 30 MIN: CPT | Performed by: FAMILY MEDICINE

## 2023-09-18 PROCEDURE — 3017F COLORECTAL CA SCREEN DOC REV: CPT | Performed by: FAMILY MEDICINE

## 2023-09-18 PROCEDURE — G8427 DOCREV CUR MEDS BY ELIG CLIN: HCPCS | Performed by: FAMILY MEDICINE

## 2023-09-18 PROCEDURE — 2022F DILAT RTA XM EVC RTNOPTHY: CPT | Performed by: FAMILY MEDICINE

## 2023-09-18 RX ORDER — CELECOXIB 200 MG/1
200 CAPSULE ORAL 2 TIMES DAILY
Qty: 60 CAPSULE | Refills: 0 | Status: SHIPPED | OUTPATIENT
Start: 2023-09-18

## 2023-09-18 ASSESSMENT — PATIENT HEALTH QUESTIONNAIRE - PHQ9
SUM OF ALL RESPONSES TO PHQ QUESTIONS 1-9: 0
SUM OF ALL RESPONSES TO PHQ9 QUESTIONS 1 & 2: 0
SUM OF ALL RESPONSES TO PHQ QUESTIONS 1-9: 0
2. FEELING DOWN, DEPRESSED OR HOPELESS: 0
1. LITTLE INTEREST OR PLEASURE IN DOING THINGS: 0

## 2023-09-18 ASSESSMENT — ENCOUNTER SYMPTOMS
ALLERGIC/IMMUNOLOGIC NEGATIVE: 1
GASTROINTESTINAL NEGATIVE: 1
RESPIRATORY NEGATIVE: 1
EYES NEGATIVE: 1

## 2023-09-18 NOTE — PROGRESS NOTES
23     Astrid Mabry    : 1965 Sex: male   Age: 62 y.o. Chief Complaint   Patient presents with    Follow-up     Follow up on right knee pain: patient states that prednisone didn't even touch his pain   Constant sharp, throbbing pain  Pain: 5/10        Prior to Admission medications    Medication Sig Start Date End Date Taking? Authorizing Provider   celecoxib (CELEBREX) 200 MG capsule Take 1 capsule by mouth 2 times daily 23  Yes Chika Zapata DO   gabapentin (NEURONTIN) 100 MG capsule Take 1 capsule by mouth nightly for 120 days. Intended supply: 30 days 23 Yes Chika Zapata DO   lisinopril (PRINIVIL;ZESTRIL) 40 MG tablet 1 qd 23  Yes Chika Zapata DO   pioglitazone (ACTOS) 30 MG tablet Take 1/2 (one-half) tablet by mouth once daily 23  Yes Chika Zapata DO   fluticasone-salmeterol (ADVAIR DISKUS) 250-50 MCG/ACT AEPB diskus inhaler Inhale 1 puff into the lungs in the morning and 1 puff in the evening. 23  Yes Chika Zapata DO   Azelastine HCl (ASTEPRO NA) by Nasal route   Yes Historical Provider, MD   ASPIRIN 81 PO Take by mouth daily   Yes Historical Provider, MD   ciclopirox (LOPROX) 0.77 % cream Apply topically 2 times daily Apply topically 2 times daily.    Yes Historical Provider, MD   fexofenadine (ALLEGRA) 180 MG tablet Take 1 tablet by mouth daily   Yes Historical Provider, MD   albuterol sulfate HFA (PROVENTIL;VENTOLIN;PROAIR) 108 (90 Base) MCG/ACT inhaler Inhale 2 puffs into the lungs 4 times daily as needed for Wheezing 22  Yes DONATO Quan III   fluticasone [facility]) 50 MCG/ACT nasal spray 2 sprays by Each Nostril route daily 22  Yes Virgilio Snellen, APRN - CNP   montelukast (SINGULAIR) 10 MG tablet Take 1 tablet by mouth nightly   Yes Historical Provider, MD   albuterol (PROVENTIL) (2.5 MG/3ML) 0.083% nebulizer solution Take 3 mLs by nebulization every 6 hours as needed   Yes Historical Provider, MD   azelastine HCl

## 2023-09-26 ENCOUNTER — HOSPITAL ENCOUNTER (OUTPATIENT)
Dept: CT IMAGING | Age: 58
Discharge: HOME | End: 2023-09-28
Attending: OTOLARYNGOLOGY
Payer: COMMERCIAL

## 2023-09-26 DIAGNOSIS — J32.4 CHRONIC PANSINUSITIS: ICD-10-CM

## 2023-09-26 PROCEDURE — 70486 CT MAXILLOFACIAL W/O DYE: CPT

## 2023-10-04 ENCOUNTER — TELEPHONE (OUTPATIENT)
Dept: ENT CLINIC | Age: 58
End: 2023-10-04

## 2023-10-04 DIAGNOSIS — I15.9 SECONDARY HYPERTENSION: ICD-10-CM

## 2023-10-04 DIAGNOSIS — E11.9 TYPE 2 DIABETES MELLITUS WITHOUT COMPLICATION, WITHOUT LONG-TERM CURRENT USE OF INSULIN (HCC): ICD-10-CM

## 2023-10-04 DIAGNOSIS — K21.9 GASTROESOPHAGEAL REFLUX DISEASE, UNSPECIFIED WHETHER ESOPHAGITIS PRESENT: ICD-10-CM

## 2023-10-04 DIAGNOSIS — M25.561 ACUTE PAIN OF RIGHT KNEE: ICD-10-CM

## 2023-10-04 LAB
ABSOLUTE IMMATURE GRANULOCYTE: 0.07 K/UL (ref 0–0.58)
ALBUMIN SERPL-MCNC: 4.5 G/DL (ref 3.5–5.2)
ALP BLD-CCNC: 77 U/L (ref 40–129)
ALT SERPL-CCNC: 15 U/L (ref 0–40)
ANION GAP SERPL CALCULATED.3IONS-SCNC: 18 MMOL/L (ref 7–16)
AST SERPL-CCNC: 16 U/L (ref 0–39)
BASOPHILS ABSOLUTE: 0.06 K/UL (ref 0–0.2)
BASOPHILS RELATIVE PERCENT: 1 % (ref 0–2)
BILIRUB SERPL-MCNC: 0.4 MG/DL (ref 0–1.2)
BUN BLDV-MCNC: 21 MG/DL (ref 6–20)
CALCIUM SERPL-MCNC: 9.5 MG/DL (ref 8.6–10.2)
CHLORIDE BLD-SCNC: 101 MMOL/L (ref 98–107)
CHOLESTEROL: 186 MG/DL
CO2: 19 MMOL/L (ref 22–29)
CREAT SERPL-MCNC: 1.1 MG/DL (ref 0.7–1.2)
EOSINOPHILS ABSOLUTE: 0.31 K/UL (ref 0.05–0.5)
EOSINOPHILS RELATIVE PERCENT: 4 % (ref 0–6)
GFR SERPL CREATININE-BSD FRML MDRD: >60 ML/MIN/1.73M2
GLUCOSE BLD-MCNC: 111 MG/DL (ref 74–99)
HBA1C MFR BLD: 6.4 % (ref 4–5.6)
HCT VFR BLD CALC: 45.8 % (ref 37–54)
HDLC SERPL-MCNC: 39 MG/DL
HEMOGLOBIN: 14.4 G/DL (ref 12.5–16.5)
IMMATURE GRANULOCYTES: 1 % (ref 0–5)
LDL CHOLESTEROL: 120 MG/DL
LYMPHOCYTES ABSOLUTE: 1.77 K/UL (ref 1.5–4)
LYMPHOCYTES RELATIVE PERCENT: 25 % (ref 20–42)
MCH RBC QN AUTO: 28.6 PG (ref 26–35)
MCHC RBC AUTO-ENTMCNC: 31.4 G/DL (ref 32–34.5)
MCV RBC AUTO: 90.9 FL (ref 80–99.9)
MONOCYTES ABSOLUTE: 0.55 K/UL (ref 0.1–0.95)
MONOCYTES RELATIVE PERCENT: 8 % (ref 2–12)
NEUTROPHILS ABSOLUTE: 4.24 K/UL (ref 1.8–7.3)
NEUTROPHILS RELATIVE PERCENT: 61 % (ref 43–80)
PDW BLD-RTO: 14.2 % (ref 11.5–15)
PLATELET # BLD: 252 K/UL (ref 130–450)
PMV BLD AUTO: 10.2 FL (ref 7–12)
POTASSIUM SERPL-SCNC: 4.9 MMOL/L (ref 3.5–5)
RBC # BLD: 5.04 M/UL (ref 3.8–5.8)
SODIUM BLD-SCNC: 138 MMOL/L (ref 132–146)
T4 TOTAL: 7.6 UG/DL (ref 4.5–11.7)
TOTAL PROTEIN: 7.7 G/DL (ref 6.4–8.3)
TRIGL SERPL-MCNC: 137 MG/DL
TSH SERPL DL<=0.05 MIU/L-ACNC: 2.65 UIU/ML (ref 0.27–4.2)
VLDLC SERPL CALC-MCNC: 27 MG/DL
WBC # BLD: 7 K/UL (ref 4.5–11.5)

## 2023-10-04 NOTE — TELEPHONE ENCOUNTER
Patient scheduled 10/6 for apt. Patient asked about getting the covid and flu vaccines prior to surgery. Patient given preadmission testings phone number to discuss this with them.

## 2023-10-04 NOTE — TELEPHONE ENCOUNTER
Pt called in to [facility] an appt. Pt is [facility] for surgery on 10/30/23. He would like to see doctor before his surgery. CT was completed at [facility]. Chante Cruz can be reached at [phone]. Thank you.

## 2023-10-06 ENCOUNTER — OFFICE VISIT (OUTPATIENT)
Dept: ENT CLINIC | Age: 58
End: 2023-10-06

## 2023-10-06 VITALS
HEIGHT: 73 IN | BODY MASS INDEX: 41.75 KG/M2 | TEMPERATURE: 98.4 F | RESPIRATION RATE: 12 BRPM | HEART RATE: 83 BPM | OXYGEN SATURATION: 98 % | DIASTOLIC BLOOD PRESSURE: 87 MMHG | WEIGHT: 315 LBS | SYSTOLIC BLOOD PRESSURE: 139 MMHG

## 2023-10-06 DIAGNOSIS — J30.1 SEASONAL ALLERGIC RHINITIS DUE TO POLLEN: Primary | ICD-10-CM

## 2023-10-06 DIAGNOSIS — J32.4 CHRONIC PANSINUSITIS: ICD-10-CM

## 2023-10-06 DIAGNOSIS — J34.2 DNS (DEVIATED NASAL SEPTUM): ICD-10-CM

## 2023-10-06 PROCEDURE — PREOPEXAM PRE-OP EXAM: Performed by: OTOLARYNGOLOGY

## 2023-10-06 ASSESSMENT — ENCOUNTER SYMPTOMS
RHINORRHEA: 1
COLOR CHANGE: 0
SINUS PRESSURE: 1
GASTROINTESTINAL NEGATIVE: 1
RESPIRATORY NEGATIVE: 1
EYES NEGATIVE: 1
SHORTNESS OF BREATH: 0
ABDOMINAL PAIN: 0

## 2023-10-06 NOTE — H&P (VIEW-ONLY)
Subjective:      Patient ID:  Verenice Stafford is a 62 y.o. male. HPI:    Patient presents today for discussion of surgery. Condition has been present for 4 month(s).        Past Medical History:   Diagnosis Date    Asthma     Diabetes mellitus ([address])     Hypertension     Prolonged emergence from general anesthesia      Past Surgical History:   Procedure Laterality Date    APPENDECTOMY      COLONOSCOPY N/A 4/15/2021    COLONOSCOPY WITH BIOPSY performed by Geoffrey Churchill DO at [address] [place] [address] Drive      TONSILLECTOMY      UPPER GASTROINTESTINAL ENDOSCOPY N/A 4/15/2021    EGD ESOPHAGOGASTRODUODENOSCOPY DILATATION performed by Geoffrey Churchill DO at [facility] ENDOSCOPY     Family History   Problem Relation Age of Onset    Cancer Mother     Diabetes Mother      Social History     Socioeconomic History    Marital status: Single     Spouse name: None    Number of children: 4    Years of education: 14    Highest education level: None   Occupational History     Employer: SELF-EMPLOYED   Tobacco Use    Smoking status: Former     Packs/day: 2.00     Years: 17.00     Additional pack years: 0.00     Total pack years: 34.00     Types: Cigarettes     Start date:      Quit date: 1999     Years since quittin.8    Smokeless tobacco: Never   Vaping Use    Vaping Use: Never used   Substance and Sexual Activity    Alcohol use: No    Drug use: Not Currently     Social Determinants of Health     Financial Resource Strain: Low Risk  (2023)    Overall Financial Resource Strain (CARDIA)     Difficulty of Paying Living Expenses: Not hard at all   Food Insecurity: No Food Insecurity (2023)    Hunger Vital Sign     Worried About Running Out of Food in the Last Year: Never true     [address] in the Last Year: Never true   Transportation Needs: Unknown (2023)    PRAPARE - Transportation     Lack of Transportation (Non-Medical): No   Housing Stability: Unknown (2023)    Housing Stability back: Normal range of motion and neck supple. Skin:     General: Skin is warm and dry. Neurological:      Mental Status: He is alert and oriented to person, place, and time. Assessment:       Diagnosis Orders   1. Seasonal allergic rhinitis due to pollen        2. Chronic pansinusitis        3. DNS (deviated nasal septum)                   Plan:       Will proceed with surgery,all questions answered  Follow up as scheduled     RX given today:

## 2023-10-06 NOTE — PROGRESS NOTES
Subjective:      Patient ID:  Jovany López is a 62 y.o. male. HPI:    Patient presents today for discussion of surgery. Condition has been present for 4 month(s).        Past Medical History:   Diagnosis Date    Asthma     Diabetes mellitus ([address])     Hypertension     Prolonged emergence from general anesthesia      Past Surgical History:   Procedure Laterality Date    APPENDECTOMY      COLONOSCOPY N/A 4/15/2021    COLONOSCOPY WITH BIOPSY performed by Jess Lewis DO at [address] [place] [address] Drive      TONSILLECTOMY      UPPER GASTROINTESTINAL ENDOSCOPY N/A 4/15/2021    EGD ESOPHAGOGASTRODUODENOSCOPY DILATATION performed by Jess Lewis DO at [facility] ENDOSCOPY     Family History   Problem Relation Age of Onset    Cancer Mother     Diabetes Mother      Social History     Socioeconomic History    Marital status: Single     Spouse name: None    Number of children: 4    Years of education: 14    Highest education level: None   Occupational History     Employer: SELF-EMPLOYED   Tobacco Use    Smoking status: Former     Packs/day: 2.00     Years: 17.00     Additional pack years: 0.00     Total pack years: 34.00     Types: Cigarettes     Start date:      Quit date: 1999     Years since quittin.8    Smokeless tobacco: Never   Vaping Use    Vaping Use: Never used   Substance and Sexual Activity    Alcohol use: No    Drug use: Not Currently     Social Determinants of Health     Financial Resource Strain: Low Risk  (2023)    Overall Financial Resource Strain (CARDIA)     Difficulty of Paying Living Expenses: Not hard at all   Food Insecurity: No Food Insecurity (2023)    Hunger Vital Sign     Worried About Running Out of Food in the Last Year: Never true     [address] in the Last Year: Never true   Transportation Needs: Unknown (2023)    PRAPARE - Transportation     Lack of Transportation (Non-Medical): No   Housing Stability: Unknown (2023)    Housing Stability

## 2023-10-09 ENCOUNTER — OFFICE VISIT (OUTPATIENT)
Dept: PRIMARY CARE CLINIC | Age: 58
End: 2023-10-09
Payer: COMMERCIAL

## 2023-10-09 VITALS
SYSTOLIC BLOOD PRESSURE: 144 MMHG | HEART RATE: 81 BPM | WEIGHT: 315 LBS | DIASTOLIC BLOOD PRESSURE: 82 MMHG | OXYGEN SATURATION: 96 % | TEMPERATURE: 97.7 F | BODY MASS INDEX: 55.94 KG/M2 | RESPIRATION RATE: 17 BRPM

## 2023-10-09 DIAGNOSIS — Z23 IMMUNIZATION DUE: Primary | ICD-10-CM

## 2023-10-09 DIAGNOSIS — I15.9 SECONDARY HYPERTENSION: ICD-10-CM

## 2023-10-09 DIAGNOSIS — M17.11 PRIMARY OSTEOARTHRITIS OF RIGHT KNEE: ICD-10-CM

## 2023-10-09 DIAGNOSIS — E11.9 TYPE 2 DIABETES MELLITUS WITHOUT COMPLICATION, WITHOUT LONG-TERM CURRENT USE OF INSULIN (HCC): ICD-10-CM

## 2023-10-09 PROCEDURE — 99214 OFFICE O/P EST MOD 30 MIN: CPT | Performed by: FAMILY MEDICINE

## 2023-10-09 PROCEDURE — 90674 CCIIV4 VAC NO PRSV 0.5 ML IM: CPT | Performed by: FAMILY MEDICINE

## 2023-10-09 PROCEDURE — 3017F COLORECTAL CA SCREEN DOC REV: CPT | Performed by: FAMILY MEDICINE

## 2023-10-09 PROCEDURE — G8417 CALC BMI ABV UP PARAM F/U: HCPCS | Performed by: FAMILY MEDICINE

## 2023-10-09 PROCEDURE — 3044F HG A1C LEVEL LT 7.0%: CPT | Performed by: FAMILY MEDICINE

## 2023-10-09 PROCEDURE — 2022F DILAT RTA XM EVC RTNOPTHY: CPT | Performed by: FAMILY MEDICINE

## 2023-10-09 PROCEDURE — G8482 FLU IMMUNIZE ORDER/ADMIN: HCPCS | Performed by: FAMILY MEDICINE

## 2023-10-09 PROCEDURE — 90471 IMMUNIZATION ADMIN: CPT | Performed by: FAMILY MEDICINE

## 2023-10-09 PROCEDURE — G8427 DOCREV CUR MEDS BY ELIG CLIN: HCPCS | Performed by: FAMILY MEDICINE

## 2023-10-09 PROCEDURE — 1036F TOBACCO NON-USER: CPT | Performed by: FAMILY MEDICINE

## 2023-10-09 RX ORDER — CELECOXIB 200 MG/1
200 CAPSULE ORAL 2 TIMES DAILY
Qty: 60 CAPSULE | Refills: 0 | Status: CANCELLED | OUTPATIENT
Start: 2023-10-09

## 2023-10-09 ASSESSMENT — ENCOUNTER SYMPTOMS
ALLERGIC/IMMUNOLOGIC NEGATIVE: 1
RESPIRATORY NEGATIVE: 1
GASTROINTESTINAL NEGATIVE: 1
EYES NEGATIVE: 1

## 2023-10-09 NOTE — PROGRESS NOTES
10/9/23     Isa Lawrence    : 1965 Sex: male   Age: 62 y.o. Chief Complaint   Patient presents with    Follow-up     4 week follow up     Follow up from surgery: rhinoplasty? ? Discuss Labs     Review labs     Other     CARE GAP: due for Flu vaccine:patient wants        Prior to Admission medications    Medication Sig Start Date End Date Taking? Authorizing Provider   gabapentin (NEURONTIN) 100 MG capsule Take 1 capsule by mouth nightly for 120 days. Intended supply: 30 days 23 Yes Traikoff, Mamie Fothergill, DO   lisinopril (PRINIVIL;ZESTRIL) 40 MG tablet 1 qd 23  Yes Traikoff, Mamie Fothergill, DO   pioglitazone (ACTOS) 30 MG tablet Take 1/2 (one-half) tablet by mouth once daily 23  Yes Traikoff, Mamie Fothergill, DO   fluticasone-salmeterol (ADVAIR DISKUS) 250-50 MCG/ACT AEPB diskus inhaler Inhale 1 puff into the lungs in the morning and 1 puff in the evening. 23  Yes Che Dash DO   Azelastine HCl (ASTEPRO NA) by Nasal route   Yes Provider, MD Richard   azelastine HCl 0.15 % SOLN 1 spray by Nasal route 2 times daily 3/1/22  Yes Provider, MD Richard   ASPIRIN 81 PO Take by mouth daily   Yes Provider, MD Richard   ciclopirox (LOPROX) 0.77 % cream Apply topically 2 times daily Apply topically 2 times daily.    Yes Provider, MD Richard   fexofenadine (ALLEGRA) 180 MG tablet Take 1 tablet by mouth daily   Yes Provider, Historical, MD   albuterol sulfate HFA (PROVENTIL;VENTOLIN;PROAIR) 108 (90 Base) MCG/ACT inhaler Inhale 2 puffs into the lungs 4 times daily as needed for Wheezing 22  Yes DONATO Lester III   fluticasone [facility]) 50 MCG/ACT nasal spray 2 sprays by Each Nostril route daily 22  Yes Ardell Oiler, APRN - CNP   montelukast (SINGULAIR) 10 MG tablet Take 1 tablet by mouth nightly   Yes Provider, MD Richard   albuterol (PROVENTIL) (2.5 MG/3ML) 0.083% nebulizer solution Take 3 mLs by nebulization every 6 hours as needed   Yes Provider, Richard,

## 2023-10-20 DIAGNOSIS — I15.9 SECONDARY HYPERTENSION: ICD-10-CM

## 2023-10-20 RX ORDER — LISINOPRIL 40 MG/1
TABLET ORAL
Qty: 90 TABLET | Refills: 3 | Status: SHIPPED | OUTPATIENT
Start: 2023-10-20

## 2023-10-22 SDOH — HEALTH STABILITY: PHYSICAL HEALTH
ON AVERAGE, HOW MANY DAYS PER WEEK DO YOU ENGAGE IN MODERATE TO STRENUOUS EXERCISE (LIKE A BRISK WALK)?: PATIENT DECLINED

## 2023-10-23 ENCOUNTER — OFFICE VISIT (OUTPATIENT)
Dept: ORTHOPEDIC SURGERY | Age: 58
End: 2023-10-23
Payer: COMMERCIAL

## 2023-10-23 DIAGNOSIS — M17.11 PRIMARY OSTEOARTHRITIS OF RIGHT KNEE: Primary | ICD-10-CM

## 2023-10-23 PROCEDURE — G8482 FLU IMMUNIZE ORDER/ADMIN: HCPCS | Performed by: STUDENT IN AN ORGANIZED HEALTH CARE EDUCATION/TRAINING PROGRAM

## 2023-10-23 PROCEDURE — G8427 DOCREV CUR MEDS BY ELIG CLIN: HCPCS | Performed by: STUDENT IN AN ORGANIZED HEALTH CARE EDUCATION/TRAINING PROGRAM

## 2023-10-23 PROCEDURE — 20610 DRAIN/INJ JOINT/BURSA W/O US: CPT | Performed by: STUDENT IN AN ORGANIZED HEALTH CARE EDUCATION/TRAINING PROGRAM

## 2023-10-23 PROCEDURE — G8417 CALC BMI ABV UP PARAM F/U: HCPCS | Performed by: STUDENT IN AN ORGANIZED HEALTH CARE EDUCATION/TRAINING PROGRAM

## 2023-10-23 PROCEDURE — 1036F TOBACCO NON-USER: CPT | Performed by: STUDENT IN AN ORGANIZED HEALTH CARE EDUCATION/TRAINING PROGRAM

## 2023-10-23 PROCEDURE — 99203 OFFICE O/P NEW LOW 30 MIN: CPT | Performed by: STUDENT IN AN ORGANIZED HEALTH CARE EDUCATION/TRAINING PROGRAM

## 2023-10-23 PROCEDURE — 3017F COLORECTAL CA SCREEN DOC REV: CPT | Performed by: STUDENT IN AN ORGANIZED HEALTH CARE EDUCATION/TRAINING PROGRAM

## 2023-10-23 RX ORDER — TRIAMCINOLONE ACETONIDE 40 MG/ML
80 INJECTION, SUSPENSION INTRA-ARTICULAR; INTRAMUSCULAR ONCE
Status: COMPLETED | OUTPATIENT
Start: 2023-10-23 | End: 2023-10-23

## 2023-10-23 RX ORDER — BUPIVACAINE HYDROCHLORIDE 2.5 MG/ML
3 INJECTION, SOLUTION INFILTRATION; PERINEURAL ONCE
Status: COMPLETED | OUTPATIENT
Start: 2023-10-23 | End: 2023-10-23

## 2023-10-23 RX ADMIN — TRIAMCINOLONE ACETONIDE 80 MG: 40 INJECTION, SUSPENSION INTRA-ARTICULAR; INTRAMUSCULAR at 11:15

## 2023-10-23 RX ADMIN — BUPIVACAINE HYDROCHLORIDE 7.5 MG: 2.5 INJECTION, SOLUTION INFILTRATION; PERINEURAL at 11:15

## 2023-10-23 NOTE — PROGRESS NOTES
care doctor for further evaluation of his cramping. He is happy with this treatment plan. All of his questions were answered in detail. Right knee Steroid Injection    The right knee identified as the injection site. The risk and benefits of a cortisone injection were explained and the patient consented to the injection. Under sterile conditions,the right knee was injected with a mixture of 80  mg of Kenalog and 3 mL of 0.25% Marcaine without complication. A sterile bandage was applied.         Leandro Blackburn DO  Orthopaedic Surgery   10/23/23   11:02 AM EDT

## 2023-10-26 NOTE — PROGRESS NOTES
[facility] PRE-ADMISSION TESTING INSTRUCTIONS    The Preadmission Testing patient is instructed accordingly using the following criteria (check applicable):    ARRIVAL INSTRUCTIONS:  [x] Parking the day of Surgery is located in the Main Entrance lot. Upon entering the door, make an immediate right to the surgery reception desk    [x] Bring photo ID and insurance card    [] Bring in a copy of Living will or Durable Power of  papers. [x] Please be sure to arrange for responsible adult to provide transportation to and from the hospital    [x] Please arrange for responsible adult to be with you for the 24 hour period post procedure due to having anesthesia    [x] If you awake am of surgery not feeling well or have temperature >100 please call [phone]    GENERAL INSTRUCTIONS:    [x] Nothing by mouth after midnight, including gum, candy, mints or water    [x] You may brush your teeth, but do not swallow any water    [] Take medications as instructed with 1-2 oz of water    [] Stop herbal supplements and vitamins 5 days prior to procedure    [x] Follow preop dosing of blood thinners per physician instructions    [] Take 1/2 dose of evening insulin, but no insulin after midnight    [] No oral diabetic medications after midnight    [] If diabetic and have low blood sugar or feel symptomatic, take 1-2oz apple juice only    [x] Bring inhalers day of surgery    [] Bring C-PAP/ Bi-Pap day of surgery    [] Bring urine specimen day of surgery    [x] Shower or bath with soap, lather and rinse well, AM of Surgery, no lotion, powders or creams to surgical site    [] Follow bowel prep as instructed per surgeon    [x] No tobacco products within 24 hours of surgery     [x] No alcohol or illegal drug use within 24 hours of surgery.     [x] Jewelry, body piercing's, eyeglasses, contact lenses and dentures are not permitted into surgery (bring cases)      [] Please do not wear any nail polish,

## 2023-10-31 NOTE — PROGRESS NOTES
[facility] PRE-ADMISSION TESTING INSTRUCTIONS    The Preadmission Testing patient is instructed accordingly using the following criteria (check applicable):    ARRIVAL INSTRUCTIONS:  [x] Parking the day of surgery is located in the Main Entrance lot. Upon entering the door make an immediate right to the surgery reception desk. [x] Bring photo ID and insurance card. [] Bring in a copy of Living will or Durable Power of  papers. [x] Please be sure to arrange for responsible adult to provide transportation to and from the hospital.    [x] Please arrange for responsible adult to be with you for the 24 hour period post procedure due to having anesthesia. [x] If you awake am of surgery not feeling well or have temperature >100 please call [phone]. GENERAL INSTRUCTIONS:    [x] Nothing by mouth after midnight, including gum, candy, mints or water. [x] You may brush your teeth, but do not swallow any water.    [] Take medications as instructed with 1-2 oz of water.    [] Stop herbal supplements and vitamins 5 days prior to procedure. [x] Follow preop dosing of blood thinners per physician instructions. [] Take 1/2 dose of evening insulin, but no insulin after midnight. [x] No oral diabetic medications after midnight. [x] If diabetic and have low blood sugar or feel symptomatic, take 1-2oz apple juice only. [x] Bring inhalers day of surgery. [] Bring urine specimen day of surgery. [x] Shower or bath with soap, lather and rinse well AM of surgery. No lotion, powders or creams on your body. [] Follow bowel prep as instructed per surgeon. [x] No tobacco products within 24 hours of surgery . [x] No alcohol or illegal drug use within 24 hours of surgery. [x] Jewelry, body piercings , eyeglasses, contact lenses and dentures are not permitted into surgery (bring cases).       [x] Please do not wear any nail polish, make up or hair products on

## 2023-11-01 ENCOUNTER — ANESTHESIA (OUTPATIENT)
Dept: OPERATING ROOM | Age: 58
End: 2023-11-01
Payer: COMMERCIAL

## 2023-11-01 ENCOUNTER — HOSPITAL ENCOUNTER (OUTPATIENT)
Age: 58
Setting detail: OUTPATIENT SURGERY
Discharge: HOME | End: 2023-11-01
Attending: OTOLARYNGOLOGY | Admitting: OTOLARYNGOLOGY
Payer: COMMERCIAL

## 2023-11-01 ENCOUNTER — ANESTHESIA EVENT (OUTPATIENT)
Dept: OPERATING ROOM | Age: 58
End: 2023-11-01
Payer: COMMERCIAL

## 2023-11-01 VITALS
SYSTOLIC BLOOD PRESSURE: 160 MMHG | RESPIRATION RATE: 16 BRPM | OXYGEN SATURATION: 93 % | DIASTOLIC BLOOD PRESSURE: 95 MMHG | TEMPERATURE: 97.5 F | HEART RATE: 83 BPM | WEIGHT: 315 LBS | HEIGHT: 73 IN | BODY MASS INDEX: 41.75 KG/M2

## 2023-11-01 DIAGNOSIS — J32.4 CHRONIC PANSINUSITIS: Primary | ICD-10-CM

## 2023-11-01 DIAGNOSIS — J32.0 CHRONIC MAXILLARY SINUSITIS: ICD-10-CM

## 2023-11-01 PROBLEM — J34.2 DNS (DEVIATED NASAL SEPTUM): Status: ACTIVE | Noted: 2023-11-01

## 2023-11-01 PROBLEM — J34.3 HYPERTROPHY OF BOTH INFERIOR NASAL TURBINATES: Status: ACTIVE | Noted: 2023-11-01

## 2023-11-01 LAB
ANION GAP SERPL CALCULATED.3IONS-SCNC: 8 MMOL/L (ref 7–16)
BUN SERPL-MCNC: 21 MG/DL (ref 6–20)
CALCIUM SERPL-MCNC: 9.1 MG/DL (ref 8.6–10.2)
CHLORIDE SERPL-SCNC: 98 MMOL/L (ref 98–107)
CO2 SERPL-SCNC: 25 MMOL/L (ref 22–29)
CREAT SERPL-MCNC: 1 MG/DL (ref 0.7–1.2)
ERYTHROCYTE [DISTWIDTH] IN BLOOD BY AUTOMATED COUNT: 14.6 % (ref 11.5–15)
GFR SERPL CREATININE-BSD FRML MDRD: >60 ML/MIN/1.73M2
GLUCOSE SERPL-MCNC: 108 MG/DL (ref 74–99)
HCT VFR BLD AUTO: 45.6 % (ref 37–54)
HGB BLD-MCNC: 15.2 G/DL (ref 12.5–16.5)
MCH RBC QN AUTO: 28.8 PG (ref 26–35)
MCHC RBC AUTO-ENTMCNC: 33.3 G/DL (ref 32–34.5)
MCV RBC AUTO: 86.5 FL (ref 80–99.9)
PLATELET # BLD AUTO: 280 K/UL (ref 130–450)
PMV BLD AUTO: 9.4 FL (ref 7–12)
POTASSIUM SERPL-SCNC: 5.7 MMOL/L (ref 3.5–5)
RBC # BLD AUTO: 5.27 M/UL (ref 3.8–5.8)
SODIUM SERPL-SCNC: 131 MMOL/L (ref 132–146)
WBC OTHER # BLD: 11.5 K/UL (ref 4.5–11.5)

## 2023-11-01 PROCEDURE — 3700000000 HC ANESTHESIA ATTENDED CARE: Performed by: OTOLARYNGOLOGY

## 2023-11-01 PROCEDURE — 80048 BASIC METABOLIC PNL TOTAL CA: CPT

## 2023-11-01 PROCEDURE — 30802 ABLATE INF TURBINATE SUBMUC: CPT | Performed by: OTOLARYNGOLOGY

## 2023-11-01 PROCEDURE — C1894 INTRO/SHEATH, NON-LASER: HCPCS | Performed by: OTOLARYNGOLOGY

## 2023-11-01 PROCEDURE — 2709999900 HC NON-CHARGEABLE SUPPLY: Performed by: OTOLARYNGOLOGY

## 2023-11-01 PROCEDURE — 7100000011 HC PHASE II RECOVERY - ADDTL 15 MIN: Performed by: OTOLARYNGOLOGY

## 2023-11-01 PROCEDURE — 2500000003 HC RX 250 WO HCPCS: Performed by: NURSE ANESTHETIST, CERTIFIED REGISTERED

## 2023-11-01 PROCEDURE — 2500000003 HC RX 250 WO HCPCS: Performed by: OTOLARYNGOLOGY

## 2023-11-01 PROCEDURE — 7100000010 HC PHASE II RECOVERY - FIRST 15 MIN: Performed by: OTOLARYNGOLOGY

## 2023-11-01 PROCEDURE — 6370000000 HC RX 637 (ALT 250 FOR IP): Performed by: OTOLARYNGOLOGY

## 2023-11-01 PROCEDURE — 2580000003 HC RX 258: Performed by: NURSE ANESTHETIST, CERTIFIED REGISTERED

## 2023-11-01 PROCEDURE — 6360000002 HC RX W HCPCS: Performed by: ANESTHESIOLOGY

## 2023-11-01 PROCEDURE — 3600000003 HC SURGERY LEVEL 3 BASE: Performed by: OTOLARYNGOLOGY

## 2023-11-01 PROCEDURE — 6360000002 HC RX W HCPCS

## 2023-11-01 PROCEDURE — 31295 NSL/SINS NDSC SURG MAX SINS: CPT | Performed by: OTOLARYNGOLOGY

## 2023-11-01 PROCEDURE — 31254 NSL/SINS NDSC W/PRTL ETHMDCT: CPT | Performed by: OTOLARYNGOLOGY

## 2023-11-01 PROCEDURE — 7100000000 HC PACU RECOVERY - FIRST 15 MIN: Performed by: OTOLARYNGOLOGY

## 2023-11-01 PROCEDURE — 7100000001 HC PACU RECOVERY - ADDTL 15 MIN: Performed by: OTOLARYNGOLOGY

## 2023-11-01 PROCEDURE — 3600000013 HC SURGERY LEVEL 3 ADDTL 15MIN: Performed by: OTOLARYNGOLOGY

## 2023-11-01 PROCEDURE — 85027 COMPLETE CBC AUTOMATED: CPT

## 2023-11-01 PROCEDURE — 30520 REPAIR OF NASAL SEPTUM: CPT | Performed by: OTOLARYNGOLOGY

## 2023-11-01 PROCEDURE — 6360000002 HC RX W HCPCS: Performed by: NURSE ANESTHETIST, CERTIFIED REGISTERED

## 2023-11-01 PROCEDURE — 31298 NSL/SINS NDSC SURG FRNT&SPHN: CPT | Performed by: OTOLARYNGOLOGY

## 2023-11-01 PROCEDURE — 88304 TISSUE EXAM BY PATHOLOGIST: CPT

## 2023-11-01 PROCEDURE — 2720000010 HC SURG SUPPLY STERILE: Performed by: OTOLARYNGOLOGY

## 2023-11-01 PROCEDURE — 3700000001 HC ADD 15 MINUTES (ANESTHESIA): Performed by: OTOLARYNGOLOGY

## 2023-11-01 PROCEDURE — C2625 STENT, NON-COR, TEM W/DEL SY: HCPCS | Performed by: OTOLARYNGOLOGY

## 2023-11-01 PROCEDURE — C1726 CATH, BAL DIL, NON-VASCULAR: HCPCS | Performed by: OTOLARYNGOLOGY

## 2023-11-01 DEVICE — PROPEL CONTOUR SINUS IMPLANT
Type: IMPLANTABLE DEVICE | Site: NOSE | Status: FUNCTIONAL
Brand: PROPEL CONTOUR

## 2023-11-01 DEVICE — PROPEL MINI SINUS IMPLANT
Type: IMPLANTABLE DEVICE | Site: NOSE | Status: FUNCTIONAL
Brand: PROPEL MINI

## 2023-11-01 RX ORDER — SODIUM CHLORIDE 0.9 % (FLUSH) 0.9 %
5-40 SYRINGE (ML) INJECTION PRN
Status: DISCONTINUED | OUTPATIENT
Start: 2023-11-01 | End: 2023-11-01 | Stop reason: HOSPADM

## 2023-11-01 RX ORDER — SODIUM CHLORIDE 9 MG/ML
INJECTION, SOLUTION INTRAVENOUS PRN
Status: DISCONTINUED | OUTPATIENT
Start: 2023-11-01 | End: 2023-11-01 | Stop reason: HOSPADM

## 2023-11-01 RX ORDER — BACITRACIN ZINC 500 [USP'U]/G
OINTMENT TOPICAL PRN
Status: DISCONTINUED | OUTPATIENT
Start: 2023-11-01 | End: 2023-11-01 | Stop reason: ALTCHOICE

## 2023-11-01 RX ORDER — ONDANSETRON 2 MG/ML
INJECTION INTRAMUSCULAR; INTRAVENOUS PRN
Status: DISCONTINUED | OUTPATIENT
Start: 2023-11-01 | End: 2023-11-01 | Stop reason: SDUPTHER

## 2023-11-01 RX ORDER — PHENYLEPHRINE HCL IN 0.9% NACL 1 MG/10 ML
SYRINGE (ML) INTRAVENOUS PRN
Status: DISCONTINUED | OUTPATIENT
Start: 2023-11-01 | End: 2023-11-01 | Stop reason: SDUPTHER

## 2023-11-01 RX ORDER — SODIUM CHLORIDE 0.9 % (FLUSH) 0.9 %
5-40 SYRINGE (ML) INJECTION EVERY 12 HOURS SCHEDULED
Status: DISCONTINUED | OUTPATIENT
Start: 2023-11-01 | End: 2023-11-01 | Stop reason: HOSPADM

## 2023-11-01 RX ORDER — DEXAMETHASONE SODIUM PHOSPHATE 4 MG/ML
INJECTION, SOLUTION INTRA-ARTICULAR; INTRALESIONAL; INTRAMUSCULAR; INTRAVENOUS; SOFT TISSUE PRN
Status: DISCONTINUED | OUTPATIENT
Start: 2023-11-01 | End: 2023-11-01 | Stop reason: SDUPTHER

## 2023-11-01 RX ORDER — MIDAZOLAM HYDROCHLORIDE 1 MG/ML
INJECTION INTRAMUSCULAR; INTRAVENOUS PRN
Status: DISCONTINUED | OUTPATIENT
Start: 2023-11-01 | End: 2023-11-01 | Stop reason: SDUPTHER

## 2023-11-01 RX ORDER — MEPERIDINE HYDROCHLORIDE 25 MG/ML
12.5 INJECTION INTRAMUSCULAR; INTRAVENOUS; SUBCUTANEOUS ONCE
Status: COMPLETED | OUTPATIENT
Start: 2023-11-01 | End: 2023-11-01

## 2023-11-01 RX ORDER — FENTANYL CITRATE 50 UG/ML
INJECTION, SOLUTION INTRAMUSCULAR; INTRAVENOUS
Status: COMPLETED
Start: 2023-11-01 | End: 2023-11-01

## 2023-11-01 RX ORDER — FENTANYL CITRATE 50 UG/ML
50 INJECTION, SOLUTION INTRAMUSCULAR; INTRAVENOUS EVERY 5 MIN PRN
Status: DISCONTINUED | OUTPATIENT
Start: 2023-11-01 | End: 2023-11-01 | Stop reason: HOSPADM

## 2023-11-01 RX ORDER — SODIUM CHLORIDE 9 MG/ML
INJECTION, SOLUTION INTRAVENOUS CONTINUOUS PRN
Status: DISCONTINUED | OUTPATIENT
Start: 2023-11-01 | End: 2023-11-01 | Stop reason: SDUPTHER

## 2023-11-01 RX ORDER — PROPOFOL 10 MG/ML
INJECTION, EMULSION INTRAVENOUS PRN
Status: DISCONTINUED | OUTPATIENT
Start: 2023-11-01 | End: 2023-11-01 | Stop reason: SDUPTHER

## 2023-11-01 RX ORDER — SUCCINYLCHOLINE/SOD CL,ISO/PF 200MG/10ML
SYRINGE (ML) INTRAVENOUS PRN
Status: DISCONTINUED | OUTPATIENT
Start: 2023-11-01 | End: 2023-11-01 | Stop reason: SDUPTHER

## 2023-11-01 RX ORDER — HYDROCODONE BITARTRATE AND ACETAMINOPHEN 5; 325 MG/1; MG/1
1 TABLET ORAL EVERY 4 HOURS PRN
Qty: 18 TABLET | Refills: 0 | Status: SHIPPED | OUTPATIENT
Start: 2023-11-01 | End: 2023-11-04

## 2023-11-01 RX ORDER — ROCURONIUM BROMIDE 10 MG/ML
INJECTION, SOLUTION INTRAVENOUS PRN
Status: DISCONTINUED | OUTPATIENT
Start: 2023-11-01 | End: 2023-11-01 | Stop reason: SDUPTHER

## 2023-11-01 RX ORDER — LIDOCAINE HYDROCHLORIDE AND EPINEPHRINE 10; 10 MG/ML; UG/ML
INJECTION, SOLUTION INFILTRATION; PERINEURAL PRN
Status: DISCONTINUED | OUTPATIENT
Start: 2023-11-01 | End: 2023-11-01 | Stop reason: ALTCHOICE

## 2023-11-01 RX ORDER — PROCHLORPERAZINE EDISYLATE 5 MG/ML
5 INJECTION INTRAMUSCULAR; INTRAVENOUS
Status: COMPLETED | OUTPATIENT
Start: 2023-11-01 | End: 2023-11-01

## 2023-11-01 RX ORDER — FENTANYL CITRATE 50 UG/ML
INJECTION, SOLUTION INTRAMUSCULAR; INTRAVENOUS PRN
Status: DISCONTINUED | OUTPATIENT
Start: 2023-11-01 | End: 2023-11-01 | Stop reason: SDUPTHER

## 2023-11-01 RX ORDER — SODIUM CHLORIDE 9 MG/ML
INJECTION, SOLUTION INTRAVENOUS CONTINUOUS
Status: DISCONTINUED | OUTPATIENT
Start: 2023-11-01 | End: 2023-11-01 | Stop reason: HOSPADM

## 2023-11-01 RX ORDER — OXYMETAZOLINE HYDROCHLORIDE 0.05 G/100ML
2 SPRAY NASAL
Status: COMPLETED | OUTPATIENT
Start: 2023-11-01 | End: 2023-11-01

## 2023-11-01 RX ORDER — LIDOCAINE HYDROCHLORIDE 20 MG/ML
INJECTION, SOLUTION EPIDURAL; INFILTRATION; INTRACAUDAL; PERINEURAL PRN
Status: DISCONTINUED | OUTPATIENT
Start: 2023-11-01 | End: 2023-11-01 | Stop reason: SDUPTHER

## 2023-11-01 RX ORDER — DIPHENHYDRAMINE HYDROCHLORIDE 50 MG/ML
12.5 INJECTION INTRAMUSCULAR; INTRAVENOUS
Status: DISCONTINUED | OUTPATIENT
Start: 2023-11-01 | End: 2023-11-01 | Stop reason: HOSPADM

## 2023-11-01 RX ADMIN — PROCHLORPERAZINE EDISYLATE 5 MG: 5 INJECTION, SOLUTION INTRAMUSCULAR; INTRAVENOUS at 11:23

## 2023-11-01 RX ADMIN — SODIUM CHLORIDE: 9 INJECTION, SOLUTION INTRAVENOUS at 09:14

## 2023-11-01 RX ADMIN — Medication 150 MCG: at 09:15

## 2023-11-01 RX ADMIN — Medication 150 MCG: at 09:29

## 2023-11-01 RX ADMIN — NASAL DECONGESTANT 2 SPRAY: 0.05 SPRAY NASAL at 06:48

## 2023-11-01 RX ADMIN — Medication 200 MG: at 08:00

## 2023-11-01 RX ADMIN — DEXAMETHASONE SODIUM PHOSPHATE 10 MG: 4 INJECTION, SOLUTION INTRAMUSCULAR; INTRAVENOUS at 08:05

## 2023-11-01 RX ADMIN — MEPERIDINE HYDROCHLORIDE 12.5 MG: 25 INJECTION, SOLUTION INTRAMUSCULAR; INTRAVENOUS; SUBCUTANEOUS at 10:26

## 2023-11-01 RX ADMIN — Medication 150 MCG: at 09:22

## 2023-11-01 RX ADMIN — LIDOCAINE HYDROCHLORIDE 60 MG: 20 INJECTION, SOLUTION EPIDURAL; INFILTRATION; INTRACAUDAL; PERINEURAL at 07:59

## 2023-11-01 RX ADMIN — FENTANYL CITRATE 50 MCG: 50 INJECTION, SOLUTION INTRAMUSCULAR; INTRAVENOUS at 10:18

## 2023-11-01 RX ADMIN — FENTANYL CITRATE 100 MCG: 50 INJECTION, SOLUTION INTRAMUSCULAR; INTRAVENOUS at 07:59

## 2023-11-01 RX ADMIN — ONDANSETRON 4 MG: 2 INJECTION INTRAMUSCULAR; INTRAVENOUS at 09:22

## 2023-11-01 RX ADMIN — Medication 150 MCG: at 09:06

## 2023-11-01 RX ADMIN — PROPOFOL 200 MG: 10 INJECTION, EMULSION INTRAVENOUS at 07:59

## 2023-11-01 RX ADMIN — ROCURONIUM BROMIDE 40 MG: 10 INJECTION, SOLUTION INTRAVENOUS at 08:10

## 2023-11-01 RX ADMIN — MIDAZOLAM 2 MG: 1 INJECTION INTRAMUSCULAR; INTRAVENOUS at 07:39

## 2023-11-01 RX ADMIN — SODIUM CHLORIDE: 9 INJECTION, SOLUTION INTRAVENOUS at 07:34

## 2023-11-01 RX ADMIN — SUGAMMADEX 400 MG: 100 INJECTION, SOLUTION INTRAVENOUS at 09:38

## 2023-11-01 ASSESSMENT — PAIN - FUNCTIONAL ASSESSMENT
PAIN_FUNCTIONAL_ASSESSMENT: 0-10
PAIN_FUNCTIONAL_ASSESSMENT: PREVENTS OR INTERFERES SOME ACTIVE ACTIVITIES AND ADLS
PAIN_FUNCTIONAL_ASSESSMENT: PREVENTS OR INTERFERES SOME ACTIVE ACTIVITIES AND ADLS

## 2023-11-01 ASSESSMENT — ENCOUNTER SYMPTOMS: SHORTNESS OF BREATH: 0

## 2023-11-01 ASSESSMENT — PAIN DESCRIPTION - DESCRIPTORS
DESCRIPTORS: DISCOMFORT;SORE
DESCRIPTORS: DISCOMFORT;BURNING
DESCRIPTORS: DISCOMFORT;BURNING

## 2023-11-01 ASSESSMENT — PAIN DESCRIPTION - LOCATION
LOCATION: NOSE

## 2023-11-01 ASSESSMENT — LIFESTYLE VARIABLES: SMOKING_STATUS: 0

## 2023-11-01 ASSESSMENT — PAIN SCALES - GENERAL
PAINLEVEL_OUTOF10: 7
PAINLEVEL_OUTOF10: 0
PAINLEVEL_OUTOF10: 8
PAINLEVEL_OUTOF10: 4

## 2023-11-01 NOTE — ANESTHESIA POSTPROCEDURE EVALUATION
Department of Anesthesiology  Postprocedure Note    Patient: Karo Lal  MRN: [id number]  YOB: 1965  Date of evaluation: 11/1/2023      Procedure Summary     Date: 11/01/23 Room / Location: [facility] OR 08 / [facility]    Anesthesia Start: 8380 Anesthesia Stop: 3579    Procedure: FUNCTIONAL ENDOSCOPIC SINUS SURGERY, SUBMUCOSAL RESECTION INFERIOR TURBINATES, SEPTOPLASTY (Bilateral: Nose) Diagnosis:       Chronic maxillary sinusitis      (Chronic maxillary sinusitis [J32.0])    Surgeons: Rodrigue Leonard DO Responsible Provider: Selvin Valencia MD    Anesthesia Type: General ASA Status: 4          Anesthesia Type: General    Naun Phase I: Naun Score: 8    Naun Phase II: Naun Score: 10      Anesthesia Post Evaluation    Patient location during evaluation: PACU  Patient participation: complete - patient participated  Level of consciousness: awake and alert  Airway patency: patent  Nausea & Vomiting: no vomiting and no nausea  Complications: no  Cardiovascular status: blood pressure returned to baseline  Respiratory status: acceptable  Hydration status: euvolemic  Multimodal analgesia pain management approach  Pain management: adequate

## 2023-11-01 NOTE — OP NOTE
11/1/2023  Vaishali Webber  [id number]      Pre-operative Diagnosis: Chronic sinusitis, deviated nasal septum, enlarged inferior turbinates    Post-operative Diagnosis: same    Procedure: Functional endoscopic sinus surgery with bilateral maxillary, left frontal and sphenoid antrostomies, right anterior ethmoid antrostomy, submucous resection of inferior turbinates, septoplasty      Anesthesia: General endotracheal anesthesia    Surgeons/Assistants: Reynaldo    Estimated Blood Loss: less than 50     Complications: None    Specimens: was Obtained              Prep  The patient was consented preoperatively and taken back to the operating room, identified appropriately, placed in the supine position, given anesthesia for general intubation. The patient was intubated. They were prepped and draped in a sterile fashion. Initial prep for the nose was 4% cocaine pledgets placed into both nasal cavities and the patient's nasal walls medial and lateral along the septal line and then along the inferior turbinate were injected with approximately 4mL of 1% lidocaine with epinephrine. That was total for both sides. The pledgets were allowed to sit for approximately 5 minutes, while the rest of the patient's prep was done. Septoplasty  The septum was found to deviated on the left side of the nose with a spur. The septoplasty balloon was then placed into the left nostril with a large nasal speculum in the right for resistance. The balloon was inflated to 8 keith pressure and held for two minutes. The balloon was replaced in the nose and a second dilation was done to move the inferior portion of the septum to the right as well. A #15 blade was then used to make an incision on the setum over the remaining deviated cartilage on the left and a suction freer was used to exposed the excess cartilage. A freer septum knifewas then used to cut the excess septal cartilage from the left nasal cavity.   The piece wa removed with a bean was clearly visible as the light was found just under the patient's brow in the area of the frontal sinus. A guidewire was left in place. The balloon was pushed over it until the balloon was straddling the frontal recess. The 6-mm balloon was then used to dilate, being dilated up to 12 mL of water pressure. This was held for 3 seconds and removed. There was a second more inferior   inflation to open up the frontal recess further. Once the frontal sinus was opened, it was visually examined and it was found to be large wide opening. The frontal recess was then irrigated out with 240 mL of normal saline to clear out the frontal sinus. The balloon was then retracted and the frontal sinus seeker was withdrawn. RIGHT:  Using the [facility], the patient's middle turbinate was medialized until the ethmoid and uncinate process were in full view. Injections were placed at the root of the middle turbinate and approximately 1 to 2 mL were placed in the root of the middle turbinate for anesthesia as well as to control bleeding. The frontal sinus guide from [employer] was then used to try to find the patient's frontal sinus. The guidewire was used and I was not visually able to pass the guidewire into the patient's frontal sinus. Multiple attempts were done with no success so the process was aborted and the balloon was then retracted and the frontal sinus seeker was withdrawn. Maxillary   LEFT:  With the maxillary sinuses, starting on the left side, the patient was found to have an accessory os. The patient was not also found to have a anthony bullosa which was not reduced to allow access into the Osteomeatal complex. The maxillary sinus guidewire was used to go to the posterior aspect of the uncinate. The tip of the guidewire was placed just lateral to the uncinate process and the guidewire was advanced. Once the guidewire was placed across the ostium, visualization was found by light in the left maxillary sinus.  The

## 2023-11-01 NOTE — INTERVAL H&P NOTE
Guera Chong was reexamined preoperatively today, 11/1/2023. There is no substantial change in his physical status since the time of his pre-anesthesia testing. He is fit for the proposed surgical procedure. Guera Chong has no further questions regarding the risks, benefits, nature and alternatives of surgery and wishes to proceed.

## 2023-11-03 LAB — SURGICAL PATHOLOGY REPORT: NORMAL

## 2023-11-08 ENCOUNTER — OFFICE VISIT (OUTPATIENT)
Dept: ENT CLINIC | Age: 58
End: 2023-11-08

## 2023-11-08 VITALS — HEIGHT: 73 IN | WEIGHT: 315 LBS | BODY MASS INDEX: 41.75 KG/M2

## 2023-11-08 DIAGNOSIS — J34.2 DNS (DEVIATED NASAL SEPTUM): Primary | ICD-10-CM

## 2023-11-08 PROCEDURE — 99024 POSTOP FOLLOW-UP VISIT: CPT | Performed by: OTOLARYNGOLOGY

## 2023-11-08 NOTE — PROGRESS NOTES
11/8/2023    Subjective:    Patient ID:  Luis Jordan is a 62 y.o. male. HPI Comments: Pt returns today for followup, s/p FESS, SMRITS and review of path report. Pt is  doing well. There are not problems from the surgical site. Pt is congested  but doing ok otherwise. No bleeding. Review of Systems   HENT: Positive for congestion and rhinorrhea. All other systems reviewed and are negative. Objective:    Physical Exam   Constitutional: She is oriented to person, place, and time. She appears well-developed and well-nourished. HENT:   Head: Normocephalic and atraumatic. Right Ear: Hearing, tympanic membrane, external ear and ear canal normal.   Left Ear: Hearing, tympanic membrane, external ear and ear canal normal.   Nose: Rhinorrhea present. Mouth/Throat: Uvula is midline, oropharynx is clear and moist and mucous membranes are normal.   Septum is straight  Crusting removed from both nasal cavities. Eyes: Conjunctivae and EOM are normal. Pupils are equal, round, and reactive to light. Neck: Normal range of motion. Neck supple. Cardiovascular: Normal rate, regular rhythm and normal heart sounds. Pulmonary/Chest: Effort normal and breath sounds normal.   Abdominal: Soft. Bowel sounds are normal.   Neurological: She is alert and oriented to person, place, and time. Vitals reviewed. Pathology:  -- Diagnosis --     Septal contents: Fibrocartilage, compatible with septal contents. Assessment:       Diagnosis Orders   1.  DNS (deviated nasal septum)                Plan:      Follow up in 4 month(s)     continue saline rinses and start Flonase again

## 2023-11-10 ENCOUNTER — PROCEDURE VISIT (OUTPATIENT)
Dept: PODIATRY | Age: 58
End: 2023-11-10

## 2023-11-10 DIAGNOSIS — M20.42 HAMMER TOES OF BOTH FEET: ICD-10-CM

## 2023-11-10 DIAGNOSIS — E11.9 TYPE 2 DIABETES MELLITUS WITHOUT COMPLICATION, UNSPECIFIED WHETHER LONG TERM INSULIN USE (HCC): Primary | ICD-10-CM

## 2023-11-10 DIAGNOSIS — G60.8 HEREDITARY SENSORY NEUROPATHY: ICD-10-CM

## 2023-11-10 DIAGNOSIS — M20.41 HAMMER TOES OF BOTH FEET: ICD-10-CM

## 2023-11-10 DIAGNOSIS — L84 CORNS AND CALLOSITIES: ICD-10-CM

## 2023-11-10 DIAGNOSIS — B35.1 TINEA UNGUIUM: ICD-10-CM

## 2023-11-10 NOTE — PROGRESS NOTES
Ruben Harper is here today for a diabetic foot exam and  nail care. his PCP is Bhavani Alonzo DO last OV was 10/09/2023. CC:    Follow-up diabetic foot ankle exam    HPI:   Follow-up diabetic foot ankle exam.  Long toenails and callus tissue both feet. No open wounds. No recent injury or trauma. No additional pedal complaints today. ROS:  Const: Denies constitutional symptoms  Musculo: Denies symptoms other than stated above  Skin: Denies symptoms other than stated above       Current Outpatient Medications:     lisinopril (PRINIVIL;ZESTRIL) 40 MG tablet, 1 qd, Disp: 90 tablet, Rfl: 3    gabapentin (NEURONTIN) 100 MG capsule, Take 1 capsule by mouth nightly for 120 days. Intended supply: 30 days, Disp: 30 capsule, Rfl: 3    pioglitazone (ACTOS) 30 MG tablet, Take 1/2 (one-half) tablet by mouth once daily, Disp: 45 tablet, Rfl: 3    fluticasone-salmeterol (ADVAIR DISKUS) 250-50 MCG/ACT AEPB diskus inhaler, Inhale 1 puff into the lungs in the morning and 1 puff in the evening., Disp: 60 each, Rfl: 3    ASPIRIN 81 PO, Take by mouth daily, Disp: , Rfl:     ciclopirox (LOPROX) 0.77 % cream, Apply topically 2 times daily Apply topically 2 times daily. , Disp: , Rfl:     fexofenadine (ALLEGRA) 180 MG tablet, Take 1 tablet by mouth daily, Disp: , Rfl:     albuterol sulfate HFA (PROVENTIL;VENTOLIN;PROAIR) 108 (90 Base) MCG/ACT inhaler, Inhale 2 puffs into the lungs 4 times daily as needed for Wheezing, Disp: 18 g, Rfl: 0    fluticasone (FLONASE) 50 MCG/ACT nasal spray, 2 sprays by Each Nostril route daily, Disp: 48 g, Rfl: 1    montelukast (SINGULAIR) 10 MG tablet, Take 1 tablet by mouth nightly, Disp: , Rfl:   Allergies   Allergen Reactions    Ciprofloxacin Swelling    Levofloxacin Other (See Comments)     pain    Penicillins Hives    Doxycycline Other (See Comments)     Vision problems       Past Medical History:   Diagnosis Date    Arthritis     Asthma     Deviated nasal septum     Diabetes mellitus ([address])

## 2023-11-16 ENCOUNTER — TELEPHONE (OUTPATIENT)
Dept: ENT CLINIC | Age: 58
End: 2023-11-16

## 2023-11-16 NOTE — TELEPHONE ENCOUNTER
Patient called into the office states yellow drainage and unable to irrigate one side of nose, patient will go to walk in clinic to make sure not a sinus infection and advised to continue saline along with lea pot and Flonase.

## 2023-11-20 RX ORDER — FLUTICASONE PROPIONATE AND SALMETEROL 50; 250 UG/1; UG/1
POWDER RESPIRATORY (INHALATION)
Qty: 60 EACH | Refills: 0 | Status: SHIPPED | OUTPATIENT
Start: 2023-11-20

## 2023-11-20 NOTE — TELEPHONE ENCOUNTER
Patients last appointment 10/9/2023.   Patients next scheduled appointment   Future Appointments   Date Time Provider [address]   12/6/2023  9:30 AM [facility]   12/19/2023 10:00 PM [facility] BEDRO[facility] SLEEP [place] HOD   1/8/2024  8:15 AM DO SAM De Jesus [facility]   1/16/2024  1:45 PM TIGIST Mitchell NP [facility]   1/24/2024  8:40 AM Sharon Wooten DO [facility]   2/9/2024  8:30 AM Sachi Aleman DPM [facility]   3/13/2024  8:15 AM Krishan Jacobs,  [address] E[facility]

## 2023-12-06 ENCOUNTER — OFFICE VISIT (OUTPATIENT)
Dept: ENT CLINIC | Age: 58
End: 2023-12-06
Payer: COMMERCIAL

## 2023-12-06 VITALS
DIASTOLIC BLOOD PRESSURE: 87 MMHG | SYSTOLIC BLOOD PRESSURE: 149 MMHG | BODY MASS INDEX: 41.75 KG/M2 | HEART RATE: 89 BPM | HEIGHT: 73 IN | WEIGHT: 315 LBS

## 2023-12-06 DIAGNOSIS — J30.9 ALLERGIC RHINITIS, UNSPECIFIED SEASONALITY, UNSPECIFIED TRIGGER: ICD-10-CM

## 2023-12-06 DIAGNOSIS — R09.81 NASAL CONGESTION: ICD-10-CM

## 2023-12-06 DIAGNOSIS — J34.89 NASAL CRUSTING: Primary | ICD-10-CM

## 2023-12-06 PROCEDURE — 99213 OFFICE O/P EST LOW 20 MIN: CPT | Performed by: NURSE PRACTITIONER

## 2023-12-06 PROCEDURE — G8482 FLU IMMUNIZE ORDER/ADMIN: HCPCS | Performed by: NURSE PRACTITIONER

## 2023-12-06 PROCEDURE — G8427 DOCREV CUR MEDS BY ELIG CLIN: HCPCS | Performed by: NURSE PRACTITIONER

## 2023-12-06 PROCEDURE — 1036F TOBACCO NON-USER: CPT | Performed by: NURSE PRACTITIONER

## 2023-12-06 PROCEDURE — G8417 CALC BMI ABV UP PARAM F/U: HCPCS | Performed by: NURSE PRACTITIONER

## 2023-12-06 PROCEDURE — 3017F COLORECTAL CA SCREEN DOC REV: CPT | Performed by: NURSE PRACTITIONER

## 2023-12-06 ASSESSMENT — ENCOUNTER SYMPTOMS
SINUS PAIN: 0
RHINORRHEA: 0
SORE THROAT: 0
RESPIRATORY NEGATIVE: 1
EYES NEGATIVE: 1
SHORTNESS OF BREATH: 0
STRIDOR: 0
SINUS PRESSURE: 0

## 2023-12-06 NOTE — PROGRESS NOTES
[facility]  Dr. Joanna Kurtz. Ms.Ed        Patient Name:  William Nagel  :  1965     CHIEF C/O:    Chief Complaint   Patient presents with    Follow-up     Pt states he is doing ok        HISTORY OBTAINED FROM:  patient    HISTORY OF PRESENT ILLNESS:       Princess Posadas is a 62y.o. year old male, here today for follow up of:       Chronic symptoms and nasal congestion. Patient was last seen 1 month ago by Dr. Beny Vega following functional endoscopic sinus surgery and septoplasty. He states he has been doing well since the surgery with improved airway but continues to have crusting and debris in the right nostril especially. He states this does cause mild congestion. He was restarted on Flonase by Dr. Beny Vega as well as daily nasal saline irrigations. He denies any sinus pain or pressure. He denies any recent fevers or recent antibiotics. He states overall he is doing well and is happy with the outcome of the surgery.            Past Medical History:   Diagnosis Date    Arthritis     Asthma     Deviated nasal septum     Diabetes mellitus ([address])     Hypertension     Prolonged emergence from general anesthesia      Past Surgical History:   Procedure Laterality Date    APPENDECTOMY      COLONOSCOPY N/A 04/15/2021    COLONOSCOPY WITH BIOPSY performed by Luis M Coleman DO at [address]     PILONIDAL CYST EXCISION      SINUS ENDOSCOPY Bilateral 2023    FUNCTIONAL ENDOSCOPIC SINUS SURGERY, SUBMUCOSAL RESECTION INFERIOR TURBINATES, SEPTOPLASTY performed by Dillon Brasher DO at [address] 04/15/2021    EGD ESOPHAGOGASTRODUODENOSCOPY DILATATION performed by Luis M Coleman DO at [facility] ENDOSCOPY       Current Outpatient Medications:     mupirocin (BACTROBAN) 2 % ointment, Mixed in nasal saline, 2 sprays each nostril twice daily for 14 days, Disp: 1 each, Rfl: 1    ADVAIR DISKUS 250-50 MCG/ACT AEPB diskus

## 2024-01-19 ENCOUNTER — OFFICE VISIT (OUTPATIENT)
Dept: ENT CLINIC | Age: 59
End: 2024-01-19
Payer: COMMERCIAL

## 2024-01-19 VITALS
BODY MASS INDEX: 41.75 KG/M2 | HEIGHT: 73 IN | HEART RATE: 103 BPM | SYSTOLIC BLOOD PRESSURE: 148 MMHG | DIASTOLIC BLOOD PRESSURE: 71 MMHG | WEIGHT: 315 LBS

## 2024-01-19 DIAGNOSIS — R09.81 NASAL CONGESTION: ICD-10-CM

## 2024-01-19 DIAGNOSIS — J34.89 NASAL CRUSTING: Primary | ICD-10-CM

## 2024-01-19 DIAGNOSIS — J32.0 CHRONIC MAXILLARY SINUSITIS: ICD-10-CM

## 2024-01-19 DIAGNOSIS — J34.89 NASAL CRUSTING: ICD-10-CM

## 2024-01-19 PROCEDURE — 1036F TOBACCO NON-USER: CPT | Performed by: NURSE PRACTITIONER

## 2024-01-19 PROCEDURE — 99213 OFFICE O/P EST LOW 20 MIN: CPT | Performed by: NURSE PRACTITIONER

## 2024-01-19 PROCEDURE — G8427 DOCREV CUR MEDS BY ELIG CLIN: HCPCS | Performed by: NURSE PRACTITIONER

## 2024-01-19 PROCEDURE — G8417 CALC BMI ABV UP PARAM F/U: HCPCS | Performed by: NURSE PRACTITIONER

## 2024-01-19 PROCEDURE — 3017F COLORECTAL CA SCREEN DOC REV: CPT | Performed by: NURSE PRACTITIONER

## 2024-01-19 PROCEDURE — G8482 FLU IMMUNIZE ORDER/ADMIN: HCPCS | Performed by: NURSE PRACTITIONER

## 2024-01-19 ASSESSMENT — ENCOUNTER SYMPTOMS
RESPIRATORY NEGATIVE: 1
SINUS PAIN: 0
STRIDOR: 0
SHORTNESS OF BREATH: 0
EYES NEGATIVE: 1
SINUS PRESSURE: 0
RHINORRHEA: 0
SORE THROAT: 0

## 2024-01-19 NOTE — PROGRESS NOTES
visit:    Nasal crusting  -     Culture, Nasal; Future    Chronic maxillary sinusitis    Nasal congestion      At this time patient will restart Bactroban nasal spray, 2 sprays each nostril twice daily until he is seen by Dr. Jacobs in 2 to 3 weeks.  A culture was also obtained from the drainage of his right [facility].  He will be notified of any medication changes based on culture results.  He will call for any new or worsening of symptoms prior to his appointment with Dr. Jacobs.        Bobby Sevilla MSN, FNP-C  [facility] - Ear, Nose [facility] Throat    The information contained in this note has been dictated using drug and medical speech recognition software and may contain errors

## 2024-01-21 LAB
CULTURE: ABNORMAL
SPECIMEN DESCRIPTION: ABNORMAL

## 2024-01-22 ENCOUNTER — OFFICE VISIT (OUTPATIENT)
Dept: PRIMARY CARE CLINIC | Age: 59
End: 2024-01-22
Payer: COMMERCIAL

## 2024-01-22 VITALS
SYSTOLIC BLOOD PRESSURE: 128 MMHG | BODY MASS INDEX: 41.75 KG/M2 | HEIGHT: 73 IN | HEART RATE: 100 BPM | DIASTOLIC BLOOD PRESSURE: 70 MMHG | TEMPERATURE: 98.2 F | WEIGHT: 315 LBS | OXYGEN SATURATION: 98 %

## 2024-01-22 DIAGNOSIS — M17.11 PRIMARY OSTEOARTHRITIS OF RIGHT KNEE: ICD-10-CM

## 2024-01-22 DIAGNOSIS — J45.41 MODERATE PERSISTENT REACTIVE AIRWAY DISEASE WITH ACUTE EXACERBATION: ICD-10-CM

## 2024-01-22 DIAGNOSIS — E29.1 HYPOTESTOSTERONEMIA IN MALE: ICD-10-CM

## 2024-01-22 DIAGNOSIS — E11.9 TYPE 2 DIABETES MELLITUS WITHOUT COMPLICATION, WITHOUT LONG-TERM CURRENT USE OF INSULIN (HCC): ICD-10-CM

## 2024-01-22 DIAGNOSIS — Z12.5 SCREENING PSA (PROSTATE SPECIFIC ANTIGEN): ICD-10-CM

## 2024-01-22 DIAGNOSIS — J34.2 DNS (DEVIATED NASAL SEPTUM): ICD-10-CM

## 2024-01-22 DIAGNOSIS — E66.01 MORBID OBESITY (HCC): ICD-10-CM

## 2024-01-22 DIAGNOSIS — I15.9 SECONDARY HYPERTENSION: Primary | ICD-10-CM

## 2024-01-22 DIAGNOSIS — I15.9 SECONDARY HYPERTENSION: ICD-10-CM

## 2024-01-22 DIAGNOSIS — K21.9 GASTROESOPHAGEAL REFLUX DISEASE, UNSPECIFIED WHETHER ESOPHAGITIS PRESENT: ICD-10-CM

## 2024-01-22 LAB
ABSOLUTE IMMATURE GRANULOCYTE: 0.1 K/UL (ref 0–0.58)
ALBUMIN SERPL-MCNC: 4 G/DL (ref 3.5–5.2)
ALP BLD-CCNC: 82 U/L (ref 40–129)
ALT SERPL-CCNC: 16 U/L (ref 0–40)
ANION GAP SERPL CALCULATED.3IONS-SCNC: 14 MMOL/L (ref 7–16)
AST SERPL-CCNC: 13 U/L (ref 0–39)
BASOPHILS ABSOLUTE: 0.06 K/UL (ref 0–0.2)
BASOPHILS RELATIVE PERCENT: 1 % (ref 0–2)
BILIRUB SERPL-MCNC: 0.4 MG/DL (ref 0–1.2)
BUN BLDV-MCNC: 12 MG/DL (ref 6–20)
CALCIUM SERPL-MCNC: 9.1 MG/DL (ref 8.6–10.2)
CHLORIDE BLD-SCNC: 99 MMOL/L (ref 98–107)
CHOLESTEROL: 189 MG/DL
CO2: 21 MMOL/L (ref 22–29)
CREAT SERPL-MCNC: 0.9 MG/DL (ref 0.7–1.2)
EOSINOPHILS ABSOLUTE: 0.34 K/UL (ref 0.05–0.5)
EOSINOPHILS RELATIVE PERCENT: 4 % (ref 0–6)
GFR SERPL CREATININE-BSD FRML MDRD: >60 ML/MIN/1.73M2
GLUCOSE BLD-MCNC: 125 MG/DL (ref 74–99)
HBA1C MFR BLD: 6.1 % (ref 4–5.6)
HCT VFR BLD CALC: 42.2 % (ref 37–54)
HDLC SERPL-MCNC: 34 MG/DL
HEMOGLOBIN: 13.6 G/DL (ref 12.5–16.5)
IMMATURE GRANULOCYTES: 1 % (ref 0–5)
LDL CHOLESTEROL: 108 MG/DL
LYMPHOCYTES ABSOLUTE: 1.68 K/UL (ref 1.5–4)
LYMPHOCYTES RELATIVE PERCENT: 22 % (ref 20–42)
MCH RBC QN AUTO: 28.9 PG (ref 26–35)
MCHC RBC AUTO-ENTMCNC: 32.2 G/DL (ref 32–34.5)
MCV RBC AUTO: 89.8 FL (ref 80–99.9)
MONOCYTES ABSOLUTE: 0.57 K/UL (ref 0.1–0.95)
MONOCYTES RELATIVE PERCENT: 7 % (ref 2–12)
NEUTROPHILS ABSOLUTE: 5.08 K/UL (ref 1.8–7.3)
NEUTROPHILS RELATIVE PERCENT: 65 % (ref 43–80)
PDW BLD-RTO: 14.6 % (ref 11.5–15)
PLATELET # BLD: 262 K/UL (ref 130–450)
PMV BLD AUTO: 9.9 FL (ref 7–12)
POTASSIUM SERPL-SCNC: 4.2 MMOL/L (ref 3.5–5)
PROSTATE SPECIFIC ANTIGEN: 1.92 NG/ML (ref 0–4)
RBC # BLD: 4.7 M/UL (ref 3.8–5.8)
SODIUM BLD-SCNC: 134 MMOL/L (ref 132–146)
TESTOSTERONE TOTAL: 240 NG/DL (ref 193–740)
TOTAL PROTEIN: 6.9 G/DL (ref 6.4–8.3)
TRIGL SERPL-MCNC: 233 MG/DL
VLDLC SERPL CALC-MCNC: 47 MG/DL
WBC # BLD: 7.8 K/UL (ref 4.5–11.5)

## 2024-01-22 PROCEDURE — G8417 CALC BMI ABV UP PARAM F/U: HCPCS | Performed by: FAMILY MEDICINE

## 2024-01-22 PROCEDURE — 2022F DILAT RTA XM EVC RTNOPTHY: CPT | Performed by: FAMILY MEDICINE

## 2024-01-22 PROCEDURE — 99214 OFFICE O/P EST MOD 30 MIN: CPT | Performed by: FAMILY MEDICINE

## 2024-01-22 PROCEDURE — 3017F COLORECTAL CA SCREEN DOC REV: CPT | Performed by: FAMILY MEDICINE

## 2024-01-22 PROCEDURE — 3046F HEMOGLOBIN A1C LEVEL >9.0%: CPT | Performed by: FAMILY MEDICINE

## 2024-01-22 PROCEDURE — 1036F TOBACCO NON-USER: CPT | Performed by: FAMILY MEDICINE

## 2024-01-22 PROCEDURE — G8427 DOCREV CUR MEDS BY ELIG CLIN: HCPCS | Performed by: FAMILY MEDICINE

## 2024-01-22 PROCEDURE — G8482 FLU IMMUNIZE ORDER/ADMIN: HCPCS | Performed by: FAMILY MEDICINE

## 2024-01-22 RX ORDER — PIOGLITAZONEHYDROCHLORIDE 30 MG/1
TABLET ORAL
Qty: 45 TABLET | Refills: 3 | Status: SHIPPED | OUTPATIENT
Start: 2024-01-22

## 2024-01-22 RX ORDER — GABAPENTIN 100 MG/1
100 CAPSULE ORAL NIGHTLY
Qty: 30 CAPSULE | Refills: 3 | Status: SHIPPED | OUTPATIENT
Start: 2024-01-22 | End: 2024-05-21

## 2024-01-22 RX ORDER — GABAPENTIN 100 MG/1
100 CAPSULE ORAL NIGHTLY
Qty: 30 CAPSULE | Refills: 0 | OUTPATIENT
Start: 2024-01-22

## 2024-01-22 RX ORDER — FLUTICASONE PROPIONATE AND SALMETEROL 250; 50 UG/1; UG/1
POWDER RESPIRATORY (INHALATION)
Qty: 60 EACH | Refills: 3 | Status: SHIPPED | OUTPATIENT
Start: 2024-01-22

## 2024-01-22 NOTE — PROGRESS NOTES
motion.      Cervical back: Normal range of motion and neck supple.   Skin:     General: Skin is warm and dry.   Neurological:      Mental Status: He is alert and oriented to person, place, and time.   Psychiatric:         Behavior: Behavior normal.        Vitals physical exam stable.  Meds as prescribed.  Additional lab studies will be reviewed and discussed when available.  Maintain current meds and care 3-month follow-up.          Plan Per Assessment:  Terrance was seen today for diabetes and medication refill.    Diagnoses and all orders for this visit:    Secondary hypertension  -     CBC with Auto Differential; Future  -     Comprehensive Metabolic Panel; Future  -     PSA Screening; Future  -     TESTOSTERONE; Future    Type 2 diabetes mellitus without complication, without long-term current use of insulin (HCC)  -     pioglitazone (ACTOS) 30 MG tablet; Take 1/2 (one-half) tablet by mouth once daily  -     CBC with Auto Differential; Future  -     Comprehensive Metabolic Panel; Future  -     PSA Screening; Future  -     TESTOSTERONE; Future    Primary osteoarthritis of right knee    Moderate persistent reactive airway disease with acute exacerbation    Morbid obesity (HCC)    Gastroesophageal reflux disease, unspecified whether esophagitis present    DNS (deviated nasal septum)    Hypotestosteronemia in male  -     TESTOSTERONE; Future    Screening PSA (prostate specific antigen)  -     PSA Screening; Future  -     TESTOSTERONE; Future    Other orders  -     gabapentin (NEURONTIN) 100 MG capsule; Take 1 capsule by mouth nightly for 120 days. Intended supply: 30 days  -     fluticasone-salmeterol (ADVAIR DISKUS) 250-50 MCG/ACT AEPB diskus inhaler; INHALE 1 DOSE BY MOUTH IN THE MORNING AND 1 IN THE EVENING            Return in about 3 months (around 4/22/2024).      Jaziel Zapata,     Note was generated with the assistance of voice recognition software.  Document was reviewed however may contain grammatical

## 2024-01-24 ENCOUNTER — OFFICE VISIT (OUTPATIENT)
Dept: ORTHOPEDIC SURGERY | Age: 59
End: 2024-01-24
Payer: COMMERCIAL

## 2024-01-24 DIAGNOSIS — M17.11 PRIMARY OSTEOARTHRITIS OF RIGHT KNEE: Primary | ICD-10-CM

## 2024-01-24 PROCEDURE — G8417 CALC BMI ABV UP PARAM F/U: HCPCS | Performed by: STUDENT IN AN ORGANIZED HEALTH CARE EDUCATION/TRAINING PROGRAM

## 2024-01-24 PROCEDURE — 99214 OFFICE O/P EST MOD 30 MIN: CPT | Performed by: STUDENT IN AN ORGANIZED HEALTH CARE EDUCATION/TRAINING PROGRAM

## 2024-01-24 PROCEDURE — 3017F COLORECTAL CA SCREEN DOC REV: CPT | Performed by: STUDENT IN AN ORGANIZED HEALTH CARE EDUCATION/TRAINING PROGRAM

## 2024-01-24 PROCEDURE — G8482 FLU IMMUNIZE ORDER/ADMIN: HCPCS | Performed by: STUDENT IN AN ORGANIZED HEALTH CARE EDUCATION/TRAINING PROGRAM

## 2024-01-24 PROCEDURE — G8427 DOCREV CUR MEDS BY ELIG CLIN: HCPCS | Performed by: STUDENT IN AN ORGANIZED HEALTH CARE EDUCATION/TRAINING PROGRAM

## 2024-01-24 PROCEDURE — 1036F TOBACCO NON-USER: CPT | Performed by: STUDENT IN AN ORGANIZED HEALTH CARE EDUCATION/TRAINING PROGRAM

## 2024-01-24 NOTE — PROGRESS NOTES
CHIEF COMPLAINT:   Chief Complaint   Patient presents with    Follow-up     F/u on rt knee injection, last injection was 10/23/23, he bl carpal tunnel he would like to discuss, he has some issues from a nasal surgery in November 2023        HPI update 1/24/2024: He is here today to follow-up for his right knee pain.  His most recent injection was in October.  Apparently this caused increased urination.  He has having some issues with nasal surgery that he had in November.    He is also complaining of bilateral carpal tunnel syndrome.  He had an EMG 1 year ago that showed severe carpal tunnel syndrome.  He has numbness and tingling in his median nerve distribution and it wakes him up from sleep at night.  He also complains of clumsiness.    He is having some infection issues from his sinus surgery that he is trying to get cleared up prior to any further treatment      HPI:    Terrance Bell is a 58 y.o. year old male referred here for right knee osteoarthritis.  He also complains of shin and thigh cramps.  He has a BMI of 55.  He has taken oral anti-inflammatories.  The pain is worse with ambulation and gets better with rest.  Celebrex does not appear to be helping.  Denies any recent injuries.  He did have a remote injury in 2009 which was soft tissue damage.  Denies fever and chills.  Denies numbness tingling.    PAST MEDICAL HISTORY  Past Medical History:   Diagnosis Date    Arthritis     Asthma     Deviated nasal septum     Diabetes mellitus (HCC)     Hypertension     Prolonged emergence from general anesthesia        PAST SURGICAL HISTORY  Past Surgical History:   Procedure Laterality Date    APPENDECTOMY      COLONOSCOPY N/A 04/15/2021    COLONOSCOPY WITH BIOPSY performed by Bird Brown DO at [facility] ENDOSCOPY    FOOT SURGERY Right     PILONIDAL CYST EXCISION      SINUS ENDOSCOPY Bilateral 11/1/2023    FUNCTIONAL ENDOSCOPIC SINUS SURGERY, SUBMUCOSAL RESECTION INFERIOR TURBINATES, SEPTOPLASTY performed by

## 2024-01-25 ENCOUNTER — HOSPITAL ENCOUNTER (OUTPATIENT)
Dept: SLEEP CENTER | Age: 59
Discharge: HOME | End: 2024-01-25
Payer: COMMERCIAL

## 2024-01-25 DIAGNOSIS — G47.30 SLEEP APNEA, UNSPECIFIED TYPE: ICD-10-CM

## 2024-01-25 PROCEDURE — 95800 SLP STDY UNATTENDED: CPT

## 2024-02-06 ENCOUNTER — OFFICE VISIT (OUTPATIENT)
Dept: ENT CLINIC | Age: 59
End: 2024-02-06
Payer: COMMERCIAL

## 2024-02-06 VITALS — HEIGHT: 73 IN | BODY MASS INDEX: 41.75 KG/M2 | WEIGHT: 315 LBS

## 2024-02-06 DIAGNOSIS — J32.0 CHRONIC MAXILLARY SINUSITIS: ICD-10-CM

## 2024-02-06 DIAGNOSIS — J34.89 NASAL CRUSTING: Primary | ICD-10-CM

## 2024-02-06 PROCEDURE — G8482 FLU IMMUNIZE ORDER/ADMIN: HCPCS | Performed by: OTOLARYNGOLOGY

## 2024-02-06 PROCEDURE — G8427 DOCREV CUR MEDS BY ELIG CLIN: HCPCS | Performed by: OTOLARYNGOLOGY

## 2024-02-06 PROCEDURE — 1036F TOBACCO NON-USER: CPT | Performed by: OTOLARYNGOLOGY

## 2024-02-06 PROCEDURE — G8417 CALC BMI ABV UP PARAM F/U: HCPCS | Performed by: OTOLARYNGOLOGY

## 2024-02-06 PROCEDURE — 3017F COLORECTAL CA SCREEN DOC REV: CPT | Performed by: OTOLARYNGOLOGY

## 2024-02-06 PROCEDURE — 99213 OFFICE O/P EST LOW 20 MIN: CPT | Performed by: OTOLARYNGOLOGY

## 2024-02-06 RX ORDER — AMOXICILLIN 500 MG/1
500 CAPSULE ORAL 2 TIMES DAILY
Qty: 20 CAPSULE | Refills: 0 | Status: SHIPPED | OUTPATIENT
Start: 2024-02-06 | End: 2024-02-16

## 2024-02-06 RX ORDER — PREDNISONE 10 MG/1
40 TABLET ORAL DAILY
Qty: 20 TABLET | Refills: 0 | Status: SHIPPED | OUTPATIENT
Start: 2024-02-06 | End: 2024-02-11

## 2024-02-06 ASSESSMENT — ENCOUNTER SYMPTOMS
RHINORRHEA: 0
ABDOMINAL PAIN: 0
COLOR CHANGE: 0
RESPIRATORY NEGATIVE: 1
GASTROINTESTINAL NEGATIVE: 1
SINUS PRESSURE: 0
SHORTNESS OF BREATH: 0
EYES NEGATIVE: 1

## 2024-02-06 NOTE — PROGRESS NOTES
Subjective:      Patient ID:  Terrance Bell is a 58 y.o. male.    HPI:  Patient presents today for recheck of his nose.  Condition has been present for 3 month(s). Pt has been having crusting still and feels he can't breath through he right side of his nose.     Pt was cultured and found to be staph hominis    Pt is using bactroban flush and doing ok.           Past Medical History:   Diagnosis Date    Arthritis     Asthma     Deviated nasal septum     Diabetes mellitus (HCC)     Hypertension     Prolonged emergence from general anesthesia      Past Surgical History:   Procedure Laterality Date    APPENDECTOMY      COLONOSCOPY N/A 04/15/2021    COLONOSCOPY WITH BIOPSY performed by Bird Brown DO at [facility] ENDOSCOPY    FOOT SURGERY Right     PILONIDAL CYST EXCISION      SINUS ENDOSCOPY Bilateral 2023    FUNCTIONAL ENDOSCOPIC SINUS SURGERY, SUBMUCOSAL RESECTION INFERIOR TURBINATES, SEPTOPLASTY performed by Bobby Jacobs DO at [facility] OR    TONSILLECTOMY      UPPER GASTROINTESTINAL ENDOSCOPY N/A 04/15/2021    EGD ESOPHAGOGASTRODUODENOSCOPY DILATATION performed by Bird Brown DO at [facility] ENDOSCOPY     Family History   Problem Relation Age of Onset    Cancer Mother     Diabetes Mother      Social History     Socioeconomic History    Marital status: Single     Spouse name: None    Number of children: 4    Years of education: 14    Highest education level: None   Occupational History     Employer: SELF-EMPLOYED   Tobacco Use    Smoking status: Former     Current packs/day: 0.00     Average packs/day: 2.0 packs/day for 17.9 years (35.8 ttl pk-yrs)     Types: Cigarettes     Start date:      Quit date: 1999     Years since quittin.1    Smokeless tobacco: Never   Vaping Use    Vaping Use: Never used   Substance and Sexual Activity    Alcohol use: No    Drug use: Not Currently     Social Determinants of Health     Financial Resource Strain: Low Risk  (2023)    Overall Financial Resource Strain

## 2024-02-20 ENCOUNTER — OFFICE VISIT (OUTPATIENT)
Dept: ENT CLINIC | Age: 59
End: 2024-02-20
Payer: COMMERCIAL

## 2024-02-20 VITALS — BODY MASS INDEX: 41.75 KG/M2 | WEIGHT: 315 LBS | HEIGHT: 73 IN

## 2024-02-20 DIAGNOSIS — R09.81 NASAL CONGESTION: ICD-10-CM

## 2024-02-20 DIAGNOSIS — J32.0 CHRONIC MAXILLARY SINUSITIS: ICD-10-CM

## 2024-02-20 DIAGNOSIS — J34.89 NASAL CRUSTING: Primary | ICD-10-CM

## 2024-02-20 PROCEDURE — 1036F TOBACCO NON-USER: CPT | Performed by: OTOLARYNGOLOGY

## 2024-02-20 PROCEDURE — G8482 FLU IMMUNIZE ORDER/ADMIN: HCPCS | Performed by: OTOLARYNGOLOGY

## 2024-02-20 PROCEDURE — 3017F COLORECTAL CA SCREEN DOC REV: CPT | Performed by: OTOLARYNGOLOGY

## 2024-02-20 PROCEDURE — G8417 CALC BMI ABV UP PARAM F/U: HCPCS | Performed by: OTOLARYNGOLOGY

## 2024-02-20 PROCEDURE — G8427 DOCREV CUR MEDS BY ELIG CLIN: HCPCS | Performed by: OTOLARYNGOLOGY

## 2024-02-20 PROCEDURE — 99212 OFFICE O/P EST SF 10 MIN: CPT | Performed by: OTOLARYNGOLOGY

## 2024-02-27 ENCOUNTER — OFFICE VISIT (OUTPATIENT)
Dept: FAMILY MEDICINE CLINIC | Age: 59
End: 2024-02-27
Payer: COMMERCIAL

## 2024-02-27 VITALS
HEIGHT: 73 IN | SYSTOLIC BLOOD PRESSURE: 138 MMHG | TEMPERATURE: 98.2 F | HEART RATE: 106 BPM | OXYGEN SATURATION: 95 % | DIASTOLIC BLOOD PRESSURE: 82 MMHG | WEIGHT: 315 LBS | BODY MASS INDEX: 41.75 KG/M2

## 2024-02-27 DIAGNOSIS — J06.9 VIRAL URI: Primary | ICD-10-CM

## 2024-02-27 DIAGNOSIS — R05.9 COUGH, UNSPECIFIED TYPE: ICD-10-CM

## 2024-02-27 LAB
INFLUENZA A ANTIBODY: NEGATIVE
INFLUENZA B ANTIBODY: NEGATIVE
Lab: NORMAL
PERFORMING INSTRUMENT: NORMAL
QC PASS/FAIL: NORMAL
SARS-COV-2, POC: NORMAL

## 2024-02-27 PROCEDURE — G8427 DOCREV CUR MEDS BY ELIG CLIN: HCPCS | Performed by: PHYSICIAN ASSISTANT

## 2024-02-27 PROCEDURE — 1036F TOBACCO NON-USER: CPT | Performed by: PHYSICIAN ASSISTANT

## 2024-02-27 PROCEDURE — 87804 INFLUENZA ASSAY W/OPTIC: CPT | Performed by: PHYSICIAN ASSISTANT

## 2024-02-27 PROCEDURE — G8417 CALC BMI ABV UP PARAM F/U: HCPCS | Performed by: PHYSICIAN ASSISTANT

## 2024-02-27 PROCEDURE — 87426 SARSCOV CORONAVIRUS AG IA: CPT | Performed by: PHYSICIAN ASSISTANT

## 2024-02-27 PROCEDURE — G8482 FLU IMMUNIZE ORDER/ADMIN: HCPCS | Performed by: PHYSICIAN ASSISTANT

## 2024-02-27 PROCEDURE — 3006F CXR DOC REV: CPT | Performed by: PHYSICIAN ASSISTANT

## 2024-02-27 PROCEDURE — 99214 OFFICE O/P EST MOD 30 MIN: CPT | Performed by: PHYSICIAN ASSISTANT

## 2024-02-27 PROCEDURE — 3017F COLORECTAL CA SCREEN DOC REV: CPT | Performed by: PHYSICIAN ASSISTANT

## 2024-02-27 NOTE — PROGRESS NOTES
evaluation. The patient has no other questions or concerns at this time the patient will be discharged home.      Clinical Impression:   Terrance was seen today for cough, congestion and generalized body aches.    Diagnoses and all orders for this visit:    Viral URI    Cough, unspecified type  -     POCT Influenza A/B  -     POCT COVID-19, Antigen  -     XR CHEST (2 VW); Future        The patient is to call for any concerns or return if any of the signs or symptoms worsen. The patient is to follow-up with PCP in the next 2-3 days for repeat evaluation repeat assessment or go directly to the emergency department.     SIGNATURE: Wilberto Beckford III, PA-C

## 2024-03-08 ENCOUNTER — OFFICE VISIT (OUTPATIENT)
Dept: PODIATRY | Age: 59
End: 2024-03-08
Payer: COMMERCIAL

## 2024-03-08 DIAGNOSIS — M20.41 HAMMER TOES OF BOTH FEET: ICD-10-CM

## 2024-03-08 DIAGNOSIS — G60.8 HEREDITARY SENSORY NEUROPATHY: ICD-10-CM

## 2024-03-08 DIAGNOSIS — M20.42 HAMMER TOES OF BOTH FEET: ICD-10-CM

## 2024-03-08 DIAGNOSIS — E11.9 TYPE 2 DIABETES MELLITUS WITHOUT COMPLICATION, UNSPECIFIED WHETHER LONG TERM INSULIN USE (HCC): Primary | ICD-10-CM

## 2024-03-08 DIAGNOSIS — B35.1 TINEA UNGUIUM: ICD-10-CM

## 2024-03-08 DIAGNOSIS — L84 CORNS AND CALLOSITIES: ICD-10-CM

## 2024-03-08 PROCEDURE — 99999 PR OFFICE/OUTPT VISIT,PROCEDURE ONLY: CPT | Performed by: PODIATRIST

## 2024-03-08 PROCEDURE — 11056 PARNG/CUTG B9 HYPRKR LES 2-4: CPT | Performed by: PODIATRIST

## 2024-03-08 PROCEDURE — 11721 DEBRIDE NAIL 6 OR MORE: CPT | Performed by: PODIATRIST

## 2024-03-08 NOTE — PROGRESS NOTES
Ciprofloxacin Swelling    Levofloxacin Other (See Comments)     pain    Penicillins Hives    Doxycycline Other (See Comments)     Vision problems       Past Medical History:   Diagnosis Date    Arthritis     Asthma     Deviated nasal septum     Diabetes mellitus (HCC)     Hypertension     Prolonged emergence from general anesthesia            There were no vitals filed for this visit.        Work History/Social History:   Foot and ankle history:     Focused Lower Extremity Physical Exam:    Neurovascular examination:    Dorsalis Pedis palpable bilateral.  Posterior tibialis palpable bilateral.    Capillary Refill Time:  Immediate return  Hair growth:  Symmetrical and bilateral   Skin:  Not atrophic  Edema: Mild edema bilateral feet or ankles.  Neurologic:  Light touch diminished bilateral.      Musculoskeletal/ Orthopedic examination:    Equinis: Absent bilateral  Dorsiflexion, plantarflexion, inversion, eversion bilateral 5 out of 5 muscle strength  Wiggling toes  Negative Homans  Flexible hammertoes 2 through 5 bilateral  No pain foot or ankle bilateral      Dermatology examination:    Toenails 1-5 right and left thickened, elongated, dystrophic, mycotic with subungual debris.  Webspaces 1-4 bilateral clean, dry and intact.   Hyperkeratotic tissue fifth metatarsal bilateral.    No erythema.  No open wounds.    Assessment and Plan:  Terrance was seen today for callouses, nail problem and diabetes.    Diagnoses and all orders for this visit:    Type 2 diabetes mellitus without complication, unspecified whether long term insulin use ([facility])  -     Diabetic Foot Exam    Tinea unguium    Hammer toes of both feet    Corns and callosities    Hereditary sensory neuropathy          No acute osseous abnormality.       Dorsal and plantar calcaneal spurs.       Follow-up diabetic foot ankle exam  History gabapentin  Manual debridement with standard technique toenails 1 through 5 right and left in thickness and length.  Patient

## 2024-03-13 ENCOUNTER — OFFICE VISIT (OUTPATIENT)
Dept: ENT CLINIC | Age: 59
End: 2024-03-13
Payer: COMMERCIAL

## 2024-03-13 VITALS — HEIGHT: 73 IN | BODY MASS INDEX: 41.75 KG/M2 | WEIGHT: 315 LBS

## 2024-03-13 DIAGNOSIS — J32.9 CHRONIC SINUSITIS, UNSPECIFIED LOCATION: Primary | ICD-10-CM

## 2024-03-13 LAB
A ALTERNATA IGE QN: <0.1 KU/L (ref 0–0.34)
A FUMIGATUS IGE QN: <0.1 KU/L (ref 0–0.34)
ALLERGEN BIRCH IGE: <0.1 KU/L (ref 0–0.34)
BERMUDA GRASS IGE QN: <0.1 KU/L (ref 0–0.34)
BOXELDER IGE QN: <0.1 KU/L (ref 0–0.34)
C HERBARUM IGE QN: <0.1 KUL/L (ref 0–0.34)
CALIF WALNUT POLN IGE QN: <0.1 KU/L (ref 0–0.34)
CAT DANDER IGE QN: <0.1 KU/L (ref 0–0.34)
CMN PIGWEED IGE QN: <0.1 KU/L (ref 0–0.34)
COMMON RAGWEED IGE QN: <0.1 KU/L (ref 0–0.34)
COTTONWOOD IGE QN: <0.1 KU/L (ref 0–0.34)
D FARINAE IGE QN: <0.1 KU/L (ref 0–0.34)
D PTERONYSS IGE QN: <0.1 KU/L (ref 0–0.34)
DOG DANDER IGE QN: <0.1 KU/L (ref 0–0.34)
IGE SERPL-ACNC: 8 IU/ML (ref 0–100)
LONDON PLANE IGE QN: <0.1 KU/L (ref 0–0.34)
M RACEMOSUS IGE QN: <0.1 KU/L (ref 0–0.34)
MOUSE EPITH IGE QN: <0.1 KU/L (ref 0–0.34)
MT JUNIPER IGE QN: <0.1 KU/L (ref 0–0.34)
P NOTATUM IGE QN: <0.1 KU/L (ref 0–0.34)
PECAN/HICK TREE IGE QN: <0.1 KU/L (ref 0–0.34)
ROACH IGE QN: <0.1 KU/L (ref 0–0.34)
SALTWORT IGE QN: <0.1 KU/L (ref 0–0.34)
SHEEP SORREL IGE QN: <0.1 KU/L (ref 0–0.34)
TIMOTHY IGE QN: <0.1 KU/L (ref 0–0.34)
WHITE ASH IGE QN: <0.1 KU/L (ref 0–0.34)
WHITE ELM IGE QN: <0.1 KU/L (ref 0–0.34)
WHITE MULBERRY IGE QN: <0.1 KU/L (ref 0–0.34)
WHITE OAK IGE QN: <0.1 KU/L (ref 0–0.34)

## 2024-03-13 PROCEDURE — G8482 FLU IMMUNIZE ORDER/ADMIN: HCPCS | Performed by: OTOLARYNGOLOGY

## 2024-03-13 PROCEDURE — 99213 OFFICE O/P EST LOW 20 MIN: CPT | Performed by: OTOLARYNGOLOGY

## 2024-03-13 PROCEDURE — G8427 DOCREV CUR MEDS BY ELIG CLIN: HCPCS | Performed by: OTOLARYNGOLOGY

## 2024-03-13 PROCEDURE — 3017F COLORECTAL CA SCREEN DOC REV: CPT | Performed by: OTOLARYNGOLOGY

## 2024-03-13 PROCEDURE — 1036F TOBACCO NON-USER: CPT | Performed by: OTOLARYNGOLOGY

## 2024-03-13 PROCEDURE — G8417 CALC BMI ABV UP PARAM F/U: HCPCS | Performed by: OTOLARYNGOLOGY

## 2024-03-13 RX ORDER — AMOXICILLIN AND CLAVULANATE POTASSIUM 875; 125 MG/1; MG/1
1 TABLET, FILM COATED ORAL 2 TIMES DAILY
Qty: 20 TABLET | Refills: 0 | Status: SHIPPED
Start: 2024-03-13 | End: 2024-03-13 | Stop reason: CLARIF

## 2024-03-13 NOTE — PROGRESS NOTES
quittin.2    Smokeless tobacco: Never   Vaping Use    Vaping Use: Never used   Substance and Sexual Activity    Alcohol use: No    Drug use: Not Currently     Social Determinants of Health     Financial Resource Strain: Low Risk  (2023)    Overall Financial Resource Strain (CARDIA)     Difficulty of Paying Living Expenses: Not hard at all   Transportation Needs: Unknown (2023)    PRAPARE - Transportation     Lack of Transportation (Non-Medical): No   Physical Activity: Unknown (10/22/2023)    Exercise Vital Sign     Days of Exercise per Week: Patient declined   Intimate Partner Violence: Not At Risk (10/22/2023)    Humiliation, Afraid, Rape, and Kick questionnaire     Fear of Current or Ex-Partner: No     Emotionally Abused: No     Physically Abused: No     Sexually Abused: No   Housing Stability: Unknown (2023)    Housing Stability Vital Sign     Unstable Housing in the Last Year: No     Allergies   Allergen Reactions    Ciprofloxacin Swelling    Levofloxacin Other (See Comments)     pain    Doxycycline Other (See Comments)     Vision problems       Review of Systems   Constitutional: Negative.  Negative for fever.   HENT:  Positive for congestion, postnasal drip, rhinorrhea, sinus pressure and sinus pain. Negative for sneezing.    Eyes: Negative.  Negative for visual disturbance.   Respiratory: Negative.  Negative for shortness of breath.    Cardiovascular: Negative.  Negative for chest pain.   Gastrointestinal: Negative.  Negative for abdominal pain.   Genitourinary: Negative.    Musculoskeletal: Negative.    Skin: Negative.  Negative for color change.   Allergic/Immunologic: Positive for environmental allergies.   Neurological: Negative.    Psychiatric/Behavioral: Negative.  Negative for behavioral problems and hallucinations.    All other systems reviewed and are negative.              Objective:     There were no vitals filed for this visit.    Physical Exam  Vitals and nursing note

## 2024-04-02 ENCOUNTER — OFFICE VISIT (OUTPATIENT)
Dept: FAMILY MEDICINE CLINIC | Age: 59
End: 2024-04-02
Payer: COMMERCIAL

## 2024-04-02 VITALS
OXYGEN SATURATION: 96 % | WEIGHT: 315 LBS | DIASTOLIC BLOOD PRESSURE: 80 MMHG | HEIGHT: 73 IN | SYSTOLIC BLOOD PRESSURE: 130 MMHG | TEMPERATURE: 99.4 F | BODY MASS INDEX: 41.75 KG/M2 | HEART RATE: 106 BPM

## 2024-04-02 DIAGNOSIS — R05.1 ACUTE COUGH: Primary | ICD-10-CM

## 2024-04-02 DIAGNOSIS — R09.82 POSTNASAL DRIP: ICD-10-CM

## 2024-04-02 DIAGNOSIS — R09.81 NASAL CONGESTION: ICD-10-CM

## 2024-04-02 LAB
INFLUENZA A ANTIBODY: NORMAL
INFLUENZA B ANTIBODY: NORMAL
Lab: NORMAL
PERFORMING INSTRUMENT: NORMAL
QC PASS/FAIL: NORMAL
SARS-COV-2, POC: NORMAL

## 2024-04-02 PROCEDURE — 87804 INFLUENZA ASSAY W/OPTIC: CPT | Performed by: FAMILY MEDICINE

## 2024-04-02 PROCEDURE — 1036F TOBACCO NON-USER: CPT | Performed by: FAMILY MEDICINE

## 2024-04-02 PROCEDURE — 87426 SARSCOV CORONAVIRUS AG IA: CPT | Performed by: FAMILY MEDICINE

## 2024-04-02 PROCEDURE — 3017F COLORECTAL CA SCREEN DOC REV: CPT | Performed by: FAMILY MEDICINE

## 2024-04-02 PROCEDURE — G8417 CALC BMI ABV UP PARAM F/U: HCPCS | Performed by: FAMILY MEDICINE

## 2024-04-02 PROCEDURE — G8427 DOCREV CUR MEDS BY ELIG CLIN: HCPCS | Performed by: FAMILY MEDICINE

## 2024-04-02 PROCEDURE — 99213 OFFICE O/P EST LOW 20 MIN: CPT | Performed by: FAMILY MEDICINE

## 2024-04-02 RX ORDER — AMOXICILLIN AND CLAVULANATE POTASSIUM 875; 125 MG/1; MG/1
1 TABLET, FILM COATED ORAL 2 TIMES DAILY
Qty: 20 TABLET | Refills: 0 | Status: SHIPPED
Start: 2024-04-02 | End: 2024-04-02

## 2024-04-02 RX ORDER — PREDNISONE 5 MG/1
TABLET ORAL
Qty: 30 TABLET | Refills: 0 | Status: SHIPPED | OUTPATIENT
Start: 2024-04-02

## 2024-04-02 RX ORDER — AMOXICILLIN 500 MG/1
500 CAPSULE ORAL 2 TIMES DAILY
Qty: 20 CAPSULE | Refills: 0 | Status: SHIPPED | OUTPATIENT
Start: 2024-04-02 | End: 2024-04-12

## 2024-04-02 ASSESSMENT — ENCOUNTER SYMPTOMS
GASTROINTESTINAL NEGATIVE: 1
EYES NEGATIVE: 1
SINUS PRESSURE: 1
COUGH: 1
SINUS PAIN: 1
ALLERGIC/IMMUNOLOGIC NEGATIVE: 1

## 2024-04-02 NOTE — PROGRESS NOTES
(SINGULAIR) 10 MG tablet Take 1 tablet by mouth nightly    Provider, MD Richard          HPI: Patient evaluated today respiratory cough congestion nasal drainage.  Denies fever or chills.  COVID testing negative.  Findings consistent with URI sinus.          Review of Systems   Constitutional: Negative.    HENT:  Positive for congestion, sinus pressure and sinus pain.    Eyes: Negative.    Respiratory:  Positive for cough.    Gastrointestinal: Negative.    Endocrine: Negative.    Genitourinary: Negative.    Musculoskeletal: Negative.    Skin: Negative.    Allergic/Immunologic: Negative.    Neurological: Negative.    Hematological: Negative.    Psychiatric/Behavioral: Negative.                Current Outpatient Medications:     predniSONE (DELTASONE) 5 MG tablet, 4 tablets daily for 3 days then 3 tablets daily for 3 days then 2 tablets daily for 3 days then 1 tablet daily for 3 days, Disp: 30 tablet, Rfl: 0    amoxicillin (AMOXIL) 500 MG capsule, Take 1 capsule by mouth 2 times daily for 10 days, Disp: 20 capsule, Rfl: 0    diclofenac sodium (VOLTAREN) 1 % GEL, Apply 4 g topically 4 times daily, Disp: 350 g, Rfl: 0    gabapentin (NEURONTIN) 100 MG capsule, Take 1 capsule by mouth nightly for 120 days. Intended supply: 30 days, Disp: 30 capsule, Rfl: 3    pioglitazone (ACTOS) 30 MG tablet, Take 1/2 (one-half) tablet by mouth once daily, Disp: 45 tablet, Rfl: 3    fluticasone-salmeterol (ADVAIR DISKUS) 250-50 MCG/ACT AEPB diskus inhaler, INHALE 1 DOSE BY MOUTH IN THE MORNING AND 1 IN THE EVENING, Disp: 60 each, Rfl: 3    mupirocin (BACTROBAN) 2 % ointment, Mixed in nasal saline, 2 sprays each nostril twice daily for 14 days, Disp: 1 each, Rfl: 1    lisinopril (PRINIVIL;ZESTRIL) 40 MG tablet, 1 qd, Disp: 90 tablet, Rfl: 3    ASPIRIN 81 PO, Take by mouth daily, Disp: , Rfl:     ciclopirox (LOPROX) 0.77 % cream, Apply topically 2 times daily Apply topically 2 times daily., Disp: , Rfl:     fexofenadine (ALLEGRA) 180

## 2024-04-16 ENCOUNTER — OFFICE VISIT (OUTPATIENT)
Dept: ENT CLINIC | Age: 59
End: 2024-04-16
Payer: COMMERCIAL

## 2024-04-16 VITALS
WEIGHT: 315 LBS | HEART RATE: 65 BPM | SYSTOLIC BLOOD PRESSURE: 136 MMHG | BODY MASS INDEX: 41.75 KG/M2 | HEIGHT: 73 IN | DIASTOLIC BLOOD PRESSURE: 80 MMHG

## 2024-04-16 DIAGNOSIS — Z98.890 S/P FESS (FUNCTIONAL ENDOSCOPIC SINUS SURGERY): ICD-10-CM

## 2024-04-16 DIAGNOSIS — J30.9 ALLERGIC RHINITIS, UNSPECIFIED SEASONALITY, UNSPECIFIED TRIGGER: ICD-10-CM

## 2024-04-16 DIAGNOSIS — R09.82 POST-NASAL DRAINAGE: ICD-10-CM

## 2024-04-16 DIAGNOSIS — J32.9 CHRONIC SINUSITIS, UNSPECIFIED LOCATION: ICD-10-CM

## 2024-04-16 DIAGNOSIS — J32.0 CHRONIC MAXILLARY SINUSITIS: ICD-10-CM

## 2024-04-16 DIAGNOSIS — H60.8X3 CHRONIC ECZEMATOUS OTITIS EXTERNA OF BOTH EARS: Primary | ICD-10-CM

## 2024-04-16 DIAGNOSIS — R09.81 NASAL CONGESTION: ICD-10-CM

## 2024-04-16 DIAGNOSIS — J32.4 CHRONIC PANSINUSITIS: ICD-10-CM

## 2024-04-16 DIAGNOSIS — J32.9 RECURRENT SINUS INFECTIONS: ICD-10-CM

## 2024-04-16 PROCEDURE — 3017F COLORECTAL CA SCREEN DOC REV: CPT | Performed by: OTOLARYNGOLOGY

## 2024-04-16 PROCEDURE — G8427 DOCREV CUR MEDS BY ELIG CLIN: HCPCS | Performed by: OTOLARYNGOLOGY

## 2024-04-16 PROCEDURE — G8417 CALC BMI ABV UP PARAM F/U: HCPCS | Performed by: OTOLARYNGOLOGY

## 2024-04-16 PROCEDURE — 1036F TOBACCO NON-USER: CPT | Performed by: OTOLARYNGOLOGY

## 2024-04-16 PROCEDURE — 99214 OFFICE O/P EST MOD 30 MIN: CPT | Performed by: OTOLARYNGOLOGY

## 2024-04-16 NOTE — PROGRESS NOTES
Subjective:      Patient ID:  Terrance Bell is a 58 y.o. male.    HPI:  Patient presents today for recheck of his nose.  Experienced an episode of sinusitis requiring antibiotics (amox & prednisone) a couple weeks ago. Currently on Flonase and saline. Using saline and peroxide rinses. Uses Astepro occasionally.     Had FESS, Septo, SMRIT in 2023. He does report that he moves air better. He follows with an Allergist and had recent testing which was normal. Not on any immunotherapy              Past Medical History:   Diagnosis Date    Arthritis     Asthma     Deviated nasal septum     Diabetes mellitus (HCC)     Hypertension     Prolonged emergence from general anesthesia      Past Surgical History:   Procedure Laterality Date    APPENDECTOMY      COLONOSCOPY N/A 04/15/2021    COLONOSCOPY WITH BIOPSY performed by Bird Brown DO at [facility] ENDOSCOPY    FOOT SURGERY Right     PILONIDAL CYST EXCISION      SINUS ENDOSCOPY Bilateral 2023    FUNCTIONAL ENDOSCOPIC SINUS SURGERY, SUBMUCOSAL RESECTION INFERIOR TURBINATES, SEPTOPLASTY performed by Bobby Jcaobs DO at [facility] OR    TONSILLECTOMY      UPPER GASTROINTESTINAL ENDOSCOPY N/A 04/15/2021    EGD ESOPHAGOGASTRODUODENOSCOPY DILATATION performed by Bird Brown DO at [facility] ENDOSCOPY     Family History   Problem Relation Age of Onset    Cancer Mother     Diabetes Mother      Social History     Socioeconomic History    Marital status: Single     Spouse name: None    Number of children: 4    Years of education: 14    Highest education level: None   Occupational History     Employer: SELF-EMPLOYED   Tobacco Use    Smoking status: Former     Current packs/day: 0.00     Average packs/day: 2.0 packs/day for 17.9 years (35.8 ttl pk-yrs)     Types: Cigarettes     Start date:      Quit date: 1999     Years since quittin.3    Smokeless tobacco: Never   Vaping Use    Vaping Use: Never used   Substance and Sexual Activity    Alcohol use: No    Drug use:

## 2024-04-17 ENCOUNTER — TELEPHONE (OUTPATIENT)
Dept: ENT CLINIC | Age: 59
End: 2024-04-17

## 2024-04-17 NOTE — TELEPHONE ENCOUNTER
[facility] to authorize order with patient insurance. Patient is scheduled for CT Sinus with radiology on 4/29/24 @ 12:30pm. Patient has been notified of date and time and that they need to arrive at 12:00pm. Patient was informed he has no prep prior to procedure. Patient instructed to park in [facility] parking lot and report to registration. Patient verbalized understanding.    Electronically signed by Catia Molina MA on 4/17/24 at 8:47 AM EDT

## 2024-04-22 ENCOUNTER — OFFICE VISIT (OUTPATIENT)
Dept: PRIMARY CARE CLINIC | Age: 59
End: 2024-04-22
Payer: COMMERCIAL

## 2024-04-22 VITALS
HEIGHT: 73 IN | WEIGHT: 315 LBS | OXYGEN SATURATION: 99 % | BODY MASS INDEX: 41.75 KG/M2 | DIASTOLIC BLOOD PRESSURE: 80 MMHG | TEMPERATURE: 98.3 F | SYSTOLIC BLOOD PRESSURE: 126 MMHG | HEART RATE: 100 BPM

## 2024-04-22 DIAGNOSIS — I15.9 SECONDARY HYPERTENSION: ICD-10-CM

## 2024-04-22 DIAGNOSIS — E11.9 TYPE 2 DIABETES MELLITUS WITHOUT COMPLICATION, WITHOUT LONG-TERM CURRENT USE OF INSULIN (HCC): ICD-10-CM

## 2024-04-22 DIAGNOSIS — E66.01 MORBID OBESITY (HCC): Primary | ICD-10-CM

## 2024-04-22 DIAGNOSIS — E29.1 HYPOTESTOSTERONEMIA IN MALE: ICD-10-CM

## 2024-04-22 PROCEDURE — G8427 DOCREV CUR MEDS BY ELIG CLIN: HCPCS | Performed by: FAMILY MEDICINE

## 2024-04-22 PROCEDURE — 99214 OFFICE O/P EST MOD 30 MIN: CPT | Performed by: FAMILY MEDICINE

## 2024-04-22 PROCEDURE — 3017F COLORECTAL CA SCREEN DOC REV: CPT | Performed by: FAMILY MEDICINE

## 2024-04-22 PROCEDURE — 2022F DILAT RTA XM EVC RTNOPTHY: CPT | Performed by: FAMILY MEDICINE

## 2024-04-22 PROCEDURE — 1036F TOBACCO NON-USER: CPT | Performed by: FAMILY MEDICINE

## 2024-04-22 PROCEDURE — 3044F HG A1C LEVEL LT 7.0%: CPT | Performed by: FAMILY MEDICINE

## 2024-04-22 PROCEDURE — G8417 CALC BMI ABV UP PARAM F/U: HCPCS | Performed by: FAMILY MEDICINE

## 2024-04-22 RX ORDER — FLUTICASONE PROPIONATE AND SALMETEROL 250; 50 UG/1; UG/1
POWDER RESPIRATORY (INHALATION)
Qty: 60 EACH | Refills: 3 | Status: SHIPPED | OUTPATIENT
Start: 2024-04-22

## 2024-04-22 RX ORDER — GABAPENTIN 100 MG/1
100 CAPSULE ORAL NIGHTLY
Qty: 30 CAPSULE | Refills: 3 | Status: SHIPPED | OUTPATIENT
Start: 2024-04-22 | End: 2024-08-20

## 2024-04-22 RX ORDER — LISINOPRIL 40 MG/1
TABLET ORAL
Qty: 90 TABLET | Refills: 3 | Status: SHIPPED | OUTPATIENT
Start: 2024-04-22

## 2024-04-22 RX ORDER — PIOGLITAZONEHYDROCHLORIDE 30 MG/1
TABLET ORAL
Qty: 45 TABLET | Refills: 3 | Status: SHIPPED | OUTPATIENT
Start: 2024-04-22

## 2024-04-22 RX ORDER — AZELASTINE HCL 205.5 UG/1
SPRAY NASAL
COMMUNITY

## 2024-04-22 SDOH — ECONOMIC STABILITY: INCOME INSECURITY: HOW HARD IS IT FOR YOU TO PAY FOR THE VERY BASICS LIKE FOOD, HOUSING, MEDICAL CARE, AND HEATING?: PATIENT DECLINED

## 2024-04-22 SDOH — ECONOMIC STABILITY: FOOD INSECURITY: WITHIN THE PAST 12 MONTHS, THE FOOD YOU BOUGHT JUST DIDN'T LAST AND YOU DIDN'T HAVE MONEY TO GET MORE.: PATIENT DECLINED

## 2024-04-22 SDOH — ECONOMIC STABILITY: HOUSING INSECURITY
IN THE LAST 12 MONTHS, WAS THERE A TIME WHEN YOU DID NOT HAVE A STEADY PLACE TO SLEEP OR SLEPT IN A SHELTER (INCLUDING NOW)?: PATIENT DECLINED

## 2024-04-22 SDOH — ECONOMIC STABILITY: FOOD INSECURITY: WITHIN THE PAST 12 MONTHS, YOU WORRIED THAT YOUR FOOD WOULD RUN OUT BEFORE YOU GOT MONEY TO BUY MORE.: PATIENT DECLINED

## 2024-04-22 ASSESSMENT — ENCOUNTER SYMPTOMS
RESPIRATORY NEGATIVE: 1
ALLERGIC/IMMUNOLOGIC NEGATIVE: 1
EYES NEGATIVE: 1
GASTROINTESTINAL NEGATIVE: 1

## 2024-04-22 ASSESSMENT — PATIENT HEALTH QUESTIONNAIRE - PHQ9
SUM OF ALL RESPONSES TO PHQ QUESTIONS 1-9: 0
SUM OF ALL RESPONSES TO PHQ QUESTIONS 1-9: 0
2. FEELING DOWN, DEPRESSED OR HOPELESS: NOT AT ALL
1. LITTLE INTEREST OR PLEASURE IN DOING THINGS: NOT AT ALL
SUM OF ALL RESPONSES TO PHQ QUESTIONS 1-9: 0
SUM OF ALL RESPONSES TO PHQ QUESTIONS 1-9: 0
SUM OF ALL RESPONSES TO PHQ9 QUESTIONS 1 & 2: 0

## 2024-04-22 NOTE — PROGRESS NOTES
24     Terrance Bell    : 1965 Sex: male   Age: 58 y.o.      Chief Complaint   Patient presents with    Hypertension    Medication Refill       Prior to Admission medications    Medication Sig Start Date End Date Taking? Authorizing Provider   azelastine HCl (ASTEPRO) 0.15 % SOLN by Nasal route   Yes ProviderRichard MD   fluticasone-salmeterol (ADVAIR DISKUS) 250-50 MCG/ACT AEPB diskus inhaler INHALE 1 DOSE BY MOUTH IN THE MORNING AND 1 IN THE EVENING 24  Yes Jaziel Zapata DO   gabapentin (NEURONTIN) 100 MG capsule Take 1 capsule by mouth nightly for 120 days. Intended supply: 30 days 24 Yes Jaziel Zapata DO   lisinopril (PRINIVIL;ZESTRIL) 40 MG tablet 1 qd 24  Yes Jaziel Zapata DO   pioglitazone (ACTOS) 30 MG tablet Take 1/2 (one-half) tablet by mouth once daily 24  Yes Jaziel Zapata DO   Semaglutide,0.25 or 0.5MG/DOS, 2 MG/1.5ML SOPN 0.25 mg q week 24  Yes Jaziel Zapata DO   diclofenac sodium (VOLTAREN) 1 % GEL Apply 4 g topically 4 times daily 24  Yes Davon Wu,    mupirocin (BACTROBAN) 2 % ointment Mixed in nasal saline, 2 sprays each nostril twice daily for 14 days 23  Yes Bobby Sevilla APRN - CNP   ASPIRIN 81 PO Take by mouth daily   Yes ProviderRichard MD   ciclopirox (LOPROX) 0.77 % cream Apply topically 2 times daily Apply topically 2 times daily.   Yes ProviderRichard MD   albuterol sulfate HFA (PROVENTIL;VENTOLIN;PROAIR) 108 (90 Base) MCG/ACT inhaler Inhale 2 puffs into the lungs 4 times daily as needed for Wheezing 22  Yes Wilberto Beckford III, PA   fluticasone (FLONASE) 50 MCG/ACT nasal spray 2 sprays by Each Nostril route daily 22  Yes [place], Bobby S, APRN - CNP   montelukast (SINGULAIR) 10 MG tablet Take 1 tablet by mouth nightly   Yes Provider, Richard, MD          HPI: Patient seen today with hypertension diabetes obesity low testosterone feel the testosterone deficiency is

## 2024-04-26 LAB — DIABETIC RETINOPATHY: NEGATIVE

## 2024-04-29 ENCOUNTER — HOSPITAL ENCOUNTER (OUTPATIENT)
Dept: CT IMAGING | Age: 59
Discharge: HOME | End: 2024-05-01
Payer: COMMERCIAL

## 2024-04-29 DIAGNOSIS — J32.9 CHRONIC SINUSITIS, UNSPECIFIED LOCATION: ICD-10-CM

## 2024-04-29 DIAGNOSIS — J32.9 RECURRENT SINUS INFECTIONS: ICD-10-CM

## 2024-04-29 PROCEDURE — 70486 CT MAXILLOFACIAL W/O DYE: CPT

## 2024-05-01 ENCOUNTER — TELEPHONE (OUTPATIENT)
Dept: PRIMARY CARE CLINIC | Age: 59
End: 2024-05-01

## 2024-05-02 ENCOUNTER — HOSPITAL ENCOUNTER (OUTPATIENT)
Dept: SLEEP CENTER | Age: 59
Discharge: HOME | End: 2024-05-02
Payer: COMMERCIAL

## 2024-05-02 DIAGNOSIS — G47.33 OSA (OBSTRUCTIVE SLEEP APNEA): ICD-10-CM

## 2024-05-02 PROCEDURE — 95811 POLYSOM 6/>YRS CPAP 4/> PARM: CPT

## 2024-05-03 VITALS
OXYGEN SATURATION: 97 % | BODY MASS INDEX: 41.75 KG/M2 | HEIGHT: 73 IN | DIASTOLIC BLOOD PRESSURE: 84 MMHG | HEART RATE: 75 BPM | WEIGHT: 315 LBS | SYSTOLIC BLOOD PRESSURE: 138 MMHG

## 2024-05-03 ASSESSMENT — SLEEP AND FATIGUE QUESTIONNAIRES
HOW LIKELY ARE YOU TO NOD OFF OR FALL ASLEEP WHILE SITTING AND READING: WOULD NEVER DOZE
ESS TOTAL SCORE: 1
HOW LIKELY ARE YOU TO NOD OFF OR FALL ASLEEP WHILE LYING DOWN TO REST IN THE AFTERNOON WHEN CIRCUMSTANCES PERMIT: SLIGHT CHANCE OF DOZING
HOW LIKELY ARE YOU TO NOD OFF OR FALL ASLEEP WHILE SITTING QUIETLY AFTER LUNCH WITHOUT ALCOHOL: WOULD NEVER DOZE
HOW LIKELY ARE YOU TO NOD OFF OR FALL ASLEEP WHILE WATCHING TV: WOULD NEVER DOZE
HOW LIKELY ARE YOU TO NOD OFF OR FALL ASLEEP WHILE SITTING AND TALKING TO SOMEONE: WOULD NEVER DOZE
HOW LIKELY ARE YOU TO NOD OFF OR FALL ASLEEP IN A CAR, WHILE STOPPED FOR A FEW MINUTES IN TRAFFIC: WOULD NEVER DOZE
HOW LIKELY ARE YOU TO NOD OFF OR FALL ASLEEP WHILE SITTING INACTIVE IN A PUBLIC PLACE: WOULD NEVER DOZE
HOW LIKELY ARE YOU TO NOD OFF OR FALL ASLEEP WHEN YOU ARE A PASSENGER IN A CAR FOR AN HOUR WITHOUT A BREAK: WOULD NEVER DOZE

## 2024-05-21 ENCOUNTER — OFFICE VISIT (OUTPATIENT)
Dept: FAMILY MEDICINE CLINIC | Age: 59
End: 2024-05-21
Payer: COMMERCIAL

## 2024-05-21 VITALS
WEIGHT: 315 LBS | SYSTOLIC BLOOD PRESSURE: 130 MMHG | HEIGHT: 73 IN | TEMPERATURE: 98.6 F | DIASTOLIC BLOOD PRESSURE: 78 MMHG | HEART RATE: 93 BPM | BODY MASS INDEX: 41.75 KG/M2 | OXYGEN SATURATION: 97 %

## 2024-05-21 DIAGNOSIS — M79.89 LEG SWELLING: Primary | ICD-10-CM

## 2024-05-21 PROCEDURE — G8417 CALC BMI ABV UP PARAM F/U: HCPCS | Performed by: PHYSICIAN ASSISTANT

## 2024-05-21 PROCEDURE — 1036F TOBACCO NON-USER: CPT | Performed by: PHYSICIAN ASSISTANT

## 2024-05-21 PROCEDURE — 99215 OFFICE O/P EST HI 40 MIN: CPT | Performed by: PHYSICIAN ASSISTANT

## 2024-05-21 PROCEDURE — G8427 DOCREV CUR MEDS BY ELIG CLIN: HCPCS | Performed by: PHYSICIAN ASSISTANT

## 2024-05-21 PROCEDURE — 3017F COLORECTAL CA SCREEN DOC REV: CPT | Performed by: PHYSICIAN ASSISTANT

## 2024-05-21 RX ORDER — FUROSEMIDE 20 MG/1
20 TABLET ORAL DAILY
Qty: 7 TABLET | Refills: 0 | Status: SHIPPED | OUTPATIENT
Start: 2024-05-21 | End: 2024-05-28

## 2024-05-21 RX ORDER — SEMAGLUTIDE 0.68 MG/ML
3 INJECTION, SOLUTION SUBCUTANEOUS WEEKLY
COMMUNITY
Start: 2024-05-15

## 2024-05-21 NOTE — PROGRESS NOTES
24  Terrance Bell : 1965 Sex: male  Age 58 y.o.      Subjective:  Chief Complaint   Patient presents with    Leg Pain    Foot Swelling         HPI:   HPI  Terrance Bell , 58 y.o. male presents to express care for evaluation of leg pain, swelling.  The patient is a diabetic.  The patient has history of leg swelling associated with the Actos.  The patient states that he was placed on Actos and had to be taken off because of leg swelling they restarted him at a lower dose.  The patient has noted the symptoms over the last week.  He does not have any history of DVT or PE.  The patient has not any fever, chills.  Chest pain, shortness of breath.  There is slight redness noted to the right lower extremity.  The patient was given a prescription for Ozempic and states that he just got it approved and he is to  the medication today.  The patient is not having any chest pain, shortness of breath, fevers, chills.  Patient denies any other complaints at this time.      ROS:   Unless otherwise stated in this report the patient's positive and negative responses for review of systems for constitutional, eyes, ENT, cardiovascular, respiratory, gastrointestinal, neurological, , musculoskeletal, and integument systems and related systems to the presenting problem are either stated in the history of present illness or were not pertinent or were negative for the symptoms and/or complaints related to the presenting medical problem.  Positives and pertinent negatives as per HPI.  All others reviewed and are negative.      PMH:     Past Medical History:   Diagnosis Date    Arthritis     Asthma     Deviated nasal septum     Diabetes mellitus (HCC)     Hypertension     Prolonged emergence from general anesthesia        Past Surgical History:   Procedure Laterality Date    APPENDECTOMY      COLONOSCOPY N/A 04/15/2021    COLONOSCOPY WITH BIOPSY performed by Bird Brown DO at [facility] ENDOSCOPY    FOOT SURGERY Right

## 2024-05-28 ENCOUNTER — OFFICE VISIT (OUTPATIENT)
Dept: ENT CLINIC | Age: 59
End: 2024-05-28
Payer: COMMERCIAL

## 2024-05-28 VITALS
SYSTOLIC BLOOD PRESSURE: 126 MMHG | OXYGEN SATURATION: 97 % | HEART RATE: 75 BPM | HEIGHT: 73 IN | WEIGHT: 315 LBS | BODY MASS INDEX: 41.75 KG/M2 | DIASTOLIC BLOOD PRESSURE: 66 MMHG

## 2024-05-28 DIAGNOSIS — R09.81 NASAL CONGESTION: Primary | ICD-10-CM

## 2024-05-28 DIAGNOSIS — Z98.890 S/P FESS (FUNCTIONAL ENDOSCOPIC SINUS SURGERY): ICD-10-CM

## 2024-05-28 DIAGNOSIS — J32.4 CHRONIC PANSINUSITIS: ICD-10-CM

## 2024-05-28 PROCEDURE — G8427 DOCREV CUR MEDS BY ELIG CLIN: HCPCS | Performed by: OTOLARYNGOLOGY

## 2024-05-28 PROCEDURE — 3017F COLORECTAL CA SCREEN DOC REV: CPT | Performed by: OTOLARYNGOLOGY

## 2024-05-28 PROCEDURE — 99213 OFFICE O/P EST LOW 20 MIN: CPT | Performed by: OTOLARYNGOLOGY

## 2024-05-28 PROCEDURE — G8417 CALC BMI ABV UP PARAM F/U: HCPCS | Performed by: OTOLARYNGOLOGY

## 2024-05-28 PROCEDURE — 1036F TOBACCO NON-USER: CPT | Performed by: OTOLARYNGOLOGY

## 2024-05-28 ASSESSMENT — ENCOUNTER SYMPTOMS
SINUS PAIN: 0
EYES NEGATIVE: 1
GASTROINTESTINAL NEGATIVE: 1
COLOR CHANGE: 0
RESPIRATORY NEGATIVE: 1
ABDOMINAL PAIN: 0
RHINORRHEA: 0
SHORTNESS OF BREATH: 0
SINUS PRESSURE: 0

## 2024-05-28 NOTE — PROGRESS NOTES
Subjective:      Patient ID:  Terrance Bell is a 58 y.o. male.    HPI:    Pt is returning from FESS surgery 6 month(s) ago.  PT is  doing well.  PT is  breathing better.   Pt is not using Flonase. Is using astelin.    Still feeling the right side is not working well.       Past Medical History:   Diagnosis Date    Arthritis     Asthma     Deviated nasal septum     Diabetes mellitus (HCC)     Hypertension     Prolonged emergence from general anesthesia      Past Surgical History:   Procedure Laterality Date    APPENDECTOMY      COLONOSCOPY N/A 04/15/2021    COLONOSCOPY WITH BIOPSY performed by Bird Brown DO at [facility] ENDOSCOPY    FOOT SURGERY Right     PILONIDAL CYST EXCISION      SINUS ENDOSCOPY Bilateral 2023    FUNCTIONAL ENDOSCOPIC SINUS SURGERY, SUBMUCOSAL RESECTION INFERIOR TURBINATES, SEPTOPLASTY performed by Bobby Jacobs DO at [facility] OR    TONSILLECTOMY      UPPER GASTROINTESTINAL ENDOSCOPY N/A 04/15/2021    EGD ESOPHAGOGASTRODUODENOSCOPY DILATATION performed by Bird Brown DO at [facility] ENDOSCOPY     Family History   Problem Relation Age of Onset    Cancer Mother     Diabetes Mother      Social History     Socioeconomic History    Marital status: Single     Spouse name: None    Number of children: 4    Years of education: 14    Highest education level: None   Occupational History     Employer: SELF-EMPLOYED   Tobacco Use    Smoking status: Former     Current packs/day: 0.00     Average packs/day: 2.0 packs/day for 17.9 years (35.8 ttl pk-yrs)     Types: Cigarettes     Start date:      Quit date: 1999     Years since quittin.4    Smokeless tobacco: Never   Vaping Use    Vaping Use: Never used   Substance and Sexual Activity    Alcohol use: No    Drug use: Not Currently     Social Determinants of Health     Financial Resource Strain: Patient Declined (2024)    Overall Financial Resource Strain (CARDIA)     Difficulty of Paying Living Expenses: Patient declined   Food

## 2024-05-29 ENCOUNTER — OFFICE VISIT (OUTPATIENT)
Dept: PRIMARY CARE CLINIC | Age: 59
End: 2024-05-29
Payer: COMMERCIAL

## 2024-05-29 VITALS
BODY MASS INDEX: 41.75 KG/M2 | OXYGEN SATURATION: 95 % | HEIGHT: 73 IN | DIASTOLIC BLOOD PRESSURE: 80 MMHG | SYSTOLIC BLOOD PRESSURE: 130 MMHG | HEART RATE: 89 BPM | WEIGHT: 315 LBS | TEMPERATURE: 98 F

## 2024-05-29 DIAGNOSIS — E66.01 MORBID OBESITY (HCC): ICD-10-CM

## 2024-05-29 DIAGNOSIS — I87.2 VENOUS INSUFFICIENCY: ICD-10-CM

## 2024-05-29 DIAGNOSIS — E29.1 HYPOTESTOSTERONEMIA IN MALE: ICD-10-CM

## 2024-05-29 DIAGNOSIS — I15.9 SECONDARY HYPERTENSION: ICD-10-CM

## 2024-05-29 DIAGNOSIS — E11.9 TYPE 2 DIABETES MELLITUS WITHOUT COMPLICATION, WITHOUT LONG-TERM CURRENT USE OF INSULIN (HCC): Primary | ICD-10-CM

## 2024-05-29 PROCEDURE — 2022F DILAT RTA XM EVC RTNOPTHY: CPT | Performed by: FAMILY MEDICINE

## 2024-05-29 PROCEDURE — 3017F COLORECTAL CA SCREEN DOC REV: CPT | Performed by: FAMILY MEDICINE

## 2024-05-29 PROCEDURE — 1036F TOBACCO NON-USER: CPT | Performed by: FAMILY MEDICINE

## 2024-05-29 PROCEDURE — 3044F HG A1C LEVEL LT 7.0%: CPT | Performed by: FAMILY MEDICINE

## 2024-05-29 PROCEDURE — 99214 OFFICE O/P EST MOD 30 MIN: CPT | Performed by: FAMILY MEDICINE

## 2024-05-29 PROCEDURE — G8427 DOCREV CUR MEDS BY ELIG CLIN: HCPCS | Performed by: FAMILY MEDICINE

## 2024-05-29 PROCEDURE — G8417 CALC BMI ABV UP PARAM F/U: HCPCS | Performed by: FAMILY MEDICINE

## 2024-05-29 RX ORDER — GABAPENTIN 100 MG/1
100 CAPSULE ORAL NIGHTLY
Qty: 30 CAPSULE | Refills: 3 | Status: SHIPPED | OUTPATIENT
Start: 2024-05-29 | End: 2024-09-26

## 2024-05-29 RX ORDER — LISINOPRIL 40 MG/1
TABLET ORAL
Qty: 90 TABLET | Refills: 3 | Status: SHIPPED | OUTPATIENT
Start: 2024-05-29

## 2024-05-29 ASSESSMENT — ENCOUNTER SYMPTOMS
ALLERGIC/IMMUNOLOGIC NEGATIVE: 1
EYES NEGATIVE: 1
RESPIRATORY NEGATIVE: 1
GASTROINTESTINAL NEGATIVE: 1

## 2024-05-29 NOTE — PROGRESS NOTES
Future    Morbid obesity (HCC)    Hypotestosteronemia in male    Venous insufficiency  -     CBC with Auto Differential; Future  -     Comprehensive Metabolic Panel; Future  -     Hemoglobin A1C; Future  -     Lipid Panel; Future  -     T4; Future  -     TSH; Future    Other orders  -     gabapentin (NEURONTIN) 100 MG capsule; Take 1 capsule by mouth nightly for 120 days. Intended supply: 30 days            Return in about 4 weeks (around 6/26/2024).      Jaziel Zapata,     Note was generated with the assistance of voice recognition software.  Document was reviewed however may contain grammatical errors.

## 2024-06-07 ENCOUNTER — OFFICE VISIT (OUTPATIENT)
Dept: PODIATRY | Age: 59
End: 2024-06-07
Payer: COMMERCIAL

## 2024-06-07 VITALS — BODY MASS INDEX: 57.52 KG/M2 | TEMPERATURE: 98.2 F | WEIGHT: 315 LBS

## 2024-06-07 DIAGNOSIS — G60.8 HEREDITARY SENSORY NEUROPATHY: ICD-10-CM

## 2024-06-07 DIAGNOSIS — B35.1 TINEA UNGUIUM: ICD-10-CM

## 2024-06-07 DIAGNOSIS — M20.41 HAMMER TOES OF BOTH FEET: ICD-10-CM

## 2024-06-07 DIAGNOSIS — E11.9 TYPE 2 DIABETES MELLITUS WITHOUT COMPLICATION, UNSPECIFIED WHETHER LONG TERM INSULIN USE (HCC): Primary | ICD-10-CM

## 2024-06-07 DIAGNOSIS — M20.42 HAMMER TOES OF BOTH FEET: ICD-10-CM

## 2024-06-07 DIAGNOSIS — L84 CORNS AND CALLOSITIES: ICD-10-CM

## 2024-06-07 PROCEDURE — 11721 DEBRIDE NAIL 6 OR MORE: CPT | Performed by: PODIATRIST

## 2024-06-07 PROCEDURE — 99999 PR OFFICE/OUTPT VISIT,PROCEDURE ONLY: CPT | Performed by: PODIATRIST

## 2024-06-07 PROCEDURE — 11056 PARNG/CUTG B9 HYPRKR LES 2-4: CPT | Performed by: PODIATRIST

## 2024-06-07 NOTE — PROGRESS NOTES
Pt was last seen in 3/2024 for nails corns callous's   Jacyalexandra Jaziel ROCHA,   Last visit 5/29/24      CC:    Follow-up diabetic foot ankle exam    HPI:   Follow-up diabetic foot ankle exam.  History gabapentin.  No recent injury no additional pedal complaints.        ROS:  Const: Denies constitutional symptoms  Musculo: Denies symptoms other than stated above  Skin: Denies symptoms other than stated above       Current Outpatient Medications:     gabapentin (NEURONTIN) 100 MG capsule, Take 1 capsule by mouth nightly for 120 days. Intended supply: 30 days, Disp: 30 capsule, Rfl: 3    lisinopril (PRINIVIL;ZESTRIL) 40 MG tablet, 1 qd, Disp: 90 tablet, Rfl: 3    OZEMPIC, 0.25 OR 0.5 MG/DOSE, 2 MG/3ML SOPN, Inject 3 mLs into the skin once a week, Disp: , Rfl:     azelastine HCl (ASTEPRO) 0.15 % SOLN, by Nasal route, Disp: , Rfl:     fluticasone-salmeterol (ADVAIR DISKUS) 250-50 MCG/ACT AEPB diskus inhaler, INHALE 1 DOSE BY MOUTH IN THE MORNING AND 1 IN THE EVENING, Disp: 60 each, Rfl: 3    Semaglutide,0.25 or 0.5MG/DOS, 2 MG/1.5ML SOPN, 0.25 mg q week, Disp: 1.5 mL, Rfl: 1    diclofenac sodium (VOLTAREN) 1 % GEL, Apply 4 g topically 4 times daily, Disp: 350 g, Rfl: 0    mupirocin (BACTROBAN) 2 % ointment, Mixed in nasal saline, 2 sprays each nostril twice daily for 14 days, Disp: 1 each, Rfl: 1    ASPIRIN 81 PO, Take by mouth daily, Disp: , Rfl:     ciclopirox (LOPROX) 0.77 % cream, Apply topically 2 times daily Apply topically 2 times daily., Disp: , Rfl:     albuterol sulfate HFA (PROVENTIL;VENTOLIN;PROAIR) 108 (90 Base) MCG/ACT inhaler, Inhale 2 puffs into the lungs 4 times daily as needed for Wheezing, Disp: 18 g, Rfl: 0    fluticasone (FLONASE) 50 MCG/ACT nasal spray, 2 sprays by Each Nostril route daily, Disp: 48 g, Rfl: 1    montelukast (SINGULAIR) 10 MG tablet, Take 1 tablet by mouth nightly, Disp: , Rfl:     furosemide (LASIX) 20 MG tablet, Take 1 tablet by mouth daily for 7 days, Disp: 7 tablet, Rfl:

## 2024-06-13 ENCOUNTER — TELEPHONE (OUTPATIENT)
Dept: ENT CLINIC | Age: 59
End: 2024-06-13

## 2024-06-13 NOTE — TELEPHONE ENCOUNTER
Pt called in asking for an appt today or tormorrow  He said that he has cotton stuck in his ear.  He said it is the complete Qtip head.    Please advise on scheduling.

## 2024-06-13 NOTE — TELEPHONE ENCOUNTER
Called patient in regards to current symptoms patient placed cream in ear and head of q-tip went deep in ear canal scheduled 6/14/24 at 9am.

## 2024-06-14 ENCOUNTER — OFFICE VISIT (OUTPATIENT)
Dept: ENT CLINIC | Age: 59
End: 2024-06-14
Payer: COMMERCIAL

## 2024-06-14 VITALS
HEIGHT: 73 IN | TEMPERATURE: 97.9 F | BODY MASS INDEX: 41.75 KG/M2 | WEIGHT: 315 LBS | HEART RATE: 102 BPM | DIASTOLIC BLOOD PRESSURE: 69 MMHG | SYSTOLIC BLOOD PRESSURE: 125 MMHG

## 2024-06-14 DIAGNOSIS — T16.2XXA FOREIGN BODY IN AURICLE OF LEFT EAR, INITIAL ENCOUNTER: Primary | ICD-10-CM

## 2024-06-14 PROCEDURE — 99213 OFFICE O/P EST LOW 20 MIN: CPT | Performed by: OTOLARYNGOLOGY

## 2024-06-14 NOTE — PROGRESS NOTES
Vital Sign     Worried About Running Out of Food in the Last Year: Patient declined     Ran Out of Food in the Last Year: Patient declined   Transportation Needs: Unknown (4/22/2024)    PRAPARE - Transportation     Lack of Transportation (Non-Medical): Patient declined   Physical Activity: Unknown (10/22/2023)    Exercise Vital Sign     Days of Exercise per Week: Patient declined   Intimate Partner Violence: Not At Risk (10/22/2023)    Humiliation, Afraid, Rape, and Kick questionnaire     Fear of Current or Ex-Partner: No     Emotionally Abused: No     Physically Abused: No     Sexually Abused: No   Housing Stability: Unknown (4/22/2024)    Housing Stability Vital Sign     Unstable Housing in the Last Year: Patient declined     Allergies   Allergen Reactions    Ciprofloxacin Swelling    Levofloxacin Other (See Comments)     pain    Doxycycline Other (See Comments)     Vision problems       Review of Systems   Constitutional: Negative.  Negative for fever.   HENT:  Positive for congestion. Negative for postnasal drip, rhinorrhea, sinus pressure, sinus pain and sneezing.    Eyes: Negative.  Negative for visual disturbance.   Respiratory: Negative.  Negative for shortness of breath.    Cardiovascular: Negative.  Negative for chest pain.   Gastrointestinal: Negative.  Negative for abdominal pain.   Genitourinary: Negative.    Musculoskeletal: Negative.    Skin: Negative.  Negative for color change.   Allergic/Immunologic: Positive for environmental allergies.   Neurological: Negative.    Psychiatric/Behavioral: Negative.  Negative for behavioral problems and hallucinations.    All other systems reviewed and are negative.              Objective:     Vitals:    06/14/24 0901   BP: 125/69   Pulse: (!) 102   Temp: 97.9 °F (36.6 °C)     Physical Exam  Vitals and nursing note reviewed.   Constitutional:       Appearance: He is well-developed.   HENT:      Head: Normocephalic and atraumatic.      Right Ear: Hearing, tympanic

## 2024-06-21 DIAGNOSIS — I15.9 SECONDARY HYPERTENSION: ICD-10-CM

## 2024-06-21 DIAGNOSIS — E11.9 TYPE 2 DIABETES MELLITUS WITHOUT COMPLICATION, WITHOUT LONG-TERM CURRENT USE OF INSULIN (HCC): ICD-10-CM

## 2024-06-21 DIAGNOSIS — I87.2 VENOUS INSUFFICIENCY: ICD-10-CM

## 2024-06-21 LAB
ALBUMIN: 4.2 G/DL (ref 3.5–5.2)
ALP BLD-CCNC: 88 U/L (ref 40–129)
ALT SERPL-CCNC: 21 U/L (ref 0–40)
ANION GAP SERPL CALCULATED.3IONS-SCNC: 17 MMOL/L (ref 7–16)
AST SERPL-CCNC: 21 U/L (ref 0–39)
BASOPHILS ABSOLUTE: 0.04 K/UL (ref 0–0.2)
BASOPHILS RELATIVE PERCENT: 1 % (ref 0–2)
BILIRUB SERPL-MCNC: 0.5 MG/DL (ref 0–1.2)
BUN BLDV-MCNC: 13 MG/DL (ref 6–20)
CALCIUM SERPL-MCNC: 9.3 MG/DL (ref 8.6–10.2)
CHLORIDE BLD-SCNC: 104 MMOL/L (ref 98–107)
CHOLESTEROL, TOTAL: 158 MG/DL
CO2: 18 MMOL/L (ref 22–29)
CREAT SERPL-MCNC: 0.9 MG/DL (ref 0.7–1.2)
EOSINOPHILS ABSOLUTE: 0.28 K/UL (ref 0.05–0.5)
EOSINOPHILS RELATIVE PERCENT: 4 % (ref 0–6)
GFR, ESTIMATED: >90 ML/MIN/1.73M2
GLUCOSE BLD-MCNC: 82 MG/DL (ref 74–99)
HBA1C MFR BLD: 6 % (ref 4–5.6)
HCT VFR BLD CALC: 42.3 % (ref 37–54)
HDLC SERPL-MCNC: 32 MG/DL
HEMOGLOBIN: 13.5 G/DL (ref 12.5–16.5)
IMMATURE GRANULOCYTES %: 1 % (ref 0–5)
IMMATURE GRANULOCYTES ABSOLUTE: 0.04 K/UL (ref 0–0.58)
LDL CHOLESTEROL: 104 MG/DL
LYMPHOCYTES ABSOLUTE: 1.4 K/UL (ref 1.5–4)
LYMPHOCYTES RELATIVE PERCENT: 22 % (ref 20–42)
MCH RBC QN AUTO: 28.4 PG (ref 26–35)
MCHC RBC AUTO-ENTMCNC: 31.9 G/DL (ref 32–34.5)
MCV RBC AUTO: 89.1 FL (ref 80–99.9)
MONOCYTES ABSOLUTE: 0.42 K/UL (ref 0.1–0.95)
MONOCYTES RELATIVE PERCENT: 7 % (ref 2–12)
NEUTROPHILS ABSOLUTE: 4.27 K/UL (ref 1.8–7.3)
NEUTROPHILS RELATIVE PERCENT: 66 % (ref 43–80)
PDW BLD-RTO: 15 % (ref 11.5–15)
PLATELET # BLD: 272 K/UL (ref 130–450)
PMV BLD AUTO: 10.4 FL (ref 7–12)
POTASSIUM SERPL-SCNC: 4.3 MMOL/L (ref 3.5–5)
RBC # BLD: 4.75 M/UL (ref 3.8–5.8)
SODIUM BLD-SCNC: 139 MMOL/L (ref 132–146)
T4 TOTAL: 9.2 UG/DL (ref 4.5–11.7)
TOTAL PROTEIN: 7.3 G/DL (ref 6.4–8.3)
TRIGL SERPL-MCNC: 108 MG/DL
TSH SERPL DL<=0.05 MIU/L-ACNC: 2.47 UIU/ML (ref 0.27–4.2)
VLDLC SERPL CALC-MCNC: 22 MG/DL
WBC # BLD: 6.5 K/UL (ref 4.5–11.5)

## 2024-06-28 ENCOUNTER — OFFICE VISIT (OUTPATIENT)
Dept: PRIMARY CARE CLINIC | Age: 59
End: 2024-06-28
Payer: COMMERCIAL

## 2024-06-28 VITALS
BODY MASS INDEX: 41.75 KG/M2 | SYSTOLIC BLOOD PRESSURE: 132 MMHG | HEART RATE: 77 BPM | WEIGHT: 315 LBS | OXYGEN SATURATION: 96 % | HEIGHT: 73 IN | DIASTOLIC BLOOD PRESSURE: 84 MMHG | TEMPERATURE: 98 F

## 2024-06-28 DIAGNOSIS — E29.1 HYPOTESTOSTERONEMIA IN MALE: ICD-10-CM

## 2024-06-28 DIAGNOSIS — E11.9 TYPE 2 DIABETES MELLITUS WITHOUT COMPLICATION, WITHOUT LONG-TERM CURRENT USE OF INSULIN (HCC): Primary | ICD-10-CM

## 2024-06-28 DIAGNOSIS — M17.11 PRIMARY OSTEOARTHRITIS OF RIGHT KNEE: ICD-10-CM

## 2024-06-28 DIAGNOSIS — E66.01 MORBID OBESITY (HCC): ICD-10-CM

## 2024-06-28 DIAGNOSIS — K21.9 GASTROESOPHAGEAL REFLUX DISEASE, UNSPECIFIED WHETHER ESOPHAGITIS PRESENT: ICD-10-CM

## 2024-06-28 PROCEDURE — G8417 CALC BMI ABV UP PARAM F/U: HCPCS | Performed by: FAMILY MEDICINE

## 2024-06-28 PROCEDURE — 2022F DILAT RTA XM EVC RTNOPTHY: CPT | Performed by: FAMILY MEDICINE

## 2024-06-28 PROCEDURE — 99214 OFFICE O/P EST MOD 30 MIN: CPT | Performed by: FAMILY MEDICINE

## 2024-06-28 PROCEDURE — 3044F HG A1C LEVEL LT 7.0%: CPT | Performed by: FAMILY MEDICINE

## 2024-06-28 PROCEDURE — 1036F TOBACCO NON-USER: CPT | Performed by: FAMILY MEDICINE

## 2024-06-28 PROCEDURE — 3017F COLORECTAL CA SCREEN DOC REV: CPT | Performed by: FAMILY MEDICINE

## 2024-06-28 PROCEDURE — G8427 DOCREV CUR MEDS BY ELIG CLIN: HCPCS | Performed by: FAMILY MEDICINE

## 2024-06-28 RX ORDER — SEMAGLUTIDE 0.68 MG/ML
INJECTION, SOLUTION SUBCUTANEOUS
Qty: 3 ML | Refills: 2 | Status: SHIPPED | OUTPATIENT
Start: 2024-06-28

## 2024-06-28 RX ORDER — GABAPENTIN 100 MG/1
100 CAPSULE ORAL NIGHTLY
Qty: 30 CAPSULE | Refills: 3 | Status: SHIPPED | OUTPATIENT
Start: 2024-06-28 | End: 2024-10-26

## 2024-06-28 ASSESSMENT — ENCOUNTER SYMPTOMS
GASTROINTESTINAL NEGATIVE: 1
ALLERGIC/IMMUNOLOGIC NEGATIVE: 1
EYES NEGATIVE: 1
RESPIRATORY NEGATIVE: 1

## 2024-06-28 NOTE — PROGRESS NOTES
puffs into the lungs 4 times daily as needed for Wheezing, Disp: 18 g, Rfl: 0    fluticasone (FLONASE) 50 MCG/ACT nasal spray, 2 sprays by Each Nostril route daily, Disp: 48 g, Rfl: 1    montelukast (SINGULAIR) 10 MG tablet, Take 1 tablet by mouth nightly, Disp: , Rfl:     furosemide (LASIX) 20 MG tablet, Take 1 tablet by mouth daily for 7 days, Disp: 7 tablet, Rfl: 0    Allergies   Allergen Reactions    Ciprofloxacin Swelling    Levofloxacin Other (See Comments)     pain    Doxycycline Other (See Comments)     Vision problems       Social History     Tobacco Use    Smoking status: Former     Current packs/day: 0.00     Average packs/day: 2.0 packs/day for 17.9 years (35.8 ttl pk-yrs)     Types: Cigarettes     Start date:      Quit date: 1999     Years since quittin.5    Smokeless tobacco: Never   Vaping Use    Vaping Use: Never used   Substance Use Topics    Alcohol use: No    Drug use: Not Currently      Past Surgical History:   Procedure Laterality Date    APPENDECTOMY      COLONOSCOPY N/A 04/15/2021    COLONOSCOPY WITH BIOPSY performed by Bird Brown DO at [facility] ENDOSCOPY    FOOT SURGERY Right     PILONIDAL CYST EXCISION      SINUS ENDOSCOPY Bilateral 2023    FUNCTIONAL ENDOSCOPIC SINUS SURGERY, SUBMUCOSAL RESECTION INFERIOR TURBINATES, SEPTOPLASTY performed by Bobby Jacobs DO at [facility] OR    TONSILLECTOMY      UPPER GASTROINTESTINAL ENDOSCOPY N/A 04/15/2021    EGD ESOPHAGOGASTRODUODENOSCOPY DILATATION performed by Bird Brown DO at [facility] ENDOSCOPY     Family History   Problem Relation Age of Onset    Cancer Mother     Diabetes Mother      Past Medical History:   Diagnosis Date    Arthritis     Asthma     Deviated nasal septum     Diabetes mellitus (HCC)     Hypertension     Prolonged emergence from general anesthesia        Vitals:    24 0946   BP: 132/84   Pulse: 77   Temp: 98 °F (36.7 °C)   SpO2: 96%   Weight: (!) 189.1 kg (417 lb)   Height: 1.854 m (6' 1\")     BP

## 2024-07-26 ENCOUNTER — OFFICE VISIT (OUTPATIENT)
Dept: PRIMARY CARE CLINIC | Age: 59
End: 2024-07-26

## 2024-07-26 VITALS
BODY MASS INDEX: 41.75 KG/M2 | TEMPERATURE: 98.1 F | HEART RATE: 90 BPM | HEIGHT: 73 IN | WEIGHT: 315 LBS | DIASTOLIC BLOOD PRESSURE: 74 MMHG | SYSTOLIC BLOOD PRESSURE: 114 MMHG | OXYGEN SATURATION: 96 %

## 2024-07-26 DIAGNOSIS — I87.2 VENOUS INSUFFICIENCY: ICD-10-CM

## 2024-07-26 DIAGNOSIS — G89.29 CHRONIC RIGHT SHOULDER PAIN: Primary | ICD-10-CM

## 2024-07-26 DIAGNOSIS — M25.511 CHRONIC RIGHT SHOULDER PAIN: Primary | ICD-10-CM

## 2024-07-26 DIAGNOSIS — E66.01 MORBID OBESITY (HCC): ICD-10-CM

## 2024-07-26 DIAGNOSIS — I15.9 SECONDARY HYPERTENSION: ICD-10-CM

## 2024-07-26 RX ORDER — METHYLPREDNISOLONE ACETATE 40 MG/ML
40 INJECTION, SUSPENSION INTRA-ARTICULAR; INTRALESIONAL; INTRAMUSCULAR; SOFT TISSUE ONCE
Status: COMPLETED | OUTPATIENT
Start: 2024-07-26 | End: 2024-07-26

## 2024-07-26 RX ADMIN — METHYLPREDNISOLONE ACETATE 40 MG: 40 INJECTION, SUSPENSION INTRA-ARTICULAR; INTRALESIONAL; INTRAMUSCULAR; SOFT TISSUE at 09:29

## 2024-07-26 ASSESSMENT — ENCOUNTER SYMPTOMS
EYES NEGATIVE: 1
GASTROINTESTINAL NEGATIVE: 1
ALLERGIC/IMMUNOLOGIC NEGATIVE: 1
RESPIRATORY NEGATIVE: 1

## 2024-07-26 NOTE — PROGRESS NOTES
24     Terrance Bell    : 1965 Sex: male   Age: 58 y.o.      Chief Complaint   Patient presents with    Shoulder Pain     RT       Prior to Admission medications    Medication Sig Start Date End Date Taking? Authorizing Provider   gabapentin (NEURONTIN) 100 MG capsule Take 1 capsule by mouth nightly for 120 days. Intended supply: 30 days 6/28/24 10/26/24 Yes Jaziel Zapata DO   OZEMPIC, 0.25 OR 0.5 MG/DOSE, 2 MG/3ML SOPN 0.5mg q week 24  Yes Jaziel Zapata DO   lisinopril (PRINIVIL;ZESTRIL) 40 MG tablet 1 qd 24  Yes Jaziel Zapata DO   azelastine HCl (ASTEPRO) 0.15 % SOLN by Nasal route   Yes ProviderRichard MD   diclofenac sodium (VOLTAREN) 1 % GEL Apply 4 g topically 4 times daily 24  Yes Davon Wu,    mupirocin (BACTROBAN) 2 % ointment Mixed in nasal saline, 2 sprays each nostril twice daily for 14 days 23  Yes Bobby Sevilla APRN - CNP   ASPIRIN 81 PO Take by mouth daily   Yes ProviderRichard MD   ciclopirox (LOPROX) 0.77 % cream Apply topically 2 times daily Apply topically 2 times daily.   Yes ProviderRichard MD   albuterol sulfate HFA (PROVENTIL;VENTOLIN;PROAIR) 108 (90 Base) MCG/ACT inhaler Inhale 2 puffs into the lungs 4 times daily as needed for Wheezing 22  Yes Wilberto Beckford III, PA   fluticasone (FLONASE) 50 MCG/ACT nasal spray 2 sprays by Each Nostril route daily 22  Yes Bobby Sevilla APRN - CNP   montelukast (SINGULAIR) 10 MG tablet Take 1 tablet by mouth nightly   Yes Richard Abraham MD   furosemide (LASIX) 20 MG tablet Take 1 tablet by mouth daily for 7 days 24  Wilberto Beckford III, PA          HPI: Patient seen today problems with chronic inflammation right shoulder.  He is aware of some home exercises and encouraged him to do those on a regular basis.  Depo-Medrol shot 40 mg today subdeltoid and will reassess in the next 2 weeks.  Venous insufficiency stable obesity stable hypertension

## 2024-08-09 ENCOUNTER — OFFICE VISIT (OUTPATIENT)
Dept: PRIMARY CARE CLINIC | Age: 59
End: 2024-08-09
Payer: COMMERCIAL

## 2024-08-09 VITALS
WEIGHT: 315 LBS | SYSTOLIC BLOOD PRESSURE: 130 MMHG | DIASTOLIC BLOOD PRESSURE: 80 MMHG | HEART RATE: 78 BPM | OXYGEN SATURATION: 97 % | TEMPERATURE: 97.8 F | HEIGHT: 73 IN | BODY MASS INDEX: 41.75 KG/M2

## 2024-08-09 DIAGNOSIS — M25.511 CHRONIC RIGHT SHOULDER PAIN: Primary | ICD-10-CM

## 2024-08-09 DIAGNOSIS — E11.9 TYPE 2 DIABETES MELLITUS WITHOUT COMPLICATION, WITHOUT LONG-TERM CURRENT USE OF INSULIN (HCC): ICD-10-CM

## 2024-08-09 DIAGNOSIS — M19.011 ARTHRITIS OF RIGHT ACROMIOCLAVICULAR JOINT: ICD-10-CM

## 2024-08-09 DIAGNOSIS — G89.29 CHRONIC RIGHT SHOULDER PAIN: Primary | ICD-10-CM

## 2024-08-09 DIAGNOSIS — I15.9 SECONDARY HYPERTENSION: ICD-10-CM

## 2024-08-09 DIAGNOSIS — E66.01 MORBID OBESITY (HCC): ICD-10-CM

## 2024-08-09 DIAGNOSIS — E29.1 HYPOTESTOSTERONEMIA IN MALE: ICD-10-CM

## 2024-08-09 PROCEDURE — 99214 OFFICE O/P EST MOD 30 MIN: CPT | Performed by: FAMILY MEDICINE

## 2024-08-09 PROCEDURE — 1036F TOBACCO NON-USER: CPT | Performed by: FAMILY MEDICINE

## 2024-08-09 PROCEDURE — G8427 DOCREV CUR MEDS BY ELIG CLIN: HCPCS | Performed by: FAMILY MEDICINE

## 2024-08-09 PROCEDURE — 3017F COLORECTAL CA SCREEN DOC REV: CPT | Performed by: FAMILY MEDICINE

## 2024-08-09 PROCEDURE — 2022F DILAT RTA XM EVC RTNOPTHY: CPT | Performed by: FAMILY MEDICINE

## 2024-08-09 PROCEDURE — G8417 CALC BMI ABV UP PARAM F/U: HCPCS | Performed by: FAMILY MEDICINE

## 2024-08-09 PROCEDURE — 3044F HG A1C LEVEL LT 7.0%: CPT | Performed by: FAMILY MEDICINE

## 2024-08-09 NOTE — PROGRESS NOTES
24     Terrance Bell    : 1965 Sex: male   Age: 58 y.o.      Chief Complaint   Patient presents with    Diabetes    Shoulder Pain       Prior to Admission medications    Medication Sig Start Date End Date Taking? Authorizing Provider   gabapentin (NEURONTIN) 100 MG capsule Take 1 capsule by mouth nightly for 120 days. Intended supply: 30 days 6/28/24 10/26/24 Yes Jaziel Zapata DO   OZEMPIC, 0.25 OR 0.5 MG/DOSE, 2 MG/3ML SOPN 0.5mg q week 24  Yes Jaziel Zapata DO   lisinopril (PRINIVIL;ZESTRIL) 40 MG tablet 1 qd 24  Yes Jaziel Zapata DO   azelastine HCl (ASTEPRO) 0.15 % SOLN by Nasal route   Yes ProviderRichard MD   ASPIRIN 81 PO Take by mouth daily   Yes Richard Abraham MD   ciclopirox (LOPROX) 0.77 % cream Apply topically 2 times daily Apply topically 2 times daily.   Yes ProviderRichard MD   albuterol sulfate HFA (PROVENTIL;VENTOLIN;PROAIR) 108 (90 Base) MCG/ACT inhaler Inhale 2 puffs into the lungs 4 times daily as needed for Wheezing 22  Yes Wilberto Beckford III, PA   montelukast (SINGULAIR) 10 MG tablet Take 1 tablet by mouth nightly   Yes ProviderRichard MD          HPI: Patient evaluated today chronic right shoulder pain hypertension obesity diabetes low testosterone and joint arthritic disease of the right shoulder with chronic pain.  Referral today Dr. Vito Tinajero.  Medications will continue as prescribed.  2-month follow-up with me and blood work at that time.  Recent urology evaluation Dr. Mika Rader all stable          Review of Systems   Constitutional: Negative.    HENT: Negative.     Eyes: Negative.    Respiratory: Negative.     Gastrointestinal: Negative.    Endocrine: Negative.    Genitourinary: Negative.    Musculoskeletal:  Positive for arthralgias.   Skin: Negative.    Allergic/Immunologic: Negative.    Neurological: Negative.    Hematological: Negative.    Psychiatric/Behavioral: Negative.                Current Outpatient

## 2024-08-20 ENCOUNTER — OFFICE VISIT (OUTPATIENT)
Dept: ORTHOPEDIC SURGERY | Age: 59
End: 2024-08-20

## 2024-08-20 VITALS — HEIGHT: 73 IN | BODY MASS INDEX: 41.75 KG/M2 | WEIGHT: 315 LBS

## 2024-08-20 DIAGNOSIS — M75.81 TENDINITIS OF RIGHT ROTATOR CUFF: ICD-10-CM

## 2024-08-20 DIAGNOSIS — M25.511 CHRONIC RIGHT SHOULDER PAIN: Primary | ICD-10-CM

## 2024-08-20 DIAGNOSIS — G89.29 CHRONIC RIGHT SHOULDER PAIN: Primary | ICD-10-CM

## 2024-08-20 DIAGNOSIS — M75.41 ROTATOR CUFF IMPINGEMENT SYNDROME OF RIGHT SHOULDER: ICD-10-CM

## 2024-08-20 RX ORDER — LIDOCAINE HYDROCHLORIDE 10 MG/ML
3 INJECTION, SOLUTION INFILTRATION; PERINEURAL ONCE
Status: COMPLETED | OUTPATIENT
Start: 2024-08-20 | End: 2024-08-20

## 2024-08-20 RX ORDER — BETAMETHASONE SODIUM PHOSPHATE AND BETAMETHASONE ACETATE 3; 3 MG/ML; MG/ML
6 INJECTION, SUSPENSION INTRA-ARTICULAR; INTRALESIONAL; INTRAMUSCULAR; SOFT TISSUE ONCE
Status: COMPLETED | OUTPATIENT
Start: 2024-08-20 | End: 2024-08-20

## 2024-08-20 RX ADMIN — BETAMETHASONE SODIUM PHOSPHATE AND BETAMETHASONE ACETATE 6 MG: 3; 3 INJECTION, SUSPENSION INTRA-ARTICULAR; INTRALESIONAL; INTRAMUSCULAR; SOFT TISSUE at 11:59

## 2024-08-20 RX ADMIN — LIDOCAINE HYDROCHLORIDE 3 ML: 10 INJECTION, SOLUTION INFILTRATION; PERINEURAL at 12:00

## 2024-08-20 NOTE — PROGRESS NOTES
[facility]  PRIMARY CARE SPORTS MEDICINE  DATE OF VISIT : 2024    Patient : Terrance Bell  Age : 58 y.o.   : 1965  MRN : [id number]   ______________________________________________________________________    Chief Complaint :   Chief Complaint   Patient presents with    Shoulder Pain     Right shoulder pain.  Patient states that he has had injections which took the edge off the pain but no relief of symptoms.  Patient has been having pain ongoing since . Patient had IM injection.        HPI : Terrance Bell is 58 y.o. male who presented to the clinic today for evaluation of right shoulder pain. The onset of the pain was several years ago, with no known mechanism of injury.  Current symptoms include right lateral shoulder pain. No history of dislocation. Patient denies numbness and tingling. Symptoms are aggravated by repetitive use, work at or above shoulder height, and sleeping on affected side. Evaluation to date: XRs of the right shoulder which demonstrate no acute fractures or dislocations.  Treatment to date: avoidance of offending activity and OTC analgesics which are not very effective.     Past Medical History :  Past Medical History:   Diagnosis Date    Arthritis     Asthma     Deviated nasal septum     Diabetes mellitus (HCC)     Hypertension     Prolonged emergence from general anesthesia      Past Surgical History:   Procedure Laterality Date    APPENDECTOMY      COLONOSCOPY N/A 04/15/2021    COLONOSCOPY WITH BIOPSY performed by Bird Brown DO at [facility] ENDOSCOPY    FOOT SURGERY Right     PILONIDAL CYST EXCISION      SINUS ENDOSCOPY Bilateral 2023    FUNCTIONAL ENDOSCOPIC SINUS SURGERY, SUBMUCOSAL RESECTION INFERIOR TURBINATES, SEPTOPLASTY performed by Bobby Jacobs DO at [facility] OR    TONSILLECTOMY      UPPER GASTROINTESTINAL ENDOSCOPY N/A 04/15/2021    EGD ESOPHAGOGASTRODUODENOSCOPY DILATATION performed by Bird Brown DO at [facility] ENDOSCOPY       Allergies :   Allergies  nigel, ( - ) Belly yemi, ( - ) ER weakness   ______________________________________________________________________    Assessment & Plan :    1. Chronic right shoulder pain  2. Tendinitis of right rotator cuff  3. Rotator cuff impingement syndrome of right shoulder  Patient presents to the office today for evaluation of right shoulder pain.  History, referring provider note, physical exam and imaging (as interpreted by me) are consistent with rotator cuff tendinitis/impingement syndrome of the right shoulder with superimposed AC joint osteoarthritis.  Treatment options discussed with patient in the office today including activity modification, oral anti-inflammatories, physical therapy, injection options, advanced imaging in the form of a MRI and referral to an orthopedic surgeon for discussion of surgical opinion. Patient wishes to proceed with conservative treatment in the form of an ultrasound-guided corticosteroid injection which was performed in the office today, please see separate procedure note for further details.  Patient will follow up in 6 weeks for reevaluation of symptoms and consider escalation therapy should symptoms persist.  Patient is agreeable with above plan all questions and concerns were addressed in the office today.     - US ARTHR/ASP/INJ MAJOR JNT/BURSA RIGHT; Future  - betamethasone acetate-betamethasone sodium phosphate (CELESTONE) injection 6 mg  - lidocaine 1 % injection 3 mL    Return to Office: Return in about 6 weeks (around 10/1/2024) for injection follow up.    Bobby Harmon MD

## 2024-09-11 ENCOUNTER — OFFICE VISIT (OUTPATIENT)
Dept: FAMILY MEDICINE CLINIC | Age: 59
End: 2024-09-11
Payer: COMMERCIAL

## 2024-09-11 VITALS
TEMPERATURE: 97.9 F | DIASTOLIC BLOOD PRESSURE: 72 MMHG | SYSTOLIC BLOOD PRESSURE: 136 MMHG | HEART RATE: 113 BPM | OXYGEN SATURATION: 97 %

## 2024-09-11 DIAGNOSIS — M79.671 RIGHT FOOT PAIN: ICD-10-CM

## 2024-09-11 DIAGNOSIS — M25.571 ACUTE RIGHT ANKLE PAIN: Primary | ICD-10-CM

## 2024-09-11 PROCEDURE — 99214 OFFICE O/P EST MOD 30 MIN: CPT | Performed by: PHYSICIAN ASSISTANT

## 2024-09-11 PROCEDURE — 1036F TOBACCO NON-USER: CPT | Performed by: PHYSICIAN ASSISTANT

## 2024-09-11 PROCEDURE — 3017F COLORECTAL CA SCREEN DOC REV: CPT | Performed by: PHYSICIAN ASSISTANT

## 2024-09-11 PROCEDURE — G8427 DOCREV CUR MEDS BY ELIG CLIN: HCPCS | Performed by: PHYSICIAN ASSISTANT

## 2024-09-11 PROCEDURE — G8417 CALC BMI ABV UP PARAM F/U: HCPCS | Performed by: PHYSICIAN ASSISTANT

## 2024-09-27 ENCOUNTER — PROCEDURE VISIT (OUTPATIENT)
Dept: PODIATRY | Age: 59
End: 2024-09-27

## 2024-09-27 VITALS — WEIGHT: 315 LBS | HEIGHT: 73 IN | BODY MASS INDEX: 41.75 KG/M2

## 2024-09-27 DIAGNOSIS — M20.42 HAMMER TOES OF BOTH FEET: ICD-10-CM

## 2024-09-27 DIAGNOSIS — E11.9 TYPE 2 DIABETES MELLITUS WITHOUT COMPLICATION, UNSPECIFIED WHETHER LONG TERM INSULIN USE (HCC): Primary | ICD-10-CM

## 2024-09-27 DIAGNOSIS — B35.1 TINEA UNGUIUM: ICD-10-CM

## 2024-09-27 DIAGNOSIS — M72.2 PLANTAR FASCIAL FIBROMATOSIS: ICD-10-CM

## 2024-09-27 DIAGNOSIS — M20.41 HAMMER TOES OF BOTH FEET: ICD-10-CM

## 2024-09-27 DIAGNOSIS — G60.8 HEREDITARY SENSORY NEUROPATHY: ICD-10-CM

## 2024-09-27 DIAGNOSIS — L84 CORNS AND CALLOSITIES: ICD-10-CM

## 2024-10-01 ENCOUNTER — OFFICE VISIT (OUTPATIENT)
Dept: ENT CLINIC | Age: 59
End: 2024-10-01
Payer: COMMERCIAL

## 2024-10-01 VITALS
HEIGHT: 73 IN | HEART RATE: 91 BPM | SYSTOLIC BLOOD PRESSURE: 124 MMHG | OXYGEN SATURATION: 97 % | DIASTOLIC BLOOD PRESSURE: 75 MMHG | WEIGHT: 315 LBS | TEMPERATURE: 98.1 F | BODY MASS INDEX: 41.75 KG/M2

## 2024-10-01 DIAGNOSIS — J32.4 CHRONIC PANSINUSITIS: ICD-10-CM

## 2024-10-01 DIAGNOSIS — Z98.890 S/P FESS (FUNCTIONAL ENDOSCOPIC SINUS SURGERY): Primary | ICD-10-CM

## 2024-10-01 PROCEDURE — G8417 CALC BMI ABV UP PARAM F/U: HCPCS | Performed by: OTOLARYNGOLOGY

## 2024-10-01 PROCEDURE — 1036F TOBACCO NON-USER: CPT | Performed by: OTOLARYNGOLOGY

## 2024-10-01 PROCEDURE — 3017F COLORECTAL CA SCREEN DOC REV: CPT | Performed by: OTOLARYNGOLOGY

## 2024-10-01 PROCEDURE — G8484 FLU IMMUNIZE NO ADMIN: HCPCS | Performed by: OTOLARYNGOLOGY

## 2024-10-01 PROCEDURE — G8427 DOCREV CUR MEDS BY ELIG CLIN: HCPCS | Performed by: OTOLARYNGOLOGY

## 2024-10-01 PROCEDURE — 99213 OFFICE O/P EST LOW 20 MIN: CPT | Performed by: OTOLARYNGOLOGY

## 2024-10-01 ASSESSMENT — ENCOUNTER SYMPTOMS
GASTROINTESTINAL NEGATIVE: 1
ABDOMINAL PAIN: 0
RHINORRHEA: 0
RESPIRATORY NEGATIVE: 1
COLOR CHANGE: 0
SHORTNESS OF BREATH: 0
EYES NEGATIVE: 1
SINUS PAIN: 0
SINUS PRESSURE: 0

## 2024-10-01 NOTE — PROGRESS NOTES
Subjective:      Patient ID:  Terrance Bell is a 58 y.o. male.    HPI:  Pt returns for recheck of allergies.       History of   FESS, septo, SMRITS, and no surgery  When? November year: 2023    Nasal Steroid: no   Name: fluticasone (Flonase)   Still taking: no   reason for stopping:     Other therapy:   Astelin- yes  oral antihistamine- none  leukotriene inhibitor- none  oral decongestant- none    which has been  somewhat effective     Pt has been on therapy for 6 month(s) and is doing adequately    Pt is still feeling like right side is a problem. But no infections    The patient is complaining of nasal congestion and rhinorrhea    The patients worst time of year is all seasons      Patient's medications, allergies, past medical, surgical, social and family histories were reviewed and updated as appropriate.        Review of Systems   Constitutional: Negative.  Negative for fever.   HENT:  Positive for congestion. Negative for postnasal drip, rhinorrhea, sinus pressure, sinus pain and sneezing.    Eyes: Negative.  Negative for visual disturbance.   Respiratory: Negative.  Negative for shortness of breath.    Cardiovascular: Negative.  Negative for chest pain.   Gastrointestinal: Negative.  Negative for abdominal pain.   Genitourinary: Negative.    Musculoskeletal: Negative.    Skin: Negative.  Negative for color change.   Allergic/Immunologic: Positive for environmental allergies.   Neurological: Negative.    Psychiatric/Behavioral: Negative.  Negative for behavioral problems and hallucinations.    All other systems reviewed and are negative.              Objective:     Vitals:    10/01/24 0706   BP: 124/75   Pulse: 91   Temp: 98.1 °F (36.7 °C)   SpO2: 97%     Physical Exam  Vitals and nursing note reviewed.   Constitutional:       Appearance: He is well-developed.   HENT:      Head: Normocephalic and atraumatic.      Right Ear: Hearing, tympanic membrane, ear canal and external ear normal.      Left Ear: Hearing,

## 2024-10-02 ENCOUNTER — OFFICE VISIT (OUTPATIENT)
Dept: ORTHOPEDIC SURGERY | Age: 59
End: 2024-10-02
Payer: COMMERCIAL

## 2024-10-02 VITALS — HEIGHT: 73 IN | BODY MASS INDEX: 41.75 KG/M2 | WEIGHT: 315 LBS

## 2024-10-02 DIAGNOSIS — M25.511 CHRONIC RIGHT SHOULDER PAIN: Primary | ICD-10-CM

## 2024-10-02 DIAGNOSIS — G89.29 CHRONIC RIGHT SHOULDER PAIN: Primary | ICD-10-CM

## 2024-10-02 PROCEDURE — 20611 DRAIN/INJ JOINT/BURSA W/US: CPT | Performed by: FAMILY MEDICINE

## 2024-10-02 RX ORDER — TRIAMCINOLONE ACETONIDE 40 MG/ML
40 INJECTION, SUSPENSION INTRA-ARTICULAR; INTRAMUSCULAR ONCE
Status: COMPLETED | OUTPATIENT
Start: 2024-10-02 | End: 2024-10-02

## 2024-10-02 RX ORDER — LIDOCAINE HYDROCHLORIDE 10 MG/ML
5 INJECTION, SOLUTION INFILTRATION; PERINEURAL ONCE
Status: COMPLETED | OUTPATIENT
Start: 2024-10-02 | End: 2024-10-02

## 2024-10-02 RX ADMIN — LIDOCAINE HYDROCHLORIDE 5 ML: 10 INJECTION, SOLUTION INFILTRATION; PERINEURAL at 14:24

## 2024-10-02 RX ADMIN — TRIAMCINOLONE ACETONIDE 40 MG: 40 INJECTION, SUSPENSION INTRA-ARTICULAR; INTRAMUSCULAR at 14:24

## 2024-10-02 NOTE — PROGRESS NOTES
answered and consent given.     Following denial of allergy and review of potential side effects and complications including but not necessarily limited to infection, allergic reaction, local tissue breakdown, aseptic effusion potentially necessitating aspiration and corticosteroid injection, elevation of blood glucose, injury to soft tissue and/or nerves, and seizure, the patient indicated their understanding and agreed to proceed.    FOLLOW UP    Follow up in 6-8 weeks.    Electronically signed by Bobby Harmon MD on 10/2/2024 at 5:27 PM

## 2024-10-14 DIAGNOSIS — E11.9 TYPE 2 DIABETES MELLITUS WITHOUT COMPLICATION, WITHOUT LONG-TERM CURRENT USE OF INSULIN (HCC): ICD-10-CM

## 2024-10-14 DIAGNOSIS — E29.1 HYPOTESTOSTERONEMIA IN MALE: ICD-10-CM

## 2024-10-14 DIAGNOSIS — I15.9 SECONDARY HYPERTENSION: ICD-10-CM

## 2024-10-14 LAB
ALBUMIN: 4.2 G/DL (ref 3.5–5.2)
ALP BLD-CCNC: 108 U/L (ref 40–129)
ALT SERPL-CCNC: 18 U/L (ref 0–40)
ANION GAP SERPL CALCULATED.3IONS-SCNC: 15 MMOL/L (ref 7–16)
AST SERPL-CCNC: 15 U/L (ref 0–39)
BASOPHILS ABSOLUTE: 0.09 K/UL (ref 0–0.2)
BASOPHILS RELATIVE PERCENT: 1 % (ref 0–2)
BILIRUB SERPL-MCNC: 0.3 MG/DL (ref 0–1.2)
BUN BLDV-MCNC: 16 MG/DL (ref 6–20)
CALCIUM SERPL-MCNC: 9.2 MG/DL (ref 8.6–10.2)
CHLORIDE BLD-SCNC: 100 MMOL/L (ref 98–107)
CHOLESTEROL, TOTAL: 183 MG/DL
CO2: 21 MMOL/L (ref 22–29)
CREAT SERPL-MCNC: 0.9 MG/DL (ref 0.7–1.2)
EOSINOPHILS ABSOLUTE: 0.31 K/UL (ref 0.05–0.5)
EOSINOPHILS RELATIVE PERCENT: 3 % (ref 0–6)
GFR, ESTIMATED: >90 ML/MIN/1.73M2
GLUCOSE BLD-MCNC: 130 MG/DL (ref 74–99)
HBA1C MFR BLD: 6.3 % (ref 4–5.6)
HCT VFR BLD CALC: 44.5 % (ref 37–54)
HDLC SERPL-MCNC: 36 MG/DL
HEMOGLOBIN: 14.2 G/DL (ref 12.5–16.5)
IMMATURE GRANULOCYTES %: 2 % (ref 0–5)
IMMATURE GRANULOCYTES ABSOLUTE: 0.2 K/UL (ref 0–0.58)
LDL CHOLESTEROL: 122 MG/DL
LYMPHOCYTES ABSOLUTE: 2.13 K/UL (ref 1.5–4)
LYMPHOCYTES RELATIVE PERCENT: 19 % (ref 20–42)
MCH RBC QN AUTO: 28.6 PG (ref 26–35)
MCHC RBC AUTO-ENTMCNC: 31.9 G/DL (ref 32–34.5)
MCV RBC AUTO: 89.7 FL (ref 80–99.9)
MONOCYTES ABSOLUTE: 0.79 K/UL (ref 0.1–0.95)
MONOCYTES RELATIVE PERCENT: 7 % (ref 2–12)
NEUTROPHILS ABSOLUTE: 7.56 K/UL (ref 1.8–7.3)
NEUTROPHILS RELATIVE PERCENT: 68 % (ref 43–80)
PDW BLD-RTO: 16.1 % (ref 11.5–15)
PLATELET # BLD: 275 K/UL (ref 130–450)
PMV BLD AUTO: 9.9 FL (ref 7–12)
POTASSIUM SERPL-SCNC: 4.7 MMOL/L (ref 3.5–5)
RBC # BLD: 4.96 M/UL (ref 3.8–5.8)
SODIUM BLD-SCNC: 136 MMOL/L (ref 132–146)
THYROXINE (T4): 7.5 UG/DL (ref 4.5–11.7)
TOTAL PROTEIN: 7.2 G/DL (ref 6.4–8.3)
TRIGL SERPL-MCNC: 123 MG/DL
TSH SERPL DL<=0.05 MIU/L-ACNC: 2.65 UIU/ML (ref 0.27–4.2)
VLDLC SERPL CALC-MCNC: 25 MG/DL
WBC # BLD: 11.1 K/UL (ref 4.5–11.5)

## 2024-10-18 ENCOUNTER — OFFICE VISIT (OUTPATIENT)
Dept: PRIMARY CARE CLINIC | Age: 59
End: 2024-10-18
Payer: COMMERCIAL

## 2024-10-18 VITALS
BODY MASS INDEX: 41.75 KG/M2 | WEIGHT: 315 LBS | OXYGEN SATURATION: 98 % | DIASTOLIC BLOOD PRESSURE: 80 MMHG | SYSTOLIC BLOOD PRESSURE: 130 MMHG | HEIGHT: 73 IN | HEART RATE: 100 BPM | TEMPERATURE: 97.6 F

## 2024-10-18 DIAGNOSIS — I87.2 VENOUS INSUFFICIENCY: ICD-10-CM

## 2024-10-18 DIAGNOSIS — M17.11 PRIMARY OSTEOARTHRITIS OF RIGHT KNEE: ICD-10-CM

## 2024-10-18 DIAGNOSIS — K21.9 GASTROESOPHAGEAL REFLUX DISEASE, UNSPECIFIED WHETHER ESOPHAGITIS PRESENT: ICD-10-CM

## 2024-10-18 DIAGNOSIS — E66.01 MORBID OBESITY: ICD-10-CM

## 2024-10-18 DIAGNOSIS — E11.9 TYPE 2 DIABETES MELLITUS WITHOUT COMPLICATION, WITHOUT LONG-TERM CURRENT USE OF INSULIN (HCC): Primary | ICD-10-CM

## 2024-10-18 PROCEDURE — 99214 OFFICE O/P EST MOD 30 MIN: CPT | Performed by: FAMILY MEDICINE

## 2024-10-18 PROCEDURE — G8417 CALC BMI ABV UP PARAM F/U: HCPCS | Performed by: FAMILY MEDICINE

## 2024-10-18 PROCEDURE — G8427 DOCREV CUR MEDS BY ELIG CLIN: HCPCS | Performed by: FAMILY MEDICINE

## 2024-10-18 PROCEDURE — G8484 FLU IMMUNIZE NO ADMIN: HCPCS | Performed by: FAMILY MEDICINE

## 2024-10-18 PROCEDURE — 2022F DILAT RTA XM EVC RTNOPTHY: CPT | Performed by: FAMILY MEDICINE

## 2024-10-18 PROCEDURE — 3017F COLORECTAL CA SCREEN DOC REV: CPT | Performed by: FAMILY MEDICINE

## 2024-10-18 PROCEDURE — 3044F HG A1C LEVEL LT 7.0%: CPT | Performed by: FAMILY MEDICINE

## 2024-10-18 PROCEDURE — 1036F TOBACCO NON-USER: CPT | Performed by: FAMILY MEDICINE

## 2024-10-18 NOTE — PROGRESS NOTES
10/14/2024    CHOL 158 06/21/2024     Lab Results   Component Value Date    TRIG 123 10/14/2024    TRIG 108 06/21/2024     Lab Results   Component Value Date    HDL 36 (L) 10/14/2024    HDL 32 (L) 06/21/2024     No components found for: \"LDLCHOLESTEROL\", \"LDLCALC\"  Lab Results   Component Value Date    VLDL 25 10/14/2024    VLDL 22 06/21/2024     Lab Results   Component Value Date    CHOLHDLRATIO 5.7 (H) 10/02/2017     Lab Results   Component Value Date    WBC 11.1 10/14/2024    HGB 14.2 10/14/2024    HCT 44.5 10/14/2024    MCV 89.7 10/14/2024     10/14/2024    LYMPHOPCT 19 (L) 10/14/2024    RBC 4.96 10/14/2024    MCH 28.6 10/14/2024    MCHC 31.9 (L) 10/14/2024    RDW 16.1 (H) 10/14/2024     Lab Results   Component Value Date     10/14/2024    K 4.7 10/14/2024     10/14/2024    CO2 21 (L) 10/14/2024    BUN 16 10/14/2024    CREATININE 0.9 10/14/2024    GLUCOSE 130 (H) 10/14/2024    CALCIUM 9.2 10/14/2024    BILITOT 0.3 10/14/2024    ALKPHOS 108 10/14/2024    AST 15 10/14/2024    ALT 18 10/14/2024    LABGLOM >90 10/14/2024    GFRAA >60 05/02/2022        Lab Results   Component Value Date    PSA 1.92 01/22/2024    PSA 1.63 05/09/2023    PSA 2.16 02/08/2023      Lab Results   Component Value Date    LABA1C 6.3 (H) 10/14/2024    LABA1C 6.0 (H) 06/21/2024    LABA1C 6.1 (H) 01/22/2024     No results found for: \"EAG\"      Plan Per Assessment:  Terrance was seen today for discuss medications and diabetes.    Diagnoses and all orders for this visit:    Type 2 diabetes mellitus without complication, without long-term current use of insulin (HCC)  -     Semaglutide, 1 MG/DOSE, (OZEMPIC) 4 MG/3ML SOPN sc injection; Inject 1 mg into the skin every 7 days  -     Comprehensive Metabolic Panel; Future  -     Hemoglobin A1C; Future    Morbid obesity  -     Semaglutide, 1 MG/DOSE, (OZEMPIC) 4 MG/3ML SOPN sc injection; Inject 1 mg into the skin every 7 days    Primary osteoarthritis of right knee    Gastroesophageal

## 2024-10-28 ENCOUNTER — OFFICE VISIT (OUTPATIENT)
Dept: FAMILY MEDICINE CLINIC | Age: 59
End: 2024-10-28
Payer: COMMERCIAL

## 2024-10-28 VITALS
DIASTOLIC BLOOD PRESSURE: 82 MMHG | OXYGEN SATURATION: 98 % | BODY MASS INDEX: 53.96 KG/M2 | TEMPERATURE: 98.4 F | SYSTOLIC BLOOD PRESSURE: 136 MMHG | HEART RATE: 105 BPM | WEIGHT: 315 LBS

## 2024-10-28 DIAGNOSIS — J01.90 ACUTE NON-RECURRENT SINUSITIS, UNSPECIFIED LOCATION: Primary | ICD-10-CM

## 2024-10-28 PROCEDURE — G8427 DOCREV CUR MEDS BY ELIG CLIN: HCPCS | Performed by: PHYSICIAN ASSISTANT

## 2024-10-28 PROCEDURE — 99213 OFFICE O/P EST LOW 20 MIN: CPT | Performed by: PHYSICIAN ASSISTANT

## 2024-10-28 PROCEDURE — 1036F TOBACCO NON-USER: CPT | Performed by: PHYSICIAN ASSISTANT

## 2024-10-28 PROCEDURE — 3017F COLORECTAL CA SCREEN DOC REV: CPT | Performed by: PHYSICIAN ASSISTANT

## 2024-10-28 PROCEDURE — G8417 CALC BMI ABV UP PARAM F/U: HCPCS | Performed by: PHYSICIAN ASSISTANT

## 2024-10-28 PROCEDURE — G8484 FLU IMMUNIZE NO ADMIN: HCPCS | Performed by: PHYSICIAN ASSISTANT

## 2024-10-28 RX ORDER — PREDNISONE 5 MG/1
TABLET ORAL
Qty: 30 TABLET | Refills: 0 | Status: SHIPPED | OUTPATIENT
Start: 2024-10-28

## 2024-10-28 RX ORDER — AMOXICILLIN 875 MG/1
875 TABLET, COATED ORAL 2 TIMES DAILY
Qty: 20 TABLET | Refills: 0 | Status: SHIPPED | OUTPATIENT
Start: 2024-10-28 | End: 2024-11-07

## 2024-10-28 NOTE — PROGRESS NOTES
10/28/24  Terrance Bell : 1965 Sex: male  Age 58 y.o.      Subjective:  Chief Complaint   Patient presents with    Sinusitis         HPI:   HPI  Terrance Bell , 58 y.o. male presents to express care for evaluation of sinus congestion, sinus infection.  The patient has a history of recurrent sinus infection.  The patient is here for symptoms that are not improving despite doing his over-the-counter treatments.  The patient has had the symptoms ongoing for 1 week.  No fevers.  The patient has noted occasional blood thick mucus from the nares.  The patient is not having a cough.  No fevers.        ROS:   Unless otherwise stated in this report the patient's positive and negative responses for review of systems for constitutional, eyes, ENT, cardiovascular, respiratory, gastrointestinal, neurological, , musculoskeletal, and integument systems and related systems to the presenting problem are either stated in the history of present illness or were not pertinent or were negative for the symptoms and/or complaints related to the presenting medical problem.  Positives and pertinent negatives as per HPI.  All others reviewed and are negative.      PMH:     Past Medical History:   Diagnosis Date    Arthritis     Asthma     Deviated nasal septum     Diabetes mellitus (HCC)     Hypertension     Prolonged emergence from general anesthesia        Past Surgical History:   Procedure Laterality Date    APPENDECTOMY      COLONOSCOPY N/A 04/15/2021    COLONOSCOPY WITH BIOPSY performed by Bird Brown DO at [facility] ENDOSCOPY    FOOT SURGERY Right     PILONIDAL CYST EXCISION      SINUS ENDOSCOPY Bilateral 2023    FUNCTIONAL ENDOSCOPIC SINUS SURGERY, SUBMUCOSAL RESECTION INFERIOR TURBINATES, SEPTOPLASTY performed by Bobby Jacobs DO at [facility] OR    TONSILLECTOMY      UPPER GASTROINTESTINAL ENDOSCOPY N/A 04/15/2021    EGD ESOPHAGOGASTRODUODENOSCOPY DILATATION performed by Bird Brown DO at [facility] ENDOSCOPY

## 2024-11-25 ENCOUNTER — OFFICE VISIT (OUTPATIENT)
Dept: PRIMARY CARE CLINIC | Age: 59
End: 2024-11-25
Payer: COMMERCIAL

## 2024-11-25 VITALS
TEMPERATURE: 98 F | WEIGHT: 315 LBS | OXYGEN SATURATION: 97 % | DIASTOLIC BLOOD PRESSURE: 80 MMHG | SYSTOLIC BLOOD PRESSURE: 128 MMHG | HEIGHT: 73 IN | HEART RATE: 92 BPM | BODY MASS INDEX: 41.75 KG/M2

## 2024-11-25 DIAGNOSIS — Z12.5 PROSTATE CANCER SCREENING: ICD-10-CM

## 2024-11-25 DIAGNOSIS — M17.11 PRIMARY OSTEOARTHRITIS OF RIGHT KNEE: ICD-10-CM

## 2024-11-25 DIAGNOSIS — E11.9 TYPE 2 DIABETES MELLITUS WITHOUT COMPLICATION, WITHOUT LONG-TERM CURRENT USE OF INSULIN (HCC): Primary | ICD-10-CM

## 2024-11-25 DIAGNOSIS — E29.1 HYPOTESTOSTERONEMIA IN MALE: ICD-10-CM

## 2024-11-25 DIAGNOSIS — E66.01 MORBID OBESITY: ICD-10-CM

## 2024-11-25 PROCEDURE — 3044F HG A1C LEVEL LT 7.0%: CPT | Performed by: FAMILY MEDICINE

## 2024-11-25 PROCEDURE — G8417 CALC BMI ABV UP PARAM F/U: HCPCS | Performed by: FAMILY MEDICINE

## 2024-11-25 PROCEDURE — 99214 OFFICE O/P EST MOD 30 MIN: CPT | Performed by: FAMILY MEDICINE

## 2024-11-25 PROCEDURE — 2022F DILAT RTA XM EVC RTNOPTHY: CPT | Performed by: FAMILY MEDICINE

## 2024-11-25 PROCEDURE — G8427 DOCREV CUR MEDS BY ELIG CLIN: HCPCS | Performed by: FAMILY MEDICINE

## 2024-11-25 PROCEDURE — 3017F COLORECTAL CA SCREEN DOC REV: CPT | Performed by: FAMILY MEDICINE

## 2024-11-25 PROCEDURE — G8484 FLU IMMUNIZE NO ADMIN: HCPCS | Performed by: FAMILY MEDICINE

## 2024-11-25 PROCEDURE — 1036F TOBACCO NON-USER: CPT | Performed by: FAMILY MEDICINE

## 2024-11-25 RX ORDER — GABAPENTIN 100 MG/1
100 CAPSULE ORAL NIGHTLY
Qty: 30 CAPSULE | Refills: 3 | Status: SHIPPED | OUTPATIENT
Start: 2024-11-25 | End: 2025-03-25

## 2024-11-25 NOTE — PROGRESS NOTES
24     Terrance Bell    : 1965 Sex: male   Age: 59 y.o.      Chief Complaint   Patient presents with    Joint Pain    Discuss Medications       Prior to Admission medications    Medication Sig Start Date End Date Taking? Authorizing Provider   gabapentin (NEURONTIN) 100 MG capsule Take 1 capsule by mouth nightly for 120 days. Intended supply: 30 days 11/25/24 3/25/25 Yes Jaziel Zapata DO   Semaglutide, 1 MG/DOSE, (OZEMPIC) 4 MG/3ML SOPN sc injection Inject 1 mg into the skin every 7 days 10/18/24  Yes Jaziel Zapata DO   lisinopril (PRINIVIL;ZESTRIL) 40 MG tablet 1 qd 24  Yes Jaziel Zapata,    azelastine HCl (ASTEPRO) 0.15 % SOLN by Nasal route   Yes ProviderRichard MD   ASPIRIN 81 PO Take by mouth daily   Yes Richard Abraham MD   ciclopirox (LOPROX) 0.77 % cream Apply topically 2 times daily Apply topically 2 times daily.   Yes ProviderRichard MD   montelukast (SINGULAIR) 10 MG tablet Take 1 tablet by mouth nightly   Yes ProviderRichard MD          HPI: Patient evaluated today with diabetes low testosterone hypertension all very stable at this time.  Medications well-tolerated we will maintain as prescribed.  Systems review stable.          Review of Systems   Constitutional: Negative.    HENT: Negative.     Eyes: Negative.    Respiratory: Negative.     Gastrointestinal: Negative.    Endocrine: Negative.    Genitourinary: Negative.    Musculoskeletal: Negative.    Skin: Negative.    Allergic/Immunologic: Negative.    Neurological: Negative.    Hematological: Negative.    Psychiatric/Behavioral: Negative.                Current Outpatient Medications:     gabapentin (NEURONTIN) 100 MG capsule, Take 1 capsule by mouth nightly for 120 days. Intended supply: 30 days, Disp: 30 capsule, Rfl: 3    Semaglutide, 1 MG/DOSE, (OZEMPIC) 4 MG/3ML SOPN sc injection, Inject 1 mg into the skin every 7 days, Disp: 3 mL, Rfl: 2    lisinopril (PRINIVIL;ZESTRIL) 40 MG tablet, 1

## 2024-12-02 ENCOUNTER — TELEPHONE (OUTPATIENT)
Dept: ENT CLINIC | Age: 59
End: 2024-12-02

## 2024-12-02 NOTE — TELEPHONE ENCOUNTER
Patient scheduled with Dr. Jacobs 12/3/24    Electronically signed by Catia Molina MA on 12/2/24 at 10:22 AM EST

## 2024-12-02 NOTE — TELEPHONE ENCOUNTER
Pt called stating that he has a sinus infection and would like an appointment.  did a surgery last winter and Pt says he's feeling the same symptoms as before.     Please advise on scheduling.    Electronically signed by Kalie Osei on 12/2/2024 at 8:07 AM

## 2024-12-03 ENCOUNTER — OFFICE VISIT (OUTPATIENT)
Dept: ENT CLINIC | Age: 59
End: 2024-12-03
Payer: COMMERCIAL

## 2024-12-03 VITALS
OXYGEN SATURATION: 98 % | TEMPERATURE: 98 F | BODY MASS INDEX: 41.75 KG/M2 | HEIGHT: 73 IN | WEIGHT: 315 LBS | SYSTOLIC BLOOD PRESSURE: 124 MMHG | DIASTOLIC BLOOD PRESSURE: 72 MMHG | HEART RATE: 107 BPM

## 2024-12-03 DIAGNOSIS — J01.91 ACUTE RECURRENT SINUSITIS, UNSPECIFIED LOCATION: ICD-10-CM

## 2024-12-03 DIAGNOSIS — Z98.890 S/P FESS (FUNCTIONAL ENDOSCOPIC SINUS SURGERY): ICD-10-CM

## 2024-12-03 DIAGNOSIS — J32.4 CHRONIC PANSINUSITIS: Primary | ICD-10-CM

## 2024-12-03 PROCEDURE — 1036F TOBACCO NON-USER: CPT | Performed by: OTOLARYNGOLOGY

## 2024-12-03 PROCEDURE — G8417 CALC BMI ABV UP PARAM F/U: HCPCS | Performed by: OTOLARYNGOLOGY

## 2024-12-03 PROCEDURE — 99213 OFFICE O/P EST LOW 20 MIN: CPT | Performed by: OTOLARYNGOLOGY

## 2024-12-03 PROCEDURE — 3017F COLORECTAL CA SCREEN DOC REV: CPT | Performed by: OTOLARYNGOLOGY

## 2024-12-03 PROCEDURE — G8427 DOCREV CUR MEDS BY ELIG CLIN: HCPCS | Performed by: OTOLARYNGOLOGY

## 2024-12-03 PROCEDURE — G8484 FLU IMMUNIZE NO ADMIN: HCPCS | Performed by: OTOLARYNGOLOGY

## 2024-12-03 RX ORDER — CLINDAMYCIN HYDROCHLORIDE 300 MG/1
300 CAPSULE ORAL 3 TIMES DAILY
Qty: 21 CAPSULE | Refills: 0 | Status: SHIPPED | OUTPATIENT
Start: 2024-12-03 | End: 2024-12-10

## 2024-12-03 RX ORDER — PREDNISONE 5 MG/1
5 TABLET ORAL DAILY
Qty: 30 TABLET | Refills: 0 | Status: SHIPPED | OUTPATIENT
Start: 2024-12-03

## 2024-12-03 ASSESSMENT — ENCOUNTER SYMPTOMS
SINUS PAIN: 1
EYES NEGATIVE: 1
SINUS PRESSURE: 1
RHINORRHEA: 1
ABDOMINAL PAIN: 0
COLOR CHANGE: 0
GASTROINTESTINAL NEGATIVE: 1
RESPIRATORY NEGATIVE: 1
SHORTNESS OF BREATH: 0

## 2024-12-03 NOTE — PROGRESS NOTES
Subjective:      Patient ID:  Terrance Bell is a 59 y.o. male.    HPI:  Pt returns for recheck of allergies.  Possible sinus infection with associated drainage with mild pain ad pressure.  He reports being prescribed amoxicillin and prednisone approximately one month ago for sinus infection. He is using astepro BID and singular nightly. No longer using Flonase as PCP directed per patient. CT sinus 4/2024 bl max sinusitis left greater than right.     History of   FESS, septo, and SMRITS  When? November year: 2023    Nasal Steroid: no   Name: no   Still taking: no       Other therapy:   Astelin- yes  oral antihistamine- none  leukotriene inhibitor- none  oral decongestant- none    which has been  somewhat effective     The patient is complaining of nasal congestion and rhinorrhea    The patients worst time of year is all seasons      Patient's medications, allergies, past medical, surgical, social and family histories were reviewed and updated as appropriate.        Review of Systems   Constitutional: Negative.  Negative for fever.   HENT:  Positive for congestion, rhinorrhea, sinus pressure and sinus pain. Negative for postnasal drip and sneezing.    Eyes: Negative.  Negative for visual disturbance.   Respiratory: Negative.  Negative for shortness of breath.    Cardiovascular: Negative.  Negative for chest pain.   Gastrointestinal: Negative.  Negative for abdominal pain.   Genitourinary: Negative.    Musculoskeletal: Negative.    Skin: Negative.  Negative for color change.   Allergic/Immunologic: Positive for environmental allergies.   Neurological: Negative.    Psychiatric/Behavioral: Negative.  Negative for behavioral problems and hallucinations.    All other systems reviewed and are negative.              Objective:     Vitals:    12/03/24 0740   BP: 124/72   Pulse: (!) 107   Temp: 98 °F (36.7 °C)   SpO2: 98%     Physical Exam  Vitals and nursing note reviewed.   Constitutional:       Appearance: He is

## 2025-01-02 DIAGNOSIS — Z12.5 PROSTATE CANCER SCREENING: ICD-10-CM

## 2025-01-02 DIAGNOSIS — E11.9 TYPE 2 DIABETES MELLITUS WITHOUT COMPLICATION, WITHOUT LONG-TERM CURRENT USE OF INSULIN (HCC): ICD-10-CM

## 2025-01-02 DIAGNOSIS — E29.1 HYPOTESTOSTERONEMIA IN MALE: ICD-10-CM

## 2025-01-02 LAB
ALBUMIN: 3.9 G/DL (ref 3.5–5.2)
ALP BLD-CCNC: 83 U/L (ref 40–129)
ALT SERPL-CCNC: 17 U/L (ref 0–40)
ANION GAP SERPL CALCULATED.3IONS-SCNC: 12 MMOL/L (ref 7–16)
AST SERPL-CCNC: 17 U/L (ref 0–39)
BILIRUB SERPL-MCNC: 0.4 MG/DL (ref 0–1.2)
BUN BLDV-MCNC: 8 MG/DL (ref 6–20)
CALCIUM SERPL-MCNC: 9 MG/DL (ref 8.6–10.2)
CHLORIDE BLD-SCNC: 104 MMOL/L (ref 98–107)
CO2: 19 MMOL/L (ref 22–29)
CREAT SERPL-MCNC: 0.8 MG/DL (ref 0.7–1.2)
GFR, ESTIMATED: >90 ML/MIN/1.73M2
GLUCOSE BLD-MCNC: 100 MG/DL (ref 74–99)
HBA1C MFR BLD: 5.6 % (ref 4–5.6)
POTASSIUM SERPL-SCNC: 4.4 MMOL/L (ref 3.5–5)
PROSTATE SPECIFIC ANTIGEN: 2.41 NG/ML (ref 0–4)
SODIUM BLD-SCNC: 135 MMOL/L (ref 132–146)
TESTOSTERONE TOTAL: 276 NG/DL (ref 193–740)
TOTAL PROTEIN: 6.8 G/DL (ref 6.4–8.3)

## 2025-01-06 ENCOUNTER — OFFICE VISIT (OUTPATIENT)
Dept: PRIMARY CARE CLINIC | Age: 60
End: 2025-01-06
Payer: COMMERCIAL

## 2025-01-06 VITALS
WEIGHT: 315 LBS | SYSTOLIC BLOOD PRESSURE: 130 MMHG | BODY MASS INDEX: 41.75 KG/M2 | DIASTOLIC BLOOD PRESSURE: 80 MMHG | HEIGHT: 73 IN | OXYGEN SATURATION: 96 % | HEART RATE: 84 BPM | TEMPERATURE: 98.4 F

## 2025-01-06 DIAGNOSIS — E29.1 HYPOTESTOSTERONEMIA IN MALE: ICD-10-CM

## 2025-01-06 DIAGNOSIS — E66.01 MORBID OBESITY: ICD-10-CM

## 2025-01-06 DIAGNOSIS — E11.9 TYPE 2 DIABETES MELLITUS WITHOUT COMPLICATION, WITHOUT LONG-TERM CURRENT USE OF INSULIN (HCC): Primary | ICD-10-CM

## 2025-01-06 DIAGNOSIS — M17.11 PRIMARY OSTEOARTHRITIS OF RIGHT KNEE: ICD-10-CM

## 2025-01-06 DIAGNOSIS — K21.9 GASTROESOPHAGEAL REFLUX DISEASE, UNSPECIFIED WHETHER ESOPHAGITIS PRESENT: ICD-10-CM

## 2025-01-06 PROCEDURE — M1308 PR FLU IMMUNIZE NO ADMIN: HCPCS | Performed by: FAMILY MEDICINE

## 2025-01-06 PROCEDURE — G8417 CALC BMI ABV UP PARAM F/U: HCPCS | Performed by: FAMILY MEDICINE

## 2025-01-06 PROCEDURE — 99214 OFFICE O/P EST MOD 30 MIN: CPT | Performed by: FAMILY MEDICINE

## 2025-01-06 PROCEDURE — G8427 DOCREV CUR MEDS BY ELIG CLIN: HCPCS | Performed by: FAMILY MEDICINE

## 2025-01-06 PROCEDURE — 3044F HG A1C LEVEL LT 7.0%: CPT | Performed by: FAMILY MEDICINE

## 2025-01-06 PROCEDURE — 1036F TOBACCO NON-USER: CPT | Performed by: FAMILY MEDICINE

## 2025-01-06 PROCEDURE — 2022F DILAT RTA XM EVC RTNOPTHY: CPT | Performed by: FAMILY MEDICINE

## 2025-01-06 PROCEDURE — 3017F COLORECTAL CA SCREEN DOC REV: CPT | Performed by: FAMILY MEDICINE

## 2025-01-06 ASSESSMENT — PATIENT HEALTH QUESTIONNAIRE - PHQ9
SUM OF ALL RESPONSES TO PHQ QUESTIONS 1-9: 0
SUM OF ALL RESPONSES TO PHQ QUESTIONS 1-9: 0
SUM OF ALL RESPONSES TO PHQ9 QUESTIONS 1 & 2: 0
SUM OF ALL RESPONSES TO PHQ QUESTIONS 1-9: 0
SUM OF ALL RESPONSES TO PHQ QUESTIONS 1-9: 0
1. LITTLE INTEREST OR PLEASURE IN DOING THINGS: NOT AT ALL
2. FEELING DOWN, DEPRESSED OR HOPELESS: NOT AT ALL

## 2025-01-16 ENCOUNTER — TELEPHONE (OUTPATIENT)
Dept: ENT CLINIC | Age: 60
End: 2025-01-16

## 2025-01-16 NOTE — TELEPHONE ENCOUNTER
Pt cancelled 01/13/2025 appt with infectious disease Dr. Kimble. M requesting Pt to reschedule. Pt F/U with ENT is 02/04/2025.

## 2025-01-24 ENCOUNTER — HOSPITAL ENCOUNTER (OUTPATIENT)
Dept: CT IMAGING | Age: 60
Discharge: HOME | End: 2025-01-26
Payer: COMMERCIAL

## 2025-01-24 DIAGNOSIS — R91.8 ABNORMAL CT LUNG SCREENING: ICD-10-CM

## 2025-01-24 PROCEDURE — 71250 CT THORAX DX C-: CPT

## 2025-02-04 ENCOUNTER — OFFICE VISIT (OUTPATIENT)
Dept: ENT CLINIC | Age: 60
End: 2025-02-04
Payer: COMMERCIAL

## 2025-02-04 VITALS
TEMPERATURE: 97.5 F | OXYGEN SATURATION: 94 % | HEIGHT: 73 IN | BODY MASS INDEX: 41.75 KG/M2 | WEIGHT: 315 LBS | SYSTOLIC BLOOD PRESSURE: 102 MMHG | DIASTOLIC BLOOD PRESSURE: 68 MMHG | HEART RATE: 89 BPM

## 2025-02-04 DIAGNOSIS — J30.1 SEASONAL ALLERGIC RHINITIS DUE TO POLLEN: Primary | ICD-10-CM

## 2025-02-04 DIAGNOSIS — R09.81 NASAL CONGESTION: ICD-10-CM

## 2025-02-04 DIAGNOSIS — J32.4 CHRONIC PANSINUSITIS: ICD-10-CM

## 2025-02-04 DIAGNOSIS — J32.0 CHRONIC MAXILLARY SINUSITIS: ICD-10-CM

## 2025-02-04 DIAGNOSIS — Z98.890 S/P FESS (FUNCTIONAL ENDOSCOPIC SINUS SURGERY): ICD-10-CM

## 2025-02-04 PROCEDURE — G8427 DOCREV CUR MEDS BY ELIG CLIN: HCPCS | Performed by: OTOLARYNGOLOGY

## 2025-02-04 PROCEDURE — 99213 OFFICE O/P EST LOW 20 MIN: CPT | Performed by: OTOLARYNGOLOGY

## 2025-02-04 PROCEDURE — 3017F COLORECTAL CA SCREEN DOC REV: CPT | Performed by: OTOLARYNGOLOGY

## 2025-02-04 PROCEDURE — 1036F TOBACCO NON-USER: CPT | Performed by: OTOLARYNGOLOGY

## 2025-02-04 PROCEDURE — G8417 CALC BMI ABV UP PARAM F/U: HCPCS | Performed by: OTOLARYNGOLOGY

## 2025-02-04 ASSESSMENT — ENCOUNTER SYMPTOMS
GASTROINTESTINAL NEGATIVE: 1
EYES NEGATIVE: 1
RHINORRHEA: 1
SHORTNESS OF BREATH: 0
COLOR CHANGE: 0
RESPIRATORY NEGATIVE: 1
SINUS PAIN: 1
ABDOMINAL PAIN: 0
SINUS PRESSURE: 1

## 2025-02-04 NOTE — PROGRESS NOTES
Subjective:      Patient ID:  Terrance Bell is a 59 y.o. male.    HPI:  Pt returns for recheck of allergies. 2/4/25: Here today for follow up on continued sinus infection since surgery in 11/2023. Has experienced one sinus infection since our last visit but not treated with abx. Lasted about 1 week of facial pain and pressure. Has appt with infection disease 2/10/25 for eval.    History of   FESS, septo, and SMRITS  When? November year: 2023    Nasal Steroid: no   Name: no   Still taking: no       Other therapy:   Astelin- yes  oral antihistamine- none  leukotriene inhibitor- none  oral decongestant- none        Patient's medications, allergies, past medical, surgical, social and family histories were reviewed and updated as appropriate.        Review of Systems   Constitutional: Negative.  Negative for fever.   HENT:  Positive for congestion, rhinorrhea, sinus pressure and sinus pain. Negative for postnasal drip and sneezing.    Eyes: Negative.  Negative for visual disturbance.   Respiratory: Negative.  Negative for shortness of breath.    Cardiovascular: Negative.  Negative for chest pain.   Gastrointestinal: Negative.  Negative for abdominal pain.   Genitourinary: Negative.    Musculoskeletal: Negative.    Skin: Negative.  Negative for color change.   Allergic/Immunologic: Positive for environmental allergies.   Neurological: Negative.    Psychiatric/Behavioral: Negative.  Negative for behavioral problems and hallucinations.    All other systems reviewed and are negative.              Objective:     Vitals:    02/04/25 0843   BP: 102/68   Pulse: 89   Temp: 97.5 °F (36.4 °C)   SpO2: 94%     Physical Exam  Vitals and nursing note reviewed.   Constitutional:       Appearance: He is well-developed.   HENT:      Head: Normocephalic and atraumatic.      Right Ear: Hearing, tympanic membrane, ear canal and external ear normal.      Left Ear: Hearing, tympanic membrane and external ear normal. No foreign body.      Nose:

## 2025-02-10 ENCOUNTER — APPOINTMENT (OUTPATIENT)
Dept: RADIOLOGY | Facility: HOSPITAL | Age: 60
End: 2025-02-10
Payer: COMMERCIAL

## 2025-02-10 ENCOUNTER — HOSPITAL ENCOUNTER (EMERGENCY)
Facility: HOSPITAL | Age: 60
Discharge: HOME | End: 2025-02-11
Attending: STUDENT IN AN ORGANIZED HEALTH CARE EDUCATION/TRAINING PROGRAM
Payer: COMMERCIAL

## 2025-02-10 ENCOUNTER — APPOINTMENT (OUTPATIENT)
Dept: CARDIOLOGY | Facility: HOSPITAL | Age: 60
End: 2025-02-10
Payer: COMMERCIAL

## 2025-02-10 DIAGNOSIS — S06.5XAA SUBDURAL HEMATOMA (MULTI): ICD-10-CM
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
## 2025-02-10 DIAGNOSIS — S06.0X9A CONCUSSION WITH LOSS OF CONSCIOUSNESS, INITIAL ENCOUNTER: ICD-10-CM

## 2025-02-10 LAB
ABO GROUP (TYPE) IN BLOOD: NORMAL
ALBUMIN SERPL BCP-MCNC: 4.3 G/DL (ref 3.4–5)
ALP SERPL-CCNC: 68 U/L (ref 33–120)
ALT SERPL W P-5'-P-CCNC: 18 U/L (ref 10–52)
ANION GAP SERPL CALC-SCNC: 12 MMOL/L (ref 10–20)
ANTIBODY SCREEN: NORMAL
AST SERPL W P-5'-P-CCNC: 15 U/L (ref 9–39)
BASOPHILS # BLD AUTO: 0.05 X10*3/UL (ref 0–0.1)
BASOPHILS NFR BLD AUTO: 0.6 %
BILIRUB SERPL-MCNC: 0.5 MG/DL (ref 0–1.2)
BUN SERPL-MCNC: 14 MG/DL (ref 6–23)
CALCIUM SERPL-MCNC: 9.1 MG/DL (ref 8.6–10.3)
CARDIAC TROPONIN I PNL SERPL HS: <3 NG/L (ref 0–20)
CHLORIDE SERPL-SCNC: 104 MMOL/L (ref 98–107)
CO2 SERPL-SCNC: 25 MMOL/L (ref 21–32)
CREAT SERPL-MCNC: 0.94 MG/DL (ref 0.5–1.3)
EGFRCR SERPLBLD CKD-EPI 2021: >90 ML/MIN/1.73M*2
EOSINOPHIL # BLD AUTO: 0.36 X10*3/UL (ref 0–0.7)
EOSINOPHIL NFR BLD AUTO: 4.3 %
ERYTHROCYTE [DISTWIDTH] IN BLOOD BY AUTOMATED COUNT: 14.6 % (ref 11.5–14.5)
ETHANOL SERPL-MCNC: <10 MG/DL
GLUCOSE SERPL-MCNC: 121 MG/DL (ref 74–99)
HCT VFR BLD AUTO: 39.9 % (ref 41–52)
HGB BLD-MCNC: 13 G/DL (ref 13.5–17.5)
IMM GRANULOCYTES # BLD AUTO: 0.05 X10*3/UL (ref 0–0.7)
IMM GRANULOCYTES NFR BLD AUTO: 0.6 % (ref 0–0.9)
INR PPP: 1.3 (ref 0.9–1.1)
LYMPHOCYTES # BLD AUTO: 1.56 X10*3/UL (ref 1.2–4.8)
LYMPHOCYTES NFR BLD AUTO: 18.6 %
MCH RBC QN AUTO: 28.4 PG (ref 26–34)
MCHC RBC AUTO-ENTMCNC: 32.6 G/DL (ref 32–36)
MCV RBC AUTO: 87 FL (ref 80–100)
MONOCYTES # BLD AUTO: 0.45 X10*3/UL (ref 0.1–1)
MONOCYTES NFR BLD AUTO: 5.4 %
NEUTROPHILS # BLD AUTO: 5.93 X10*3/UL (ref 1.2–7.7)
NEUTROPHILS NFR BLD AUTO: 70.5 %
NRBC BLD-RTO: 0 /100 WBCS (ref 0–0)
PLATELET # BLD AUTO: 272 X10*3/UL (ref 150–450)
POTASSIUM SERPL-SCNC: 3.7 MMOL/L (ref 3.5–5.3)
PROT SERPL-MCNC: 7.1 G/DL (ref 6.4–8.2)
PROTHROMBIN TIME: 14.2 SECONDS (ref 9.8–12.8)
RBC # BLD AUTO: 4.58 X10*6/UL (ref 4.5–5.9)
RH FACTOR (ANTIGEN D): NORMAL
SODIUM SERPL-SCNC: 137 MMOL/L (ref 136–145)
WBC # BLD AUTO: 8.4 X10*3/UL (ref 4.4–11.3)

## 2025-02-10 PROCEDURE — 71045 X-RAY EXAM CHEST 1 VIEW: CPT

## 2025-02-10 PROCEDURE — 85610 PROTHROMBIN TIME: CPT | Performed by: STUDENT IN AN ORGANIZED HEALTH CARE EDUCATION/TRAINING PROGRAM

## 2025-02-10 PROCEDURE — 99285 EMERGENCY DEPT VISIT HI MDM: CPT | Mod: 25

## 2025-02-10 PROCEDURE — 71045 X-RAY EXAM CHEST 1 VIEW: CPT | Mod: FOREIGN READ | Performed by: RADIOLOGY

## 2025-02-10 PROCEDURE — 85025 COMPLETE CBC W/AUTO DIFF WBC: CPT | Performed by: STUDENT IN AN ORGANIZED HEALTH CARE EDUCATION/TRAINING PROGRAM

## 2025-02-10 PROCEDURE — 72131 CT LUMBAR SPINE W/O DYE: CPT

## 2025-02-10 PROCEDURE — 73110 X-RAY EXAM OF WRIST: CPT | Mod: LT

## 2025-02-10 PROCEDURE — 72125 CT NECK SPINE W/O DYE: CPT

## 2025-02-10 PROCEDURE — 36415 COLL VENOUS BLD VENIPUNCTURE: CPT | Performed by: STUDENT IN AN ORGANIZED HEALTH CARE EDUCATION/TRAINING PROGRAM

## 2025-02-10 PROCEDURE — 82077 ASSAY SPEC XCP UR&BREATH IA: CPT | Performed by: STUDENT IN AN ORGANIZED HEALTH CARE EDUCATION/TRAINING PROGRAM

## 2025-02-10 PROCEDURE — 84484 ASSAY OF TROPONIN QUANT: CPT | Performed by: STUDENT IN AN ORGANIZED HEALTH CARE EDUCATION/TRAINING PROGRAM

## 2025-02-10 PROCEDURE — 72131 CT LUMBAR SPINE W/O DYE: CPT | Mod: FOREIGN READ | Performed by: RADIOLOGY

## 2025-02-10 PROCEDURE — 93005 ELECTROCARDIOGRAM TRACING: CPT

## 2025-02-10 PROCEDURE — 72125 CT NECK SPINE W/O DYE: CPT | Performed by: STUDENT IN AN ORGANIZED HEALTH CARE EDUCATION/TRAINING PROGRAM

## 2025-02-10 PROCEDURE — 80053 COMPREHEN METABOLIC PANEL: CPT | Performed by: STUDENT IN AN ORGANIZED HEALTH CARE EDUCATION/TRAINING PROGRAM

## 2025-02-10 PROCEDURE — 86900 BLOOD TYPING SEROLOGIC ABO: CPT | Performed by: STUDENT IN AN ORGANIZED HEALTH CARE EDUCATION/TRAINING PROGRAM

## 2025-02-10 PROCEDURE — 70450 CT HEAD/BRAIN W/O DYE: CPT

## 2025-02-10 PROCEDURE — 70450 CT HEAD/BRAIN W/O DYE: CPT | Performed by: STUDENT IN AN ORGANIZED HEALTH CARE EDUCATION/TRAINING PROGRAM

## 2025-02-10 PROCEDURE — 99285 EMERGENCY DEPT VISIT HI MDM: CPT | Mod: 25 | Performed by: STUDENT IN AN ORGANIZED HEALTH CARE EDUCATION/TRAINING PROGRAM

## 2025-02-10 ASSESSMENT — LIFESTYLE VARIABLES
HAVE YOU EVER FELT YOU SHOULD CUT DOWN ON YOUR DRINKING: NO
TOTAL SCORE: 0
EVER FELT BAD OR GUILTY ABOUT YOUR DRINKING: NO
HAVE PEOPLE ANNOYED YOU BY CRITICIZING YOUR DRINKING: NO
EVER HAD A DRINK FIRST THING IN THE MORNING TO STEADY YOUR NERVES TO GET RID OF A HANGOVER: NO

## 2025-02-10 ASSESSMENT — PAIN SCALES - GENERAL
PAINLEVEL_OUTOF10: 0 - NO PAIN
PAINLEVEL_OUTOF10: 6
PAINLEVEL_OUTOF10: 6

## 2025-02-10 ASSESSMENT — PAIN - FUNCTIONAL ASSESSMENT
PAIN_FUNCTIONAL_ASSESSMENT: 0-10
PAIN_FUNCTIONAL_ASSESSMENT: 0-10

## 2025-02-10 ASSESSMENT — PAIN DESCRIPTION - LOCATION: LOCATION: BACK

## 2025-02-10 ASSESSMENT — PAIN DESCRIPTION - PAIN TYPE: TYPE: ACUTE PAIN

## 2025-02-10 ASSESSMENT — COLUMBIA-SUICIDE SEVERITY RATING SCALE - C-SSRS
2. HAVE YOU ACTUALLY HAD ANY THOUGHTS OF KILLING YOURSELF?: NO
6. HAVE YOU EVER DONE ANYTHING, STARTED TO DO ANYTHING, OR PREPARED TO DO ANYTHING TO END YOUR LIFE?: NO
1. IN THE PAST MONTH, HAVE YOU WISHED YOU WERE DEAD OR WISHED YOU COULD GO TO SLEEP AND NOT WAKE UP?: NO

## 2025-02-10 ASSESSMENT — PAIN DESCRIPTION - ORIENTATION: ORIENTATION: LOWER

## 2025-02-10 NOTE — Clinical Note
Guillaume Barrera was seen and treated in our emergency department on 2/10/2025.  He may return to work on 02/15/2025.       If you have any questions or concerns, please don't hesitate to call.      Holly Richmond MD

## 2025-02-11 ENCOUNTER — APPOINTMENT (OUTPATIENT)
Dept: RADIOLOGY | Facility: HOSPITAL | Age: 60
End: 2025-02-11
Payer: COMMERCIAL

## 2025-02-11 ENCOUNTER — TELEPHONE (OUTPATIENT)
Dept: PRIMARY CARE CLINIC | Age: 60
End: 2025-02-11

## 2025-02-11 VITALS
DIASTOLIC BLOOD PRESSURE: 67 MMHG | SYSTOLIC BLOOD PRESSURE: 155 MMHG | BODY MASS INDEX: 41.75 KG/M2 | WEIGHT: 315 LBS | HEIGHT: 73 IN | OXYGEN SATURATION: 98 % | RESPIRATION RATE: 16 BRPM | HEART RATE: 92 BPM | TEMPERATURE: 97.9 F

## 2025-02-11 PROCEDURE — 70450 CT HEAD/BRAIN W/O DYE: CPT

## 2025-02-11 NOTE — PROGRESS NOTES
I have accept care of this patient in signout.    In summary:  I received this patient in signout in summary is a 59-year-old male presents emerged part after a fall.  His CT showed that he had concerns for subdural.  Prior provider spoke with neurosurgery who recommended 6-hour scan spoke with trauma surgery who was agreeable with plan and if unchanged was okay with discharge.  Repeat CT scan shows a stable subdural.  At this time patient safe for discharge verbal today's return precautions.  To follow-up with his primary care provider for concussion protocol as well.      Labs Reviewed   CBC WITH AUTO DIFFERENTIAL - Abnormal       Result Value    WBC 8.4      nRBC 0.0      RBC 4.58      Hemoglobin 13.0 (*)     Hematocrit 39.9 (*)     MCV 87      MCH 28.4      MCHC 32.6      RDW 14.6 (*)     Platelets 272      Neutrophils % 70.5      Immature Granulocytes %, Automated 0.6      Lymphocytes % 18.6      Monocytes % 5.4      Eosinophils % 4.3      Basophils % 0.6      Neutrophils Absolute 5.93      Immature Granulocytes Absolute, Automated 0.05      Lymphocytes Absolute 1.56      Monocytes Absolute 0.45      Eosinophils Absolute 0.36      Basophils Absolute 0.05     COMPREHENSIVE METABOLIC PANEL - Abnormal    Glucose 121 (*)     Sodium 137      Potassium 3.7      Chloride 104      Bicarbonate 25      Anion Gap 12      Urea Nitrogen 14      Creatinine 0.94      eGFR >90      Calcium 9.1      Albumin 4.3      Alkaline Phosphatase 68      Total Protein 7.1      AST 15      Bilirubin, Total 0.5      ALT 18     PROTIME-INR - Abnormal    Protime 14.2 (*)     INR 1.3 (*)    ALCOHOL - Normal    Alcohol <10     TROPONIN I, HIGH SENSITIVITY - Normal    Troponin I, High Sensitivity <3      Narrative:     Less than 99th percentile of normal range cutoff-  Female and children under 18 years old <14 ng/L; Male <21 ng/L: Negative  Repeat testing should be performed if clinically indicated.     Female and children under 18 years old  14-50 ng/L; Male 21-50 ng/L:  Consistent with possible cardiac damage and possible increased clinical   risk. Serial measurements may help to assess extent of myocardial damage.     >50 ng/L: Consistent with cardiac damage, increased clinical risk and  myocardial infarction. Serial measurements may help assess extent of   myocardial damage.      NOTE: Children less than 1 year old may have higher baseline troponin   levels and results should be interpreted in conjunction with the overall   clinical context.     NOTE: Troponin I testing is performed using a different   testing methodology at [facility] than at other   [facility] hospitals. Direct result comparisons should only   be made within the same method.   TYPE AND SCREEN    ABO TYPE O      Rh TYPE POS      ANTIBODY SCREEN NEG       CT head wo IV contrast   Final Result   Stable exam.             MACRO:   None.        Signed by: Otilio Granda 2/11/2025 3:22 AM   Dictation workstation:   [id number]      XR wrist left 3+ views   Final Result   No acute bony abnormalities on x-ray examination of the left wrist.   Mild degenerative joint disease.   Signed by Sunita Almeida DO      CT head W O contrast trauma protocol   Final Result   CT HEAD:   1. Subtle acute subdural hematoma, measuring 3-4 mm in width, is   layering along the left lateral aspect of the interhemispheric falx   with minimal if any mass effect on the adjacent brain parenchyma. No   additional areas of the subdural hemorrhage are identified. There is   no evidence of intraparenchymal, intraventricular, or subarachnoid   hemorrhage present at this time.   2. Subtle attenuation changes in the periventricular and subcortical   white matter of bilateral cerebral hemispheres are nonspecific,   favored to represent chronic sequela of microvascular disease.        CT C-SPINE:   1. No evidence of acute trauma within cervical spine within limits of   some motion and photon starvation from patient's  body habitus.   2. Multilevel degenerative changes of the cervical spine as detailed   above.        MACRO:   None        Signed by: Santos Martinez 2/10/2025 8:25 PM   Dictation workstation:   [id number]      CT cervical spine wo IV contrast   Final Result   CT HEAD:   1. Subtle acute subdural hematoma, measuring 3-4 mm in width, is   layering along the left lateral aspect of the interhemispheric falx   with minimal if any mass effect on the adjacent brain parenchyma. No   additional areas of the subdural hemorrhage are identified. There is   no evidence of intraparenchymal, intraventricular, or subarachnoid   hemorrhage present at this time.   2. Subtle attenuation changes in the periventricular and subcortical   white matter of bilateral cerebral hemispheres are nonspecific,   favored to represent chronic sequela of microvascular disease.        CT C-SPINE:   1. No evidence of acute trauma within cervical spine within limits of   some motion and photon starvation from patient's body habitus.   2. Multilevel degenerative changes of the cervical spine as detailed   above.        MACRO:   None        Signed by: Santos Martinez 2/10/2025 8:25 PM   Dictation workstation:   [id number]      CT lumbar spine wo IV contrast   Final Result   1.Grade 1 anterolisthesis L4-L5 with bilateral spondylytic   defect.   2.Advanced disc disease L4-L5 and L5-S1 with mild central   stenosis and moderate foraminal stenosis.   3.Mild stenosis L3-L4.   4.These findings could be better assessed with an MRI.   Signed by Dominic Perez DO      XR chest 1 view   Final Result   No acute cardiopulmonary disease.   Signed by Kade Harrington MD

## 2025-02-11 NOTE — TELEPHONE ENCOUNTER
17th is okay.  If he is having problems with worsening confusion he actually needs to be seen again through the hospital and a repeat CT scan of the head.

## 2025-02-11 NOTE — ED PROVIDER NOTES
HPI   No chief complaint on file.      59-year-old male presents to ED after a fall.  Patient was outside when his daughter heard the patient fall and hit the ground.  She came over to find the patient on the ground.  Patient does not remember the event.  He is having pain to his lumbar spine area and his left wrist.  Denies neck pain, chest pain, shortness of breath or abdominal pain.  No pain to the lower extremities.              Patient History   No past medical history on file.  No past surgical history on file.  No family history on file.  Social History     Tobacco Use    Smoking status: Not on file    Smokeless tobacco: Not on file   Substance Use Topics    Alcohol use: Not on file    Drug use: Not on file       Physical Exam   ED Triage Vitals [02/10/25 1935]   Temperature Heart Rate Respirations BP   36.6 °C (97.9 °F) 88 20 (!) 122/92      Pulse Ox Temp Source Heart Rate Source Patient Position   100 % Skin -- --      BP Location FiO2 (%)     -- --       Physical Exam  Vitals and nursing note reviewed.   Constitutional:       General: He is not in acute distress.     Appearance: He is well-developed.   HENT:      Head: Normocephalic and atraumatic.   Eyes:      Conjunctiva/sclera: Conjunctivae normal.   Cardiovascular:      Rate and Rhythm: Normal rate and regular rhythm.      Heart sounds: No murmur heard.  Pulmonary:      Effort: Pulmonary effort is normal. No respiratory distress.      Breath sounds: Normal breath sounds.   Abdominal:      Palpations: Abdomen is soft.      Tenderness: There is no abdominal tenderness.   Musculoskeletal:         General: No swelling.      Cervical back: Neck supple.      Comments: Head is atraumatic, no hemotympanum, no septal hematoma.  No intraoral trauma.  No dental malocclusion.  No maxillary mandibular tenderness.  No cervical, thoracic tenderness  Mild tenderness to the paravertebral left lower lumbar spine.  No chest wall tenderness or signs of deformity.  Breath  sounds are equal bilateral.  No abdominal tenderness or signs of deformity.  Mild tenderness to the left wrist.  Right upper extremity bilateral lower extremities are atraumatic.       Skin:     General: Skin is warm and dry.      Capillary Refill: Capillary refill takes less than 2 seconds.   Neurological:      Mental Status: He is alert.   Psychiatric:         Mood and Affect: Mood normal.           ED Course & MDM   ED Course as of 02/11/25 1101   Mon Feb 10, 2025   1950 EKG shows sinus rhythm.  No STEMI.  Long VT interval.  First-degree AV block.  Normal axis. [RS]   2046 Spoke with Dr. Dejesus of neurosurgery at [facility].  He recommends obtaining a repeat scan in 6 hours.  If scan is unchanged and patient clinically has no other changes he can be cleared from his subdural hematoma and can follow-up outpatient. [RS]   2113 Spoke with Dr. Burns, of trauma surgery.  She is agreeable with plan for repeat CT imaging at 230 and if unchanged can be discharged. [RS]   Tue Feb 11, 2025   0342 IMPRESSION:  Stable exam.   [CF]      ED Course User Index  [CF] Holly Richmond MD  [RS] Bereket Mayo, DO         Diagnoses as of 02/11/25 1101   Fall, initial encounter   Concussion with loss of consciousness, initial encounter   Subdural hematoma (Multi)                 No data recorded     [place] Coma Scale Score: 15 (02/10/25 1936 : Sylwia Small RN)                           Medical Decision Making  HISTORIAN:  Patient, daughter    CHART REVIEW:  No pertinent findings    PT SUMMARY:  59-year-old male presents ED after a fall.  Vital signs stable.  Limited trauma activated.    DDX:  Intracranial bleed, cervical fracture, lumbar fracture, syncope, concussion    PLAN:  Will obtain a CBC, CMP, EKG, troponin, CT brain, C-spine and lumbar spine.  Also obtain x-ray of the chest and left wrist.    DISPO/RE-EVAL:  Labs show no acute change from patient's baseline.  CT imaging was significant for small subdural hematoma otherwise  workup was negative for acute traumatic pathology.  I spoke with neurosurgery and trauma surgery.  Both are agreeable to obtain a repeat CT brain in 6 hours to assess if bleed has worsened.  At this point time patient was signed out to oncoming provider pending CT imaging.            Procedure  Procedures     Bereket Mayo,   02/10/25 2132       Bereket Mayo,   02/11/25 [id number]

## 2025-02-11 NOTE — ED TRIAGE NOTES
Pt presents for a mechanical fall on the ice from home. + LOC. No thinners. Pt c/o back of head, left wrist and lower back pain. States he cannot recall events that happened and states that he is nauseated. Alert and oriented x's 4.

## 2025-02-11 NOTE — TELEPHONE ENCOUNTER
The pt fell yesterday and hit his head, he doesn't remember anything they think he may have slipped on the ice and hit his head, they took him to Replaced by [facility], the encounter is in his chart, they want the pt to follow up with his PCP, he has an appointment with you on 2/17, he is asking if you want to see him before that

## 2025-02-12 LAB
ATRIAL RATE: 91 BPM
P AXIS: 63 DEGREES
PR INTERVAL: 211 MS
Q ONSET: 251 MS
QRS COUNT: 15 BEATS
QRS DURATION: 97 MS
QT INTERVAL: 356 MS
QTC CALCULATION(BAZETT): 439 MS
QTC FREDERICIA: 409 MS
R AXIS: 64 DEGREES
T AXIS: 43 DEGREES
T OFFSET: 429 MS
VENTRICULAR RATE: 91 BPM

## 2025-02-17 ENCOUNTER — OFFICE VISIT (OUTPATIENT)
Dept: PRIMARY CARE CLINIC | Age: 60
End: 2025-02-17
Payer: COMMERCIAL

## 2025-02-17 VITALS
TEMPERATURE: 97.9 F | OXYGEN SATURATION: 97 % | BODY MASS INDEX: 41.75 KG/M2 | WEIGHT: 315 LBS | HEIGHT: 73 IN | HEART RATE: 88 BPM | DIASTOLIC BLOOD PRESSURE: 70 MMHG | SYSTOLIC BLOOD PRESSURE: 126 MMHG

## 2025-02-17 DIAGNOSIS — S00.83XD CONTUSION OF OTHER PART OF HEAD, SUBSEQUENT ENCOUNTER: ICD-10-CM

## 2025-02-17 DIAGNOSIS — S06.5XAA SUBDURAL HEMATOMA (HCC): Primary | ICD-10-CM

## 2025-02-17 DIAGNOSIS — E29.1 HYPOTESTOSTERONEMIA IN MALE: ICD-10-CM

## 2025-02-17 DIAGNOSIS — E11.9 TYPE 2 DIABETES MELLITUS WITHOUT COMPLICATION, WITHOUT LONG-TERM CURRENT USE OF INSULIN (HCC): Primary | ICD-10-CM

## 2025-02-17 DIAGNOSIS — R91.1 PULMONARY NODULE, RIGHT: ICD-10-CM

## 2025-02-17 DIAGNOSIS — S06.5XAA SUBDURAL HEMATOMA (HCC): ICD-10-CM

## 2025-02-17 PROBLEM — I62.00 SUBDURAL HEMORRHAGE (HCC): Status: ACTIVE | Noted: 2025-02-17

## 2025-02-17 PROBLEM — S00.93XA HEAD CONTUSION: Status: ACTIVE | Noted: 2025-02-17

## 2025-02-17 PROCEDURE — 3017F COLORECTAL CA SCREEN DOC REV: CPT | Performed by: FAMILY MEDICINE

## 2025-02-17 PROCEDURE — 2022F DILAT RTA XM EVC RTNOPTHY: CPT | Performed by: FAMILY MEDICINE

## 2025-02-17 PROCEDURE — G8417 CALC BMI ABV UP PARAM F/U: HCPCS | Performed by: FAMILY MEDICINE

## 2025-02-17 PROCEDURE — 3044F HG A1C LEVEL LT 7.0%: CPT | Performed by: FAMILY MEDICINE

## 2025-02-17 PROCEDURE — G8427 DOCREV CUR MEDS BY ELIG CLIN: HCPCS | Performed by: FAMILY MEDICINE

## 2025-02-17 PROCEDURE — 99214 OFFICE O/P EST MOD 30 MIN: CPT | Performed by: FAMILY MEDICINE

## 2025-02-17 PROCEDURE — 1036F TOBACCO NON-USER: CPT | Performed by: FAMILY MEDICINE

## 2025-02-17 NOTE — PROGRESS NOTES
25     Terrance Bell    : 1965 Sex: male   Age: 59 y.o.      Chief Complaint   Patient presents with    Diabetes    Follow-Up from Hospital       Prior to Admission medications    Medication Sig Start Date End Date Taking? Authorizing Provider   Semaglutide, 1 MG/DOSE, (OZEMPIC) 4 MG/3ML SOPN sc injection Inject 1 mg into the skin every 7 days 25   Jaziel Zapata DO   gabapentin (NEURONTIN) 100 MG capsule Take 1 capsule by mouth nightly for 120 days. Intended supply: 30 days 11/25/24 3/25/25  Jaziel Zapata DO   lisinopril (PRINIVIL;ZESTRIL) 40 MG tablet 1 qd 24   Jaziel Zapata DO   azelastine HCl (ASTEPRO) 0.15 % SOLN by Nasal route    ProviderRichard MD   ASPIRIN 81 PO Take by mouth daily    ProviderRichard MD   ciclopirox (LOPROX) 0.77 % cream Apply topically 2 times daily Apply topically 2 times daily.    Provider, MD Richard   montelukast (SINGULAIR) 10 MG tablet Take 1 tablet by mouth nightly    Provider, MD Richard          HPI: Patient evaluated today in medical follow-up from the emergency room he was seen on 10 February for an accidental fall on ice and contusion to the head.  Evaluated through the emergency room and subdural hematoma was noted.  Patient was observed overnight for 24 hours and then discharged home on the .  He has remained in satisfactory condition neurologically.  Neurosurgery follow-up is recommended and yet to occur and I did make a referral today with Dr. Rhodes.          Review of Systems   Constitutional: Negative.    HENT: Negative.     Eyes: Negative.    Respiratory: Negative.     Gastrointestinal: Negative.    Endocrine: Negative.    Genitourinary: Negative.    Musculoskeletal: Negative.    Skin: Negative.    Allergic/Immunologic: Negative.    Neurological: Negative.    Hematological: Negative.    Psychiatric/Behavioral: Negative.     Today systems review is overall stable neurologically he is intact.          Current

## 2025-02-24 ENCOUNTER — OFFICE VISIT (OUTPATIENT)
Dept: PRIMARY CARE CLINIC | Age: 60
End: 2025-02-24
Payer: COMMERCIAL

## 2025-02-24 VITALS
OXYGEN SATURATION: 98 % | HEART RATE: 82 BPM | BODY MASS INDEX: 41.75 KG/M2 | SYSTOLIC BLOOD PRESSURE: 118 MMHG | TEMPERATURE: 98 F | DIASTOLIC BLOOD PRESSURE: 68 MMHG | WEIGHT: 315 LBS | HEIGHT: 73 IN

## 2025-02-24 DIAGNOSIS — S06.5XAA SUBDURAL HEMATOMA (HCC): ICD-10-CM

## 2025-02-24 DIAGNOSIS — I15.9 SECONDARY HYPERTENSION: ICD-10-CM

## 2025-02-24 DIAGNOSIS — J34.89 NASAL DRAINAGE: ICD-10-CM

## 2025-02-24 DIAGNOSIS — E11.9 TYPE 2 DIABETES MELLITUS WITHOUT COMPLICATION, WITHOUT LONG-TERM CURRENT USE OF INSULIN (HCC): Primary | ICD-10-CM

## 2025-02-24 PROCEDURE — G8417 CALC BMI ABV UP PARAM F/U: HCPCS | Performed by: FAMILY MEDICINE

## 2025-02-24 PROCEDURE — 3017F COLORECTAL CA SCREEN DOC REV: CPT | Performed by: FAMILY MEDICINE

## 2025-02-24 PROCEDURE — 2022F DILAT RTA XM EVC RTNOPTHY: CPT | Performed by: FAMILY MEDICINE

## 2025-02-24 PROCEDURE — 3044F HG A1C LEVEL LT 7.0%: CPT | Performed by: FAMILY MEDICINE

## 2025-02-24 PROCEDURE — 1036F TOBACCO NON-USER: CPT | Performed by: FAMILY MEDICINE

## 2025-02-24 PROCEDURE — 99214 OFFICE O/P EST MOD 30 MIN: CPT | Performed by: FAMILY MEDICINE

## 2025-02-24 PROCEDURE — G8427 DOCREV CUR MEDS BY ELIG CLIN: HCPCS | Performed by: FAMILY MEDICINE

## 2025-02-24 ASSESSMENT — ENCOUNTER SYMPTOMS
RESPIRATORY NEGATIVE: 1
ALLERGIC/IMMUNOLOGIC NEGATIVE: 1
EYES NEGATIVE: 1
SINUS PAIN: 1
GASTROINTESTINAL NEGATIVE: 1
SINUS PRESSURE: 1

## 2025-02-24 NOTE — PROGRESS NOTES
25     Terrance Bell    : 1965 Sex: male   Age: 59 y.o.      Chief Complaint   Patient presents with    Balance issue     Pt states that he has been having problems with his balanced and having headaches right above his eyes. Pt states that he is still has issues with bright lights or changing in lighting.    Sinusitis       Prior to Admission medications    Medication Sig Start Date End Date Taking? Authorizing Provider   Semaglutide, 1 MG/DOSE, (OZEMPIC) 4 MG/3ML SOPN sc injection Inject 1 mg into the skin every 7 days 25  Yes Jaziel Zapata DO   gabapentin (NEURONTIN) 100 MG capsule Take 1 capsule by mouth nightly for 120 days. Intended supply: 30 days 11/25/24 3/25/25 Yes Jaziel Zapata DO   lisinopril (PRINIVIL;ZESTRIL) 40 MG tablet 1 qd 24  Yes Jaziel Zapata DO   azelastine HCl (ASTEPRO) 0.15 % SOLN by Nasal route   Yes Richard Abraham MD   ASPIRIN 81 PO Take by mouth daily   Yes Richard Abraham MD   ciclopirox (LOPROX) 0.77 % cream Apply topically 2 times daily Apply topically 2 times daily.   Yes ProviderRichard MD   montelukast (SINGULAIR) 10 MG tablet Take 1 tablet by mouth nightly   Yes ProviderRichard MD          HPI: Patient evaluated today type 2 diabetes hypertensive disease today with some nasal drainage frontal sinus pressure.  Recommending use of over-the-counter saline.  Has been clear in color do not feel need for antibiotics.  Recent accidental fall head injury subdural hematoma and neurologically he is stable.  Plans are for CT scan of the head on  and then neurosurgery follow-up.  Patient understands if difficulties with balance confusion immediate follow-up.          Review of Systems   Constitutional: Negative.    HENT:  Positive for congestion, sinus pressure and sinus pain.    Eyes: Negative.    Respiratory: Negative.     Gastrointestinal: Negative.    Endocrine: Negative.    Genitourinary: Negative.    Musculoskeletal:

## 2025-03-19 ENCOUNTER — OFFICE VISIT (OUTPATIENT)
Dept: NEUROSURGERY | Age: 60
End: 2025-03-19
Payer: COMMERCIAL

## 2025-03-19 ENCOUNTER — HOSPITAL ENCOUNTER (OUTPATIENT)
Dept: CT IMAGING | Age: 60
Discharge: HOME | End: 2025-03-21
Payer: COMMERCIAL

## 2025-03-19 VITALS — HEIGHT: 73 IN | RESPIRATION RATE: 18 BRPM | BODY MASS INDEX: 41.75 KG/M2 | WEIGHT: 315 LBS

## 2025-03-19 DIAGNOSIS — S06.5XAA SUBDURAL HEMATOMA (HCC): ICD-10-CM

## 2025-03-19 DIAGNOSIS — S06.5XAA SUBDURAL HEMATOMA (HCC): Primary | ICD-10-CM

## 2025-03-19 PROCEDURE — 99203 OFFICE O/P NEW LOW 30 MIN: CPT | Performed by: PHYSICIAN ASSISTANT

## 2025-03-19 PROCEDURE — 3017F COLORECTAL CA SCREEN DOC REV: CPT | Performed by: PHYSICIAN ASSISTANT

## 2025-03-19 PROCEDURE — 1036F TOBACCO NON-USER: CPT | Performed by: PHYSICIAN ASSISTANT

## 2025-03-19 PROCEDURE — G8417 CALC BMI ABV UP PARAM F/U: HCPCS | Performed by: PHYSICIAN ASSISTANT

## 2025-03-19 PROCEDURE — G8427 DOCREV CUR MEDS BY ELIG CLIN: HCPCS | Performed by: PHYSICIAN ASSISTANT

## 2025-03-19 PROCEDURE — 70450 CT HEAD/BRAIN W/O DYE: CPT

## 2025-03-19 ASSESSMENT — ENCOUNTER SYMPTOMS
BLURRED VISION: 1
GASTROINTESTINAL NEGATIVE: 1
ALLERGIC/IMMUNOLOGIC NEGATIVE: 1
RESPIRATORY NEGATIVE: 1

## 2025-03-19 NOTE — PROGRESS NOTES
Subjective   Patient ID: Terrance Bell is a 59 y.o. male.    Head Injury   Incident onset: Fell one month ago and struck head with + LOC.  he went to outside facility and found to have SDH on head CT. The patient is experiencing no pain. Associated symptoms include blurred vision and headaches. Pertinent negatives include no disorientation, memory loss or numbness.       Review of Systems   Constitutional: Negative.    HENT: Negative.     Eyes:  Positive for blurred vision.   Respiratory: Negative.     Cardiovascular: Negative.    Gastrointestinal: Negative.    Endocrine: Negative.    Genitourinary: Negative.    Skin: Negative.    Allergic/Immunologic: Negative.    Neurological:  Positive for headaches. Negative for numbness.   Hematological: Negative.    Psychiatric/Behavioral: Negative.  Negative for memory loss.           Objective   Physical Exam  Constitutional:       Appearance: Normal appearance.   HENT:      Head: Normocephalic and atraumatic.      Nose: Nose normal.   Eyes:      Pupils: Pupils are equal, round, and reactive to light.   Pulmonary:      Effort: Pulmonary effort is normal.   Abdominal:      General: There is no distension.   Skin:     General: Skin is warm and dry.   Neurological:      Mental Status: He is alert.      GCS: GCS eye subscore is 4. GCS verbal subscore is 5. GCS motor subscore is 6.      Cranial Nerves: Cranial nerves 2-12 are intact.      Sensory: Sensation is intact.      Motor: Motor function is intact.      Gait: Gait is intact.   Psychiatric:         Mood and Affect: Mood normal.            Assessment   59 year old male with interhemispheric SDH along falx.  Head Ct shows resolution of hemorrhage.  Stable exam.      Plan   No restrictions.  Ok for AC or AP medications if needed.  F/u PRN        DONATO Cruz

## 2025-03-24 ENCOUNTER — OFFICE VISIT (OUTPATIENT)
Dept: PRIMARY CARE CLINIC | Age: 60
End: 2025-03-24
Payer: COMMERCIAL

## 2025-03-24 VITALS
DIASTOLIC BLOOD PRESSURE: 70 MMHG | HEIGHT: 73 IN | TEMPERATURE: 98.6 F | HEART RATE: 84 BPM | SYSTOLIC BLOOD PRESSURE: 120 MMHG | OXYGEN SATURATION: 97 % | BODY MASS INDEX: 41.75 KG/M2 | WEIGHT: 315 LBS

## 2025-03-24 DIAGNOSIS — E11.9 TYPE 2 DIABETES MELLITUS WITHOUT COMPLICATION, WITHOUT LONG-TERM CURRENT USE OF INSULIN: ICD-10-CM

## 2025-03-24 DIAGNOSIS — I15.9 SECONDARY HYPERTENSION: ICD-10-CM

## 2025-03-24 DIAGNOSIS — S06.5XAA SUBDURAL HEMATOMA (HCC): Primary | ICD-10-CM

## 2025-03-24 DIAGNOSIS — E66.01 MORBID OBESITY: ICD-10-CM

## 2025-03-24 DIAGNOSIS — H49.21 RIGHT ABDUCENS NERVE PALSY: ICD-10-CM

## 2025-03-24 PROCEDURE — 1036F TOBACCO NON-USER: CPT | Performed by: FAMILY MEDICINE

## 2025-03-24 PROCEDURE — 3044F HG A1C LEVEL LT 7.0%: CPT | Performed by: FAMILY MEDICINE

## 2025-03-24 PROCEDURE — G8417 CALC BMI ABV UP PARAM F/U: HCPCS | Performed by: FAMILY MEDICINE

## 2025-03-24 PROCEDURE — G8427 DOCREV CUR MEDS BY ELIG CLIN: HCPCS | Performed by: FAMILY MEDICINE

## 2025-03-24 PROCEDURE — 99214 OFFICE O/P EST MOD 30 MIN: CPT | Performed by: FAMILY MEDICINE

## 2025-03-24 PROCEDURE — 2022F DILAT RTA XM EVC RTNOPTHY: CPT | Performed by: FAMILY MEDICINE

## 2025-03-24 PROCEDURE — 3017F COLORECTAL CA SCREEN DOC REV: CPT | Performed by: FAMILY MEDICINE

## 2025-03-24 RX ORDER — GABAPENTIN 100 MG/1
100 CAPSULE ORAL NIGHTLY
Qty: 30 CAPSULE | Refills: 3 | Status: SHIPPED | OUTPATIENT
Start: 2025-03-24 | End: 2025-07-22

## 2025-03-24 RX ORDER — LISINOPRIL 40 MG/1
TABLET ORAL
Qty: 90 TABLET | Refills: 3 | Status: SHIPPED | OUTPATIENT
Start: 2025-03-24

## 2025-03-24 ASSESSMENT — ENCOUNTER SYMPTOMS
EYES NEGATIVE: 1
GASTROINTESTINAL NEGATIVE: 1
RESPIRATORY NEGATIVE: 1
ALLERGIC/IMMUNOLOGIC NEGATIVE: 1

## 2025-03-24 NOTE — PROGRESS NOTES
3/24/25     Terrance Bell    : 1965 Sex: male   Age: 59 y.o.      Chief Complaint   Patient presents with    Diabetes    Follow-up       Prior to Admission medications    Medication Sig Start Date End Date Taking? Authorizing Provider   gabapentin (NEURONTIN) 100 MG capsule Take 1 capsule by mouth nightly for 120 days. Intended supply: 30 days 3/24/25 7/22/25 Yes Jaziel Zapata DO   lisinopril (PRINIVIL;ZESTRIL) 40 MG tablet 1 qd 3/24/25  Yes Jaziel Zapata DO   Semaglutide, 1 MG/DOSE, (OZEMPIC) 4 MG/3ML SOPN sc injection Inject 1 mg into the skin every 7 days 3/24/25  Yes Jaziel Zapata DO   azelastine HCl (ASTEPRO) 0.15 % SOLN by Nasal route    ProviderRichard MD   ASPIRIN 81 PO Take by mouth daily    ProviderRichard MD   ciclopirox (LOPROX) 0.77 % cream Apply topically 2 times daily Apply topically 2 times daily.    ProviderRichard MD   montelukast (SINGULAIR) 10 MG tablet Take 1 tablet by mouth nightly    ProviderRichard MD          HPI: Patient seen today medical follow-up on hypertension diabetes obesity history of subdural hematoma in VI nerve palsy involving the right eye.  Eye palsy resulted after his fall and subdural hematoma.  That is currently improving.  He was evaluated by ophthalmology as well as by neurosurgery and hematoma has essentially resolved.  Hypertension diabetes controlled.  Medications well-tolerated continue as prescribed.  Continues to do well with weight loss.  Ozempic will be maintained at 1 mg weekly.          Review of Systems   Constitutional: Negative.    HENT: Negative.     Eyes: Negative.    Respiratory: Negative.     Gastrointestinal: Negative.    Endocrine: Negative.    Genitourinary: Negative.    Musculoskeletal: Negative.    Skin: Negative.    Allergic/Immunologic: Negative.    Neurological: Negative.    Hematological: Negative.    Psychiatric/Behavioral: Negative.        Today systems review stable meds as

## 2025-03-31 ENCOUNTER — OFFICE VISIT (OUTPATIENT)
Dept: PRIMARY CARE CLINIC | Age: 60
End: 2025-03-31
Payer: COMMERCIAL

## 2025-03-31 VITALS
SYSTOLIC BLOOD PRESSURE: 108 MMHG | DIASTOLIC BLOOD PRESSURE: 70 MMHG | HEART RATE: 83 BPM | OXYGEN SATURATION: 98 % | HEIGHT: 73 IN | WEIGHT: 315 LBS | TEMPERATURE: 98 F | BODY MASS INDEX: 41.75 KG/M2

## 2025-03-31 DIAGNOSIS — G62.9 POLYNEUROPATHY: ICD-10-CM

## 2025-03-31 DIAGNOSIS — I15.9 SECONDARY HYPERTENSION: ICD-10-CM

## 2025-03-31 DIAGNOSIS — R42 VERTIGO: ICD-10-CM

## 2025-03-31 DIAGNOSIS — E11.9 TYPE 2 DIABETES MELLITUS WITHOUT COMPLICATION, WITHOUT LONG-TERM CURRENT USE OF INSULIN: Primary | ICD-10-CM

## 2025-03-31 PROCEDURE — G8427 DOCREV CUR MEDS BY ELIG CLIN: HCPCS | Performed by: FAMILY MEDICINE

## 2025-03-31 PROCEDURE — 99214 OFFICE O/P EST MOD 30 MIN: CPT | Performed by: FAMILY MEDICINE

## 2025-03-31 PROCEDURE — 2022F DILAT RTA XM EVC RTNOPTHY: CPT | Performed by: FAMILY MEDICINE

## 2025-03-31 PROCEDURE — 3017F COLORECTAL CA SCREEN DOC REV: CPT | Performed by: FAMILY MEDICINE

## 2025-03-31 PROCEDURE — 1036F TOBACCO NON-USER: CPT | Performed by: FAMILY MEDICINE

## 2025-03-31 PROCEDURE — G8417 CALC BMI ABV UP PARAM F/U: HCPCS | Performed by: FAMILY MEDICINE

## 2025-03-31 PROCEDURE — 3044F HG A1C LEVEL LT 7.0%: CPT | Performed by: FAMILY MEDICINE

## 2025-03-31 RX ORDER — GABAPENTIN 100 MG/1
100 CAPSULE ORAL NIGHTLY
Qty: 30 CAPSULE | Refills: 3 | Status: SHIPPED | OUTPATIENT
Start: 2025-03-31 | End: 2025-07-29

## 2025-03-31 RX ORDER — MECLIZINE HYDROCHLORIDE 25 MG/1
25 TABLET ORAL 3 TIMES DAILY PRN
Qty: 30 TABLET | Refills: 0 | Status: SHIPPED | OUTPATIENT
Start: 2025-03-31 | End: 2025-04-10

## 2025-03-31 ASSESSMENT — ENCOUNTER SYMPTOMS
ALLERGIC/IMMUNOLOGIC NEGATIVE: 1
RESPIRATORY NEGATIVE: 1
EYES NEGATIVE: 1
GASTROINTESTINAL NEGATIVE: 1

## 2025-03-31 NOTE — PROGRESS NOTES
3/31/25     Terrance Bell    : 1965 Sex: male   Age: 59 y.o.      Chief Complaint   Patient presents with    Dizziness       Prior to Admission medications    Medication Sig Start Date End Date Taking? Authorizing Provider   meclizine (ANTIVERT) 25 MG tablet Take 1 tablet by mouth 3 times daily as needed for Dizziness 3/31/25 4/10/25 Yes Jaziel Zapata DO   gabapentin (NEURONTIN) 100 MG capsule Take 1 capsule by mouth nightly for 120 days. Intended supply: 30 days 3/31/25 7/29/25 Yes Jaziel Zapata DO   lisinopril (PRINIVIL;ZESTRIL) 40 MG tablet 1 qd 3/24/25  Yes Jaziel Zapata DO   Semaglutide, 1 MG/DOSE, (OZEMPIC) 4 MG/3ML SOPN sc injection Inject 1 mg into the skin every 7 days 3/24/25  Yes Jaziel Zapata DO   azelastine HCl (ASTEPRO) 0.15 % SOLN by Nasal route   Yes ProviderRichard MD   ASPIRIN 81 PO Take by mouth daily   Yes Richard Abraham MD   ciclopirox (LOPROX) 0.77 % cream Apply topically 2 times daily Apply topically 2 times daily.   Yes ProviderRichard MD   montelukast (SINGULAIR) 10 MG tablet Take 1 tablet by mouth nightly   Yes ProviderRichard MD          HPI: Patient with diabetes hypertension polyneuropathy and vertigo.  Today new onset mild vertiginous complaints may be related to slight labyrinthitis.  Reproduced in a lying position with head rotation.  Recommending some meclizine and will be seeing ENT tomorrow.  Hypertension currently controlled.  Diabetes controlled.  Recent history of head injury has stabilized and doing well.          Review of Systems   Constitutional: Negative.    HENT: Negative.     Eyes: Negative.    Respiratory: Negative.     Gastrointestinal: Negative.    Endocrine: Negative.    Genitourinary: Negative.    Musculoskeletal: Negative.    Skin: Negative.    Allergic/Immunologic: Negative.    Neurological:  Positive for dizziness.   Hematological: Negative.    Psychiatric/Behavioral: Negative.     Today systems review stable

## 2025-04-01 ENCOUNTER — OFFICE VISIT (OUTPATIENT)
Dept: ENT CLINIC | Age: 60
End: 2025-04-01
Payer: COMMERCIAL

## 2025-04-01 ENCOUNTER — PROCEDURE VISIT (OUTPATIENT)
Dept: AUDIOLOGY | Age: 60
End: 2025-04-01
Payer: COMMERCIAL

## 2025-04-01 VITALS
SYSTOLIC BLOOD PRESSURE: 146 MMHG | TEMPERATURE: 98.4 F | DIASTOLIC BLOOD PRESSURE: 78 MMHG | RESPIRATION RATE: 20 BRPM | HEIGHT: 73 IN | WEIGHT: 315 LBS | HEART RATE: 91 BPM | BODY MASS INDEX: 41.75 KG/M2 | OXYGEN SATURATION: 97 %

## 2025-04-01 DIAGNOSIS — H90.3 SENSORINEURAL HEARING LOSS (SNHL) OF BOTH EARS: ICD-10-CM

## 2025-04-01 DIAGNOSIS — Z98.890 S/P FESS (FUNCTIONAL ENDOSCOPIC SINUS SURGERY): ICD-10-CM

## 2025-04-01 DIAGNOSIS — H90.3 ASYMMETRIC SNHL (SENSORINEURAL HEARING LOSS): Primary | ICD-10-CM

## 2025-04-01 DIAGNOSIS — R42 DIZZINESS: ICD-10-CM

## 2025-04-01 DIAGNOSIS — J30.1 SEASONAL ALLERGIC RHINITIS DUE TO POLLEN: Primary | ICD-10-CM

## 2025-04-01 PROCEDURE — 99213 OFFICE O/P EST LOW 20 MIN: CPT | Performed by: OTOLARYNGOLOGY

## 2025-04-01 PROCEDURE — 92557 COMPREHENSIVE HEARING TEST: CPT

## 2025-04-01 PROCEDURE — 3017F COLORECTAL CA SCREEN DOC REV: CPT | Performed by: OTOLARYNGOLOGY

## 2025-04-01 PROCEDURE — 92567 TYMPANOMETRY: CPT

## 2025-04-01 PROCEDURE — G8427 DOCREV CUR MEDS BY ELIG CLIN: HCPCS | Performed by: OTOLARYNGOLOGY

## 2025-04-01 PROCEDURE — 1036F TOBACCO NON-USER: CPT | Performed by: OTOLARYNGOLOGY

## 2025-04-01 PROCEDURE — G8417 CALC BMI ABV UP PARAM F/U: HCPCS | Performed by: OTOLARYNGOLOGY

## 2025-04-01 RX ORDER — IPRATROPIUM BROMIDE 42 UG/1
2 SPRAY, METERED NASAL 4 TIMES DAILY
Qty: 1 EACH | Refills: 3 | Status: SHIPPED | OUTPATIENT
Start: 2025-04-01

## 2025-04-01 ASSESSMENT — ENCOUNTER SYMPTOMS
RESPIRATORY NEGATIVE: 1
SINUS PRESSURE: 0
SINUS PAIN: 0
ABDOMINAL PAIN: 0
RHINORRHEA: 0
GASTROINTESTINAL NEGATIVE: 1
COLOR CHANGE: 0
SHORTNESS OF BREATH: 0
EYES NEGATIVE: 1

## 2025-04-01 NOTE — PROGRESS NOTES
This patient was referred for audiometric and tympanometric testing by Dr. Jacobs due to dizziness.     Audiometry using pure tone air and bone conduction testing revealed hearing sensitivity within normal limits through 2000 Hz sloping to a mild  sensorineural hearing loss in the right ear and hearing sensitivity within normal limits through 2000 Hz sloping to a severe sensorineural hearing loss beyond in the left ear. An asymmetry is noted between the ears from 1109-2902 Hz with the left ear being worse than the right. Reliability was good. Speech reception thresholds were in good agreement with the pure tone averages, bilaterally. Speech discrimination scores were excellent, bilaterally.    Tympanometry revealed normal middle ear peak pressure and compliance, bilaterally.    The results were reviewed with the patient and ordering provider.     Recommendations for follow up will be made pending physician consult.      Selina Masters, CCC-A  Clinical Audiologist  [place] License: [id number]    Electronically signed by Emanuel Rogel on 4/1/2025 at 9:07 AM

## 2025-04-01 NOTE — PROGRESS NOTES
Subjective:      Patient ID:  Terrance Bell is a 59 y.o. male.    HPI:  Returns for recheck of allergies. Has seen an allergist in the past not found to have allergies. Thursday started having dry nose, congestion, worsening dizziness. One sinus infection since previous encounter resolved with flush no antibtioic. Not using astelin. Describes dizziness as feeling off balance and occurs with positional changes/ head movement. Denied increased hearing loss or tinnitus during episode. Singulair daily.  Since previous encounter had fall with subdural hematoma blacked out no memory of it however think fell on ice. Hx loud noise exposure occuptational    Infectious disease ? Yes did see said dnt need to see him unless need picc line    History of   FESS, septo, and SMRITS  When? November year: 2023    Nasal Steroid: no   Name: no   Still taking: no       Other therapy:   Astelin- yes  oral antihistamine- none  leukotriene inhibitor- none  oral decongestant- none      Patient's medications, allergies, past medical, surgical, social and family histories were reviewed and updated as appropriate.        Review of Systems   Constitutional: Negative.  Negative for fever.   HENT:  Positive for congestion. Negative for postnasal drip, rhinorrhea, sinus pressure, sinus pain and sneezing.    Eyes: Negative.  Negative for visual disturbance.   Respiratory: Negative.  Negative for shortness of breath.    Cardiovascular: Negative.  Negative for chest pain.   Gastrointestinal: Negative.  Negative for abdominal pain.   Genitourinary: Negative.    Musculoskeletal: Negative.    Skin: Negative.  Negative for color change.   Allergic/Immunologic: Positive for environmental allergies.   Neurological: Negative.    Psychiatric/Behavioral: Negative.  Negative for behavioral problems and hallucinations.    All other systems reviewed and are negative.              Objective:     Vitals:    04/01/25 0652   BP: (!) 146/78   Pulse: 91   Resp: 20

## 2025-04-08 ENCOUNTER — OFFICE VISIT (OUTPATIENT)
Dept: FAMILY MEDICINE CLINIC | Age: 60
End: 2025-04-08
Payer: COMMERCIAL

## 2025-04-08 VITALS
OXYGEN SATURATION: 98 % | HEIGHT: 73 IN | DIASTOLIC BLOOD PRESSURE: 78 MMHG | RESPIRATION RATE: 18 BRPM | HEART RATE: 81 BPM | SYSTOLIC BLOOD PRESSURE: 128 MMHG | WEIGHT: 315 LBS | TEMPERATURE: 97.1 F | BODY MASS INDEX: 41.75 KG/M2

## 2025-04-08 DIAGNOSIS — J01.90 ACUTE NON-RECURRENT SINUSITIS, UNSPECIFIED LOCATION: Primary | ICD-10-CM

## 2025-04-08 PROCEDURE — G8427 DOCREV CUR MEDS BY ELIG CLIN: HCPCS | Performed by: PHYSICIAN ASSISTANT

## 2025-04-08 PROCEDURE — G8417 CALC BMI ABV UP PARAM F/U: HCPCS | Performed by: PHYSICIAN ASSISTANT

## 2025-04-08 PROCEDURE — 1036F TOBACCO NON-USER: CPT | Performed by: PHYSICIAN ASSISTANT

## 2025-04-08 PROCEDURE — 99213 OFFICE O/P EST LOW 20 MIN: CPT | Performed by: PHYSICIAN ASSISTANT

## 2025-04-08 PROCEDURE — 3017F COLORECTAL CA SCREEN DOC REV: CPT | Performed by: PHYSICIAN ASSISTANT

## 2025-04-08 RX ORDER — CEFDINIR 300 MG/1
300 CAPSULE ORAL 2 TIMES DAILY
Qty: 20 CAPSULE | Refills: 0 | Status: SHIPPED | OUTPATIENT
Start: 2025-04-08 | End: 2025-04-18

## 2025-04-08 RX ORDER — PREDNISONE 10 MG/1
TABLET ORAL
Qty: 18 TABLET | Refills: 0 | Status: SHIPPED | OUTPATIENT
Start: 2025-04-08

## 2025-04-08 NOTE — PROGRESS NOTES
25  Terrance Bell : 1965 Sex: male  Age 59 y.o.      Subjective:  Chief Complaint   Patient presents with    Sinusitis     Started 8 days ago    Sore Throat         HPI:     History of Present Illness  The patient is a 59-year-old male who presents for evaluation of sinus congestion and sore throat.    Persistent sinus congestion and postnasal drainage are reported, attributed to a sinus infection. No systemic symptoms such as fever or chills are noted. The condition has been managed for approximately 8 days without antibiotics or steroids, relying solely on regular medications. Previous advice from Dr. Figueroa included managing the sinus infection with saline and prednisone, but prednisone has not been received. A new nasal spray was recently added to the treatment regimen.    A concurrent sore throat is reported, believed to be secondary to the sinus infection. Concern is expressed about the worsening throat symptoms, and an antibiotic prescription is requested as a precautionary measure.            ROS:   Unless otherwise stated in this report the patient's positive and negative responses for review of systems for constitutional, eyes, ENT, cardiovascular, respiratory, gastrointestinal, neurological, , musculoskeletal, and integument systems and related systems to the presenting problem are either stated in the history of present illness or were not pertinent or were negative for the symptoms and/or complaints related to the presenting medical problem.  Positives and pertinent negatives as per HPI.  All others reviewed and are negative.      PMH:     Past Medical History:   Diagnosis Date    Arthritis     Asthma     Deviated nasal septum     Diabetes mellitus (HCC)     Hypertension     Prolonged emergence from general anesthesia        Past Surgical History:   Procedure Laterality Date    APPENDECTOMY      COLONOSCOPY N/A 04/15/2021    COLONOSCOPY WITH BIOPSY performed by Bird Brown DO at

## 2025-04-21 DIAGNOSIS — E11.9 TYPE 2 DIABETES MELLITUS WITHOUT COMPLICATION, WITHOUT LONG-TERM CURRENT USE OF INSULIN (HCC): ICD-10-CM

## 2025-04-21 DIAGNOSIS — E66.01 MORBID OBESITY (HCC): ICD-10-CM

## 2025-04-21 DIAGNOSIS — I15.9 SECONDARY HYPERTENSION: ICD-10-CM

## 2025-04-21 LAB
ALBUMIN: 4 G/DL (ref 3.5–5.2)
ALP BLD-CCNC: 87 U/L (ref 40–129)
ALT SERPL-CCNC: 9 U/L (ref 0–50)
ANION GAP SERPL CALCULATED.3IONS-SCNC: 12 MMOL/L (ref 7–16)
AST SERPL-CCNC: 17 U/L (ref 0–50)
BASOPHILS ABSOLUTE: 0.06 K/UL (ref 0–0.2)
BASOPHILS RELATIVE PERCENT: 1 % (ref 0–2)
BILIRUB SERPL-MCNC: 0.4 MG/DL (ref 0–1.2)
BUN BLDV-MCNC: 18 MG/DL (ref 6–20)
CALCIUM SERPL-MCNC: 9.2 MG/DL (ref 8.6–10)
CHLORIDE BLD-SCNC: 104 MMOL/L (ref 98–107)
CHOLESTEROL, TOTAL: 152 MG/DL
CO2: 23 MMOL/L (ref 22–29)
CREAT SERPL-MCNC: 1 MG/DL (ref 0.7–1.2)
EOSINOPHILS ABSOLUTE: 0.34 K/UL (ref 0.05–0.5)
EOSINOPHILS RELATIVE PERCENT: 4 % (ref 0–6)
GFR, ESTIMATED: 85 ML/MIN/1.73M2
GLUCOSE BLD-MCNC: 105 MG/DL (ref 74–99)
HBA1C MFR BLD: 5.6 % (ref 4–5.6)
HCT VFR BLD CALC: 41.1 % (ref 37–54)
HDLC SERPL-MCNC: 29 MG/DL
HEMOGLOBIN: 12.9 G/DL (ref 12.5–16.5)
IMMATURE GRANULOCYTES %: 1 % (ref 0–5)
IMMATURE GRANULOCYTES ABSOLUTE: 0.05 K/UL (ref 0–0.58)
LDL CHOLESTEROL: 95 MG/DL
LYMPHOCYTES ABSOLUTE: 2.01 K/UL (ref 1.5–4)
LYMPHOCYTES RELATIVE PERCENT: 22 % (ref 20–42)
MCH RBC QN AUTO: 28.7 PG (ref 26–35)
MCHC RBC AUTO-ENTMCNC: 31.4 G/DL (ref 32–34.5)
MCV RBC AUTO: 91.3 FL (ref 80–99.9)
MONOCYTES ABSOLUTE: 0.63 K/UL (ref 0.1–0.95)
MONOCYTES RELATIVE PERCENT: 7 % (ref 2–12)
NEUTROPHILS ABSOLUTE: 6.28 K/UL (ref 1.8–7.3)
NEUTROPHILS RELATIVE PERCENT: 67 % (ref 43–80)
PDW BLD-RTO: 15 % (ref 11.5–15)
PLATELET # BLD: 268 K/UL (ref 130–450)
PMV BLD AUTO: 10.2 FL (ref 7–12)
POTASSIUM SERPL-SCNC: 3.9 MMOL/L (ref 3.5–5.1)
RBC # BLD: 4.5 M/UL (ref 3.8–5.8)
SODIUM BLD-SCNC: 139 MMOL/L (ref 136–145)
THYROXINE (T4): 6.4 UG/DL (ref 4.5–11.7)
TOTAL PROTEIN: 6.8 G/DL (ref 6.4–8.3)
TRIGL SERPL-MCNC: 141 MG/DL
TSH SERPL DL<=0.05 MIU/L-ACNC: 2.02 UIU/ML (ref 0.27–4.2)
VLDLC SERPL CALC-MCNC: 28 MG/DL
WBC # BLD: 9.4 K/UL (ref 4.5–11.5)

## 2025-04-28 ENCOUNTER — OFFICE VISIT (OUTPATIENT)
Dept: PRIMARY CARE CLINIC | Age: 60
End: 2025-04-28
Payer: COMMERCIAL

## 2025-04-28 VITALS
HEART RATE: 90 BPM | BODY MASS INDEX: 41.75 KG/M2 | OXYGEN SATURATION: 97 % | WEIGHT: 315 LBS | HEIGHT: 73 IN | DIASTOLIC BLOOD PRESSURE: 78 MMHG | SYSTOLIC BLOOD PRESSURE: 118 MMHG | TEMPERATURE: 97.4 F

## 2025-04-28 DIAGNOSIS — I15.9 SECONDARY HYPERTENSION: ICD-10-CM

## 2025-04-28 DIAGNOSIS — E11.9 TYPE 2 DIABETES MELLITUS WITHOUT COMPLICATION, WITHOUT LONG-TERM CURRENT USE OF INSULIN (HCC): Primary | ICD-10-CM

## 2025-04-28 DIAGNOSIS — S06.5XAA SUBDURAL HEMATOMA (HCC): ICD-10-CM

## 2025-04-28 DIAGNOSIS — E29.1 HYPOTESTOSTERONEMIA IN MALE: ICD-10-CM

## 2025-04-28 DIAGNOSIS — E66.01 MORBID OBESITY (HCC): ICD-10-CM

## 2025-04-28 DIAGNOSIS — G72.0 STATIN MYOPATHY: ICD-10-CM

## 2025-04-28 DIAGNOSIS — T46.6X5A STATIN MYOPATHY: ICD-10-CM

## 2025-04-28 DIAGNOSIS — R91.1 PULMONARY NODULE, RIGHT: ICD-10-CM

## 2025-04-28 PROCEDURE — 3017F COLORECTAL CA SCREEN DOC REV: CPT | Performed by: FAMILY MEDICINE

## 2025-04-28 PROCEDURE — 99214 OFFICE O/P EST MOD 30 MIN: CPT | Performed by: FAMILY MEDICINE

## 2025-04-28 PROCEDURE — 1036F TOBACCO NON-USER: CPT | Performed by: FAMILY MEDICINE

## 2025-04-28 PROCEDURE — G8417 CALC BMI ABV UP PARAM F/U: HCPCS | Performed by: FAMILY MEDICINE

## 2025-04-28 PROCEDURE — G8427 DOCREV CUR MEDS BY ELIG CLIN: HCPCS | Performed by: FAMILY MEDICINE

## 2025-04-28 PROCEDURE — 2022F DILAT RTA XM EVC RTNOPTHY: CPT | Performed by: FAMILY MEDICINE

## 2025-04-28 PROCEDURE — 3044F HG A1C LEVEL LT 7.0%: CPT | Performed by: FAMILY MEDICINE

## 2025-04-28 ASSESSMENT — ENCOUNTER SYMPTOMS
EYES NEGATIVE: 1
ALLERGIC/IMMUNOLOGIC NEGATIVE: 1
GASTROINTESTINAL NEGATIVE: 1
RESPIRATORY NEGATIVE: 1

## 2025-04-28 NOTE — PROGRESS NOTES
25     Terrance Bell    : 1965 Sex: male   Age: 59 y.o.      Chief Complaint   Patient presents with    Diabetes    Discuss Labs       Prior to Admission medications    Medication Sig Start Date End Date Taking? Authorizing Provider   ipratropium (ATROVENT) 0.06 % nasal spray 2 sprays by Each Nostril route 4 times daily 25  Yes Rody Garcia DO   gabapentin (NEURONTIN) 100 MG capsule Take 1 capsule by mouth nightly for 120 days. Intended supply: 30 days 3/31/25 7/29/25 Yes Jaziel Zapata DO   lisinopril (PRINIVIL;ZESTRIL) 40 MG tablet 1 qd 3/24/25  Yes Jaziel Zapata DO   Semaglutide, 1 MG/DOSE, (OZEMPIC) 4 MG/3ML SOPN sc injection Inject 1 mg into the skin every 7 days 3/24/25  Yes Jaziel Zapata DO   azelastine HCl (ASTEPRO) 0.15 % SOLN by Nasal route   Yes Provider, MD Richard   ASPIRIN 81 PO Take by mouth daily   Yes Provider, MD Richard   ciclopirox (LOPROX) 0.77 % cream Apply topically 2 times daily Apply topically 2 times daily.   Yes Provider, MD Richard   montelukast (SINGULAIR) 10 MG tablet Take 1 tablet by mouth nightly   Yes Provider, MD Richard          HPI: Patient evaluated today with type 2 diabetes hypertension obesity history of subdural hematoma and has been doing well.  Vertiginous complaints essentially resolved.  History of pulmonary nodule on the right that will be reassessed January of next year.  Has been following with pulmonary.  Testosterone remains stable.  Statin myopathy in the past so refraining from use of statins but cholesterol overall well-controlled.  Diet and exercise.          Review of Systems   Constitutional: Negative.    HENT: Negative.     Eyes: Negative.    Respiratory: Negative.     Gastrointestinal: Negative.    Endocrine: Negative.    Genitourinary: Negative.    Musculoskeletal: Negative.    Skin: Negative.    Allergic/Immunologic: Negative.    Neurological: Negative.    Hematological: Negative.

## 2025-05-08 ENCOUNTER — OFFICE VISIT (OUTPATIENT)
Dept: FAMILY MEDICINE CLINIC | Age: 60
End: 2025-05-08
Payer: COMMERCIAL

## 2025-05-08 VITALS
BODY MASS INDEX: 41.75 KG/M2 | DIASTOLIC BLOOD PRESSURE: 68 MMHG | RESPIRATION RATE: 18 BRPM | WEIGHT: 315 LBS | OXYGEN SATURATION: 97 % | HEART RATE: 94 BPM | HEIGHT: 73 IN | SYSTOLIC BLOOD PRESSURE: 126 MMHG | TEMPERATURE: 98.5 F

## 2025-05-08 DIAGNOSIS — J01.90 ACUTE NON-RECURRENT SINUSITIS, UNSPECIFIED LOCATION: Primary | ICD-10-CM

## 2025-05-08 DIAGNOSIS — E11.9 TYPE 2 DIABETES MELLITUS WITHOUT COMPLICATION, WITHOUT LONG-TERM CURRENT USE OF INSULIN (HCC): ICD-10-CM

## 2025-05-08 LAB
CREATININE URINE: 151 MG/DL (ref 40–278)
MICROALBUMIN/CREAT 24H UR: <12 MG/L (ref 0–20)
MICROALBUMIN/CREAT UR-RTO: <8 MCG/MG CREAT (ref 0–30)

## 2025-05-08 PROCEDURE — 1036F TOBACCO NON-USER: CPT | Performed by: PHYSICIAN ASSISTANT

## 2025-05-08 PROCEDURE — 96372 THER/PROPH/DIAG INJ SC/IM: CPT | Performed by: PHYSICIAN ASSISTANT

## 2025-05-08 PROCEDURE — G8427 DOCREV CUR MEDS BY ELIG CLIN: HCPCS | Performed by: PHYSICIAN ASSISTANT

## 2025-05-08 PROCEDURE — G8417 CALC BMI ABV UP PARAM F/U: HCPCS | Performed by: PHYSICIAN ASSISTANT

## 2025-05-08 PROCEDURE — 99214 OFFICE O/P EST MOD 30 MIN: CPT | Performed by: PHYSICIAN ASSISTANT

## 2025-05-08 PROCEDURE — 3017F COLORECTAL CA SCREEN DOC REV: CPT | Performed by: PHYSICIAN ASSISTANT

## 2025-05-08 RX ORDER — CEFDINIR 300 MG/1
300 CAPSULE ORAL 2 TIMES DAILY
Qty: 20 CAPSULE | Refills: 0 | Status: SHIPPED | OUTPATIENT
Start: 2025-05-08 | End: 2025-05-18

## 2025-05-08 RX ORDER — TRIAMCINOLONE ACETONIDE 40 MG/ML
40 INJECTION, SUSPENSION INTRA-ARTICULAR; INTRAMUSCULAR ONCE
Status: COMPLETED | OUTPATIENT
Start: 2025-05-08 | End: 2025-05-08

## 2025-05-08 RX ORDER — PREDNISONE 10 MG/1
TABLET ORAL
Qty: 18 TABLET | Refills: 0 | Status: SHIPPED | OUTPATIENT
Start: 2025-05-08

## 2025-05-08 RX ADMIN — TRIAMCINOLONE ACETONIDE 40 MG: 40 INJECTION, SUSPENSION INTRA-ARTICULAR; INTRAMUSCULAR at 12:56

## 2025-05-08 NOTE — PROGRESS NOTES
25  Terrance Bell : 1965 Sex: male  Age 59 y.o.      Subjective:  Chief Complaint   Patient presents with    Sinus Problem         HPI:     History of Present Illness  The patient is a 59-year-old male who presents for evaluation of sinus congestion.    He reports that his symptoms began approximately one week ago, with a significant exacerbation occurring at 3 AM this morning. He was awakened due to difficulty breathing, even while wearing his BiPAP mask, which he attributes to severe nasal congestion. He has not experienced any fevers but does report a sensation of fullness and occasional coughing. He mentions that his symptoms have been more severe since his recent surgery and is considering resuming his previous medication regimen. He expresses a desire for treatment to alleviate his symptoms, including the administration of a Kenalog injection, which he has received in the past, and a prescription for steroids to manage his condition at home.            ROS:   Unless otherwise stated in this report the patient's positive and negative responses for review of systems for constitutional, eyes, ENT, cardiovascular, respiratory, gastrointestinal, neurological, , musculoskeletal, and integument systems and related systems to the presenting problem are either stated in the history of present illness or were not pertinent or were negative for the symptoms and/or complaints related to the presenting medical problem.  Positives and pertinent negatives as per HPI.  All others reviewed and are negative.      PMH:     Past Medical History:   Diagnosis Date    Arthritis     Asthma     Deviated nasal septum     Diabetes mellitus (HCC)     Hypertension     Prolonged emergence from general anesthesia        Past Surgical History:   Procedure Laterality Date    APPENDECTOMY      COLONOSCOPY N/A 04/15/2021    COLONOSCOPY WITH BIOPSY performed by Bird Brown DO at [facility] ENDOSCOPY    FOOT SURGERY Right

## 2025-06-23 ENCOUNTER — OFFICE VISIT (OUTPATIENT)
Dept: PRIMARY CARE CLINIC | Age: 60
End: 2025-06-23
Payer: COMMERCIAL

## 2025-06-23 VITALS
WEIGHT: 315 LBS | BODY MASS INDEX: 41.75 KG/M2 | TEMPERATURE: 97.8 F | HEART RATE: 76 BPM | SYSTOLIC BLOOD PRESSURE: 136 MMHG | HEIGHT: 73 IN | OXYGEN SATURATION: 94 % | DIASTOLIC BLOOD PRESSURE: 80 MMHG

## 2025-06-23 DIAGNOSIS — E66.01 MORBID OBESITY (HCC): ICD-10-CM

## 2025-06-23 DIAGNOSIS — T46.6X5A STATIN MYOPATHY: ICD-10-CM

## 2025-06-23 DIAGNOSIS — I15.9 SECONDARY HYPERTENSION: Primary | ICD-10-CM

## 2025-06-23 DIAGNOSIS — E11.9 TYPE 2 DIABETES MELLITUS WITHOUT COMPLICATION, WITHOUT LONG-TERM CURRENT USE OF INSULIN (HCC): ICD-10-CM

## 2025-06-23 DIAGNOSIS — G72.0 STATIN MYOPATHY: ICD-10-CM

## 2025-06-23 PROCEDURE — 3044F HG A1C LEVEL LT 7.0%: CPT | Performed by: FAMILY MEDICINE

## 2025-06-23 PROCEDURE — 3017F COLORECTAL CA SCREEN DOC REV: CPT | Performed by: FAMILY MEDICINE

## 2025-06-23 PROCEDURE — 2022F DILAT RTA XM EVC RTNOPTHY: CPT | Performed by: FAMILY MEDICINE

## 2025-06-23 PROCEDURE — G8427 DOCREV CUR MEDS BY ELIG CLIN: HCPCS | Performed by: FAMILY MEDICINE

## 2025-06-23 PROCEDURE — 1036F TOBACCO NON-USER: CPT | Performed by: FAMILY MEDICINE

## 2025-06-23 PROCEDURE — G8417 CALC BMI ABV UP PARAM F/U: HCPCS | Performed by: FAMILY MEDICINE

## 2025-06-23 PROCEDURE — 99214 OFFICE O/P EST MOD 30 MIN: CPT | Performed by: FAMILY MEDICINE

## 2025-06-23 NOTE — PROGRESS NOTES
25     Terrance Bell    : 1965 Sex: male   Age: 59 y.o.      Chief Complaint   Patient presents with    Diabetes    Medication Refill    Depression     Loss of fater has not been sleeping well.    Insomnia       Prior to Admission medications    Medication Sig Start Date End Date Taking? Authorizing Provider   Semaglutide, 1 MG/DOSE, (OZEMPIC) 4 MG/3ML SOPN sc injection Inject 1 mg into the skin every 7 days 25  Yes Jaziel Zapata DO   ipratropium (ATROVENT) 0.06 % nasal spray 2 sprays by Each Nostril route 4 times daily 25   Rody Garcia DO   gabapentin (NEURONTIN) 100 MG capsule Take 1 capsule by mouth nightly for 120 days. Intended supply: 30 days 3/31/25 7/29/25  Jaziel Zapata DO   lisinopril (PRINIVIL;ZESTRIL) 40 MG tablet 1 qd 3/24/25   Jaziel Zapata DO   azelastine HCl (ASTEPRO) 0.15 % SOLN by Nasal route    Provider, MD Richard   ASPIRIN 81 PO Take by mouth daily    ProviderRichard MD   ciclopirox (LOPROX) 0.77 % cream Apply topically 2 times daily Apply topically 2 times daily.    ProviderRichard MD   montelukast (SINGULAIR) 10 MG tablet Take 1 tablet by mouth nightly    ProviderRichard MD          HPI: Patient evaluated today diabetes morbid obesity hypertension statin myopathy.  Overall medically has been well medications well-tolerated.  Systems review stable.          Review of Systems   Constitutional: Negative.    HENT: Negative.     Eyes: Negative.    Respiratory: Negative.     Gastrointestinal: Negative.    Endocrine: Negative.    Genitourinary: Negative.    Musculoskeletal: Negative.    Skin: Negative.    Allergic/Immunologic: Negative.    Neurological: Negative.    Hematological: Negative.    Psychiatric/Behavioral: Negative.                Current Outpatient Medications:     Semaglutide, 1 MG/DOSE, (OZEMPIC) 4 MG/3ML SOPN sc injection, Inject 1 mg into the skin every 7 days, Disp: 3 mL, Rfl: 2    ipratropium (ATROVENT) 0.06 %

## 2025-07-01 ENCOUNTER — OFFICE VISIT (OUTPATIENT)
Dept: ENT CLINIC | Age: 60
End: 2025-07-01
Payer: COMMERCIAL

## 2025-07-01 VITALS
WEIGHT: 315 LBS | SYSTOLIC BLOOD PRESSURE: 99 MMHG | RESPIRATION RATE: 15 BRPM | HEART RATE: 76 BPM | DIASTOLIC BLOOD PRESSURE: 63 MMHG | BODY MASS INDEX: 41.75 KG/M2 | OXYGEN SATURATION: 98 % | HEIGHT: 73 IN

## 2025-07-01 DIAGNOSIS — Z98.890 S/P FESS (FUNCTIONAL ENDOSCOPIC SINUS SURGERY): Primary | ICD-10-CM

## 2025-07-01 DIAGNOSIS — J32.4 CHRONIC PANSINUSITIS: ICD-10-CM

## 2025-07-01 PROCEDURE — G8417 CALC BMI ABV UP PARAM F/U: HCPCS | Performed by: OTOLARYNGOLOGY

## 2025-07-01 PROCEDURE — 1036F TOBACCO NON-USER: CPT | Performed by: OTOLARYNGOLOGY

## 2025-07-01 PROCEDURE — 3017F COLORECTAL CA SCREEN DOC REV: CPT | Performed by: OTOLARYNGOLOGY

## 2025-07-01 PROCEDURE — G8427 DOCREV CUR MEDS BY ELIG CLIN: HCPCS | Performed by: OTOLARYNGOLOGY

## 2025-07-01 PROCEDURE — 99213 OFFICE O/P EST LOW 20 MIN: CPT | Performed by: OTOLARYNGOLOGY

## 2025-07-01 RX ORDER — FEXOFENADINE HCL 180 MG/1
180 TABLET ORAL DAILY
Qty: 90 TABLET | Refills: 1 | Status: SHIPPED | OUTPATIENT
Start: 2025-07-01

## 2025-07-01 RX ORDER — FLUTICASONE PROPIONATE 50 MCG
2 SPRAY, SUSPENSION (ML) NASAL DAILY
Qty: 16 G | Refills: 3 | Status: SHIPPED | OUTPATIENT
Start: 2025-07-01

## 2025-07-01 RX ORDER — AZELASTINE 1 MG/ML
1 SPRAY, METERED NASAL 2 TIMES DAILY
Qty: 30 ML | Refills: 3 | Status: SHIPPED | OUTPATIENT
Start: 2025-07-01

## 2025-07-01 NOTE — PROGRESS NOTES
Subjective:      Patient ID:  Terrance Bell is a 59 y.o. male.    HPI:    Pt is returning from FESS surgery 1 year(s) ago.  PT is not doing well.  PT is  breathing better.   Pt is  using Astelin    Pt is using flushes but getting multiple infections.  Sometimes it gets rinsed out. Sometimes he gets on an ABx      Past Medical History:   Diagnosis Date    Arthritis     Asthma     Deviated nasal septum     Diabetes mellitus (HCC)     Hypertension     Pneumonia     Prolonged emergence from general anesthesia     Type 2 diabetes mellitus without complication (HCC)      Past Surgical History:   Procedure Laterality Date    APPENDECTOMY      COLONOSCOPY N/A 04/15/2021    COLONOSCOPY WITH BIOPSY performed by Bird Brown DO at [facility] ENDOSCOPY    FOOT SURGERY Right     PILONIDAL CYST EXCISION      SINUS ENDOSCOPY Bilateral 2023    FUNCTIONAL ENDOSCOPIC SINUS SURGERY, SUBMUCOSAL RESECTION INFERIOR TURBINATES, SEPTOPLASTY performed by Bobby Jacobs DO at [facility] OR    TONSILLECTOMY      UPPER GASTROINTESTINAL ENDOSCOPY N/A 04/15/2021    EGD ESOPHAGOGASTRODUODENOSCOPY DILATATION performed by Bird Brown DO at [facility] ENDOSCOPY     Family History   Problem Relation Age of Onset    Cancer Mother     Diabetes Mother      Social History     Socioeconomic History    Marital status: Single     Spouse name: None    Number of children: 4    Years of education: 14    Highest education level: None   Occupational History     Employer: SELF-EMPLOYED   Tobacco Use    Smoking status: Former     Current packs/day: 0.00     Average packs/day: 2.0 packs/day for 17.9 years (35.8 ttl pk-yrs)     Types: Cigarettes     Start date:      Quit date: 1999     Years since quittin.5    Smokeless tobacco: Never   Vaping Use    Vaping status: Never Used   Substance and Sexual Activity    Alcohol use: No    Drug use: Not Currently     Social Drivers of Health     Financial Resource Strain: Patient Declined (2024)    Overall

## 2025-07-02 ENCOUNTER — TELEPHONE (OUTPATIENT)
Dept: ENT CLINIC | Age: 60
End: 2025-07-02

## 2025-07-23 LAB — PSA SERPL-MCNC: 3.14 NG/ML (ref 0–4)

## 2025-08-18 ENCOUNTER — HOSPITAL ENCOUNTER (OUTPATIENT)
Dept: CT IMAGING | Age: 60
Discharge: HOME | End: 2025-08-20
Attending: OTOLARYNGOLOGY
Payer: COMMERCIAL

## 2025-08-18 DIAGNOSIS — J32.4 CHRONIC PANSINUSITIS: ICD-10-CM

## 2025-08-18 PROCEDURE — 70486 CT MAXILLOFACIAL W/O DYE: CPT

## 2025-08-29 ENCOUNTER — OFFICE VISIT (OUTPATIENT)
Dept: FAMILY MEDICINE CLINIC | Age: 60
End: 2025-08-29
Payer: COMMERCIAL

## 2025-08-29 ENCOUNTER — TELEPHONE (OUTPATIENT)
Dept: PRIMARY CARE CLINIC | Age: 60
End: 2025-08-29

## 2025-08-29 VITALS
TEMPERATURE: 97.4 F | OXYGEN SATURATION: 97 % | SYSTOLIC BLOOD PRESSURE: 116 MMHG | RESPIRATION RATE: 16 BRPM | HEART RATE: 79 BPM | DIASTOLIC BLOOD PRESSURE: 64 MMHG | WEIGHT: 315 LBS | HEIGHT: 73 IN | BODY MASS INDEX: 41.75 KG/M2

## 2025-08-29 DIAGNOSIS — R10.13 EPIGASTRIC PAIN: ICD-10-CM

## 2025-08-29 DIAGNOSIS — R10.13 EPIGASTRIC PAIN: Primary | ICD-10-CM

## 2025-08-29 LAB
ALBUMIN: 4.1 G/DL (ref 3.5–5.2)
ALP BLD-CCNC: 80 U/L (ref 40–129)
ALT SERPL-CCNC: 14 U/L (ref 0–50)
ANION GAP SERPL CALCULATED.3IONS-SCNC: 14 MMOL/L (ref 7–16)
AST SERPL-CCNC: 21 U/L (ref 0–50)
BASOPHILS ABSOLUTE: 0.05 K/UL (ref 0–0.2)
BASOPHILS RELATIVE PERCENT: 0 % (ref 0–2)
BILIRUB SERPL-MCNC: 0.5 MG/DL (ref 0–1.2)
BILIRUBIN DIRECT: 0.2 MG/DL (ref 0–0.2)
BILIRUBIN, INDIRECT: 0.3 MG/DL (ref 0–1)
BUN BLDV-MCNC: 11 MG/DL (ref 6–20)
C-REACTIVE PROTEIN: 12.7 MG/L (ref 0–5)
CALCIUM SERPL-MCNC: 9.3 MG/DL (ref 8.6–10)
CHLORIDE BLD-SCNC: 104 MMOL/L (ref 98–107)
CO2: 20 MMOL/L (ref 22–29)
CREAT SERPL-MCNC: 0.8 MG/DL (ref 0.7–1.2)
EOSINOPHILS ABSOLUTE: 0.15 K/UL (ref 0.05–0.5)
EOSINOPHILS RELATIVE PERCENT: 1 % (ref 0–6)
GFR, ESTIMATED: >90 ML/MIN/1.73M2
GLUCOSE BLD-MCNC: 93 MG/DL (ref 74–99)
HCT VFR BLD CALC: 41.2 % (ref 37–54)
HEMOGLOBIN: 13.2 G/DL (ref 12.5–16.5)
IMMATURE GRANULOCYTES %: 1 % (ref 0–5)
IMMATURE GRANULOCYTES ABSOLUTE: 0.07 K/UL (ref 0–0.58)
LIPASE: 27 U/L (ref 13–60)
LYMPHOCYTES ABSOLUTE: 1.85 K/UL (ref 1.5–4)
LYMPHOCYTES RELATIVE PERCENT: 13 % (ref 20–42)
MCH RBC QN AUTO: 28.4 PG (ref 26–35)
MCHC RBC AUTO-ENTMCNC: 32 G/DL (ref 32–34.5)
MCV RBC AUTO: 88.6 FL (ref 80–99.9)
MONOCYTES ABSOLUTE: 0.79 K/UL (ref 0.1–0.95)
MONOCYTES RELATIVE PERCENT: 6 % (ref 2–12)
NEUTROPHILS ABSOLUTE: 11.07 K/UL (ref 1.8–7.3)
NEUTROPHILS RELATIVE PERCENT: 79 % (ref 43–80)
PDW BLD-RTO: 15.2 % (ref 11.5–15)
PLATELET # BLD: 276 K/UL (ref 130–450)
PMV BLD AUTO: 10.4 FL (ref 7–12)
POTASSIUM SERPL-SCNC: 3.9 MMOL/L (ref 3.5–5.1)
RBC # BLD: 4.65 M/UL (ref 3.8–5.8)
SODIUM BLD-SCNC: 138 MMOL/L (ref 136–145)
TOTAL PROTEIN: 7 G/DL (ref 6.4–8.3)
WBC # BLD: 14 K/UL (ref 4.5–11.5)

## 2025-08-29 PROCEDURE — G8417 CALC BMI ABV UP PARAM F/U: HCPCS | Performed by: FAMILY MEDICINE

## 2025-08-29 PROCEDURE — 1036F TOBACCO NON-USER: CPT | Performed by: FAMILY MEDICINE

## 2025-08-29 PROCEDURE — 99214 OFFICE O/P EST MOD 30 MIN: CPT | Performed by: FAMILY MEDICINE

## 2025-08-29 PROCEDURE — 3017F COLORECTAL CA SCREEN DOC REV: CPT | Performed by: FAMILY MEDICINE

## 2025-08-29 PROCEDURE — G8427 DOCREV CUR MEDS BY ELIG CLIN: HCPCS | Performed by: FAMILY MEDICINE

## 2025-08-29 RX ORDER — METRONIDAZOLE 500 MG/1
500 TABLET ORAL 3 TIMES DAILY
Qty: 42 TABLET | Refills: 0 | Status: SHIPPED | OUTPATIENT
Start: 2025-08-29 | End: 2025-09-12

## 2025-08-29 RX ORDER — CLARITHROMYCIN 500 MG/1
500 TABLET ORAL 2 TIMES DAILY
Qty: 28 TABLET | Refills: 0 | Status: SHIPPED | OUTPATIENT
Start: 2025-08-29 | End: 2025-09-12

## 2025-08-29 RX ORDER — PANTOPRAZOLE SODIUM 40 MG/1
40 TABLET, DELAYED RELEASE ORAL
Qty: 28 TABLET | Refills: 0 | Status: SHIPPED | OUTPATIENT
Start: 2025-08-29 | End: 2025-09-12

## 2025-08-29 ASSESSMENT — ENCOUNTER SYMPTOMS
SORE THROAT: 0
VOMITING: 1
ABDOMINAL PAIN: 1
SHORTNESS OF BREATH: 0
CONSTIPATION: 0
COUGH: 0
BLOOD IN STOOL: 0
ABDOMINAL DISTENTION: 1
BACK PAIN: 0
NAUSEA: 1
DIARRHEA: 0
PHOTOPHOBIA: 0

## (undated) DEVICE — FORCEP BX 1.8 MMX160 CM NDL RADIAL JAW DISP

## (undated) DEVICE — SURGICAL PROCEDURE PACK EENT CUST

## (undated) DEVICE — CODMAN® SURGICAL PATTIES 1/2" X 3" (1.27CM X 7.62CM): Brand: CODMAN®

## (undated) DEVICE — SPONGE GZ W4XL4IN RAYON POLY CVR W/NONWOVEN FAB STRL 2/PK

## (undated) DEVICE — SYRINGE, LUER LOCK, 10ML: Brand: MEDLINE

## (undated) DEVICE — SHEATH 1912000 5PK 4MM/0DEG STORZ XOMED: Brand: ENDO-SCRUB®

## (undated) DEVICE — BLOCK BITE 60FR RUBBER ADLT DENTAL

## (undated) DEVICE — MARKER,SKIN,WI/RULER AND LABELS: Brand: MEDLINE

## (undated) DEVICE — BALLOON SINUPLASTY 6X16 MM SYS RELIEVA SPINPLUS

## (undated) DEVICE — SYRINGE,CONTROL,LL,FINGER,GRIP: Brand: MEDLINE INDUSTRIES, INC.

## (undated) DEVICE — BLADE 1884380HR QUADCUT 5PK 4.3MM X 13CM: Brand: QUADCUT®

## (undated) DEVICE — SPLINT 1524055 DOYLE II AIRWAY SET: Brand: DOYLE II ™

## (undated) DEVICE — NEEDLE SPNL 22GA L3.5IN BLK HUB S STL REG WALL FIT STYL W/

## (undated) DEVICE — KIT OPHTHLMC W/7.3CM X 7.3CM INSTRMNT WIPE 0.4CM X 15CM EYE

## (undated) DEVICE — GRADUATE TRIANG MEASURE 1000ML BLK PRNT

## (undated) DEVICE — NEEDLE FLTR 18GA L1.5IN MEM THK5UM BLNT DISP

## (undated) DEVICE — SYRINGE MED 50ML LUERLOCK TIP

## (undated) DEVICE — DEVICE INFL BLLN FOR DEFLATION OF CATH ACCLARENT

## (undated) DEVICE — SOLUTION IV 1000ML 0.9% SOD CHL PH 5 INJ USP VIAFLX PLAS

## (undated) DEVICE — REFLEX ULTRA 45 WITH INTEGRATED CABLE: Brand: COBLATION

## (undated) DEVICE — TUBING, SUCTION, 3/16" X 12', STRAIGHT: Brand: MEDLINE

## (undated) DEVICE — LIGHT SOURCE WHT

## (undated) DEVICE — TOWEL,OR,DSP,ST,BLUE,STD,6/PK,12PK/CS: Brand: MEDLINE

## (undated) DEVICE — FORCEPS BX L240CM JAW DIA2.4MM ORNG L CAP W/ NDL DISP RAD

## (undated) DEVICE — SPONGE GZ W4XL4IN RAYON POLY FILL CVR W/ NONWOVEN FAB

## (undated) DEVICE — 4-PORT MANIFOLD: Brand: NEPTUNE 2

## (undated) DEVICE — SHEATH 1912010 5PK 4MM/30DEG STORZ XOMED: Brand: ENDO-SCRUB®

## (undated) DEVICE — BLADE,STAINLESS-STEEL,15,STRL,DISPOSABLE: Brand: MEDLINE

## (undated) DEVICE — SOLUTION IRRIG 1000ML STRL H2O USP PLAS POUR BTL

## (undated) DEVICE — COUNTER NDL 30 COUNT DBL MAG

## (undated) DEVICE — SOLUTION IV 500ML 0.9% SOD CHL PH 5 INJ USP VIAFLX PLAS

## (undated) DEVICE — MAGNETIC INSTR DRAPE 20X16: Brand: MEDLINE INDUSTRIES, INC.

## (undated) DEVICE — KIT,ANTI FOG,W/SPONGE & FLUID,SOFT PACK: Brand: MEDLINE

## (undated) DEVICE — DEVICE INFL PRSS G INDIC DISP (MUST BE PURC IN MULTIPLES OF 5)

## (undated) DEVICE — DOUBLE BASIN SET: Brand: MEDLINE INDUSTRIES, INC.

## (undated) DEVICE — NEEDLE HYPO 25GA L1.5IN BLU POLYPR HUB S STL REG BVL STR

## (undated) DEVICE — GLOVE ORANGE PI 8 1/2   MSG9085

## (undated) DEVICE — TUBING 1912030 ENDO-SCRUB 2 5PK: Brand: ENDO-SCRUB®